# Patient Record
Sex: MALE | Race: WHITE | NOT HISPANIC OR LATINO | Employment: OTHER | RURAL
[De-identification: names, ages, dates, MRNs, and addresses within clinical notes are randomized per-mention and may not be internally consistent; named-entity substitution may affect disease eponyms.]

---

## 2020-03-23 ENCOUNTER — HISTORICAL (OUTPATIENT)
Dept: ADMINISTRATIVE | Facility: HOSPITAL | Age: 85
End: 2020-03-23

## 2020-03-23 LAB
ALBUMIN SERPL BCP-MCNC: 3.6 G/DL (ref 3.5–5)
ALBUMIN/GLOB SERPL: 0.9 {RATIO}
ALP SERPL-CCNC: 97 U/L (ref 45–115)
ALT SERPL W P-5'-P-CCNC: 15 U/L (ref 16–61)
APTT PPP: 25.1 SECONDS (ref 25.2–37.3)
AST SERPL W P-5'-P-CCNC: 16 U/L (ref 15–37)
BASOPHILS # BLD AUTO: 0.04 X10E3/UL (ref 0–0.2)
BASOPHILS NFR BLD AUTO: 0.4 % (ref 0–1)
BILIRUB SERPL-MCNC: 1 MG/DL (ref 0–1.2)
BUN SERPL-MCNC: 16 MG/DL (ref 7–18)
BUN/CREAT SERPL: 12.3
CALCIUM SERPL-MCNC: 9.5 MG/DL (ref 8.5–10.1)
CHLORIDE SERPL-SCNC: 102 MMOL/L (ref 98–107)
CO2 SERPL-SCNC: 28 MMOL/L (ref 21–32)
CREAT SERPL-MCNC: 1.3 MG/DL (ref 0.7–1.3)
EOSINOPHIL # BLD AUTO: 0.07 X10E3/UL (ref 0–0.5)
EOSINOPHIL NFR BLD AUTO: 0.7 % (ref 1–4)
ERYTHROCYTE [DISTWIDTH] IN BLOOD BY AUTOMATED COUNT: 13.8 % (ref 11.5–14.5)
GLOBULIN SER-MCNC: 4 G/DL (ref 2–4)
GLUCOSE SERPL-MCNC: 150 MG/DL (ref 74–106)
HCT VFR BLD AUTO: 44.2 % (ref 40–54)
HGB BLD-MCNC: 13.4 G/DL (ref 13.5–18)
IMM GRANULOCYTES # BLD AUTO: 0.08 X10E3/UL (ref 0–0.04)
IMM GRANULOCYTES NFR BLD: 0.8 % (ref 0–0.4)
INR BLD: 1.15 (ref 0–3.3)
LYMPHOCYTES # BLD AUTO: 0.73 X10E3/UL (ref 1–4.8)
LYMPHOCYTES NFR BLD AUTO: 7.3 % (ref 27–41)
MCH RBC QN AUTO: 28.5 PG (ref 27–31)
MCHC RBC AUTO-ENTMCNC: 30.3 G/DL (ref 32–36)
MCV RBC AUTO: 93.8 FL (ref 80–96)
MONOCYTES # BLD AUTO: 0.61 X10E3/UL (ref 0–0.8)
MONOCYTES NFR BLD AUTO: 6.1 % (ref 2–6)
MPC BLD CALC-MCNC: 10.5 FL (ref 9.4–12.4)
NEUTROPHILS # BLD AUTO: 8.49 X10E3/UL (ref 1.8–7.7)
NEUTROPHILS NFR BLD AUTO: 84.7 % (ref 53–65)
NRBC # BLD AUTO: 0 X10E3/UL (ref 0–0)
NRBC, AUTO (.00): 0 /100 (ref 0–0)
PLATELET # BLD AUTO: 240 X10E3/UL (ref 150–400)
POTASSIUM SERPL-SCNC: 4.4 MMOL/L (ref 3.5–5.1)
PROT SERPL-MCNC: 7.6 G/DL (ref 6.4–8.2)
PROTHROMBIN TIME: 14.8 SECONDS (ref 11.7–14.7)
RBC # BLD AUTO: 4.71 X10E6/UL (ref 4.6–6.2)
SODIUM SERPL-SCNC: 136 MMOL/L (ref 136–145)
WBC # BLD AUTO: 10.02 X10E3/UL (ref 4.5–11)

## 2020-03-24 ENCOUNTER — HISTORICAL (OUTPATIENT)
Dept: ADMINISTRATIVE | Facility: HOSPITAL | Age: 85
End: 2020-03-24

## 2020-03-24 LAB
ABO: NORMAL
ANTIBODY SCREEN: NORMAL
BILIRUB UR QL STRIP: ABNORMAL MG/DL
CLARITY UR: CLEAR
COLOR UR: YELLOW
GLUCOSE UR STRIP-MCNC: NEGATIVE MG/DL
KETONES UR STRIP-SCNC: NEGATIVE MG/DL
LEUKOCYTE ESTERASE UR QL STRIP: NEGATIVE LEU/UL
NITRITE UR QL STRIP: NEGATIVE
PH UR STRIP: 5.5 PH UNITS (ref 5–8)
PROT UR QL STRIP: NEGATIVE MG/DL
RBC # UR STRIP: NEGATIVE ERY/UL
RH TYPE: NORMAL
SP GR UR STRIP: >=1.03 (ref 1–1.03)
UROBILINOGEN UR STRIP-ACNC: 0.2 EU/DL

## 2020-03-25 ENCOUNTER — HISTORICAL (OUTPATIENT)
Dept: ADMINISTRATIVE | Facility: HOSPITAL | Age: 85
End: 2020-03-25

## 2020-03-25 LAB
BASOPHILS # BLD AUTO: 0.04 X10E3/UL (ref 0–0.2)
BASOPHILS NFR BLD AUTO: 0.4 % (ref 0–1)
BILIRUB UR QL STRIP: NEGATIVE MG/DL
BUN SERPL-MCNC: 28 MG/DL (ref 7–18)
CALCIUM SERPL-MCNC: 9.3 MG/DL (ref 8.5–10.1)
CHLORIDE SERPL-SCNC: 101 MMOL/L (ref 98–107)
CLARITY UR: CLEAR
CO2 SERPL-SCNC: 24 MMOL/L (ref 21–32)
COLOR UR: ABNORMAL
CREAT SERPL-MCNC: 1.7 MG/DL (ref 0.7–1.3)
EOSINOPHIL # BLD AUTO: 0.08 X10E3/UL (ref 0–0.5)
EOSINOPHIL NFR BLD AUTO: 0.8 % (ref 1–4)
ERYTHROCYTE [DISTWIDTH] IN BLOOD BY AUTOMATED COUNT: 13.8 % (ref 11.5–14.5)
FLUAV AG UPPER RESP QL IA.RAPID: NEGATIVE
FLUBV AG UPPER RESP QL IA.RAPID: NEGATIVE
GLUCOSE SERPL-MCNC: 109 MG/DL (ref 74–106)
GLUCOSE UR STRIP-MCNC: NEGATIVE MG/DL
HCT VFR BLD AUTO: 39.8 % (ref 40–54)
HGB BLD-MCNC: 12.3 G/DL (ref 13.5–18)
IMM GRANULOCYTES # BLD AUTO: 0.05 X10E3/UL (ref 0–0.04)
IMM GRANULOCYTES NFR BLD: 0.5 % (ref 0–0.4)
KETONES UR STRIP-SCNC: NEGATIVE MG/DL
LEUKOCYTE ESTERASE UR QL STRIP: NEGATIVE LEU/UL
LYMPHOCYTES # BLD AUTO: 1.35 X10E3/UL (ref 1–4.8)
LYMPHOCYTES NFR BLD AUTO: 12.7 % (ref 27–41)
MAGNESIUM SERPL-MCNC: 2.1 MG/DL (ref 1.7–2.3)
MCH RBC QN AUTO: 28.8 PG (ref 27–31)
MCHC RBC AUTO-ENTMCNC: 30.9 G/DL (ref 32–36)
MCV RBC AUTO: 93.2 FL (ref 80–96)
MONOCYTES # BLD AUTO: 1.03 X10E3/UL (ref 0–0.8)
MONOCYTES NFR BLD AUTO: 9.7 % (ref 2–6)
MPC BLD CALC-MCNC: 10.5 FL (ref 9.4–12.4)
NEUTROPHILS # BLD AUTO: 8.05 X10E3/UL (ref 1.8–7.7)
NEUTROPHILS NFR BLD AUTO: 75.9 % (ref 53–65)
NITRITE UR QL STRIP: NEGATIVE
NRBC # BLD AUTO: 0 X10E3/UL (ref 0–0)
NRBC, AUTO (.00): 0 /100 (ref 0–0)
PH UR STRIP: 5 PH UNITS (ref 5–8)
PLATELET # BLD AUTO: 184 X10E3/UL (ref 150–400)
POTASSIUM SERPL-SCNC: 4.5 MMOL/L (ref 3.5–5.1)
PROT UR QL STRIP: NEGATIVE MG/DL
RAPID GROUP A STREP: NEGATIVE
RAPID RSV: NEGATIVE
RBC # BLD AUTO: 4.27 X10E6/UL (ref 4.6–6.2)
RBC # UR STRIP: ABNORMAL ERY/UL
SODIUM SERPL-SCNC: 134 MMOL/L (ref 136–145)
SP GR UR STRIP: 1.01 (ref 1–1.03)
UROBILINOGEN UR STRIP-ACNC: 0.2 EU/DL
WBC # BLD AUTO: 10.6 X10E3/UL (ref 4.5–11)

## 2020-03-26 ENCOUNTER — HISTORICAL (OUTPATIENT)
Dept: ADMINISTRATIVE | Facility: HOSPITAL | Age: 85
End: 2020-03-26

## 2020-03-26 LAB
BASOPHILS # BLD AUTO: 0.03 X10E3/UL (ref 0–0.2)
BASOPHILS NFR BLD AUTO: 0.2 % (ref 0–1)
BUN SERPL-MCNC: 23 MG/DL (ref 7–18)
CALCIUM SERPL-MCNC: 9 MG/DL (ref 8.5–10.1)
CHLORIDE SERPL-SCNC: 101 MMOL/L (ref 98–107)
CO2 SERPL-SCNC: 29 MMOL/L (ref 21–32)
CREAT SERPL-MCNC: 1.48 MG/DL (ref 0.7–1.3)
EOSINOPHIL # BLD AUTO: 0 X10E3/UL (ref 0–0.5)
EOSINOPHIL NFR BLD AUTO: 0 % (ref 1–4)
ERYTHROCYTE [DISTWIDTH] IN BLOOD BY AUTOMATED COUNT: 14.1 % (ref 11.5–14.5)
GLUCOSE SERPL-MCNC: 136 MG/DL (ref 74–106)
HCT VFR BLD AUTO: 37.7 % (ref 40–54)
HGB BLD-MCNC: 11.8 G/DL (ref 13.5–18)
IMM GRANULOCYTES # BLD AUTO: 0.07 X10E3/UL (ref 0–0.04)
IMM GRANULOCYTES NFR BLD: 0.5 % (ref 0–0.4)
LYMPHOCYTES # BLD AUTO: 0.92 X10E3/UL (ref 1–4.8)
LYMPHOCYTES NFR BLD AUTO: 7 % (ref 27–41)
MCH RBC QN AUTO: 28.7 PG (ref 27–31)
MCHC RBC AUTO-ENTMCNC: 31.3 G/DL (ref 32–36)
MCV RBC AUTO: 91.7 FL (ref 80–96)
MONOCYTES # BLD AUTO: 1.01 X10E3/UL (ref 0–0.8)
MONOCYTES NFR BLD AUTO: 7.7 % (ref 2–6)
MPC BLD CALC-MCNC: 9.8 FL (ref 9.4–12.4)
NEUTROPHILS # BLD AUTO: 11.02 X10E3/UL (ref 1.8–7.7)
NEUTROPHILS NFR BLD AUTO: 84.6 % (ref 53–65)
NRBC # BLD AUTO: 0 X10E3/UL (ref 0–0)
NRBC, AUTO (.00): 0 /100 (ref 0–0)
PLATELET # BLD AUTO: 191 X10E3/UL (ref 150–400)
POTASSIUM SERPL-SCNC: 4.9 MMOL/L (ref 3.5–5.1)
RBC # BLD AUTO: 4.11 X10E6/UL (ref 4.6–6.2)
SODIUM SERPL-SCNC: 135 MMOL/L (ref 136–145)
WBC # BLD AUTO: 13.05 X10E3/UL (ref 4.5–11)

## 2020-03-27 LAB
REPORT: NORMAL

## 2020-03-30 ENCOUNTER — HISTORICAL (OUTPATIENT)
Dept: ADMINISTRATIVE | Facility: HOSPITAL | Age: 85
End: 2020-03-30

## 2020-03-31 LAB
REPORT: NORMAL

## 2020-04-01 LAB
REPORT: NORMAL

## 2020-04-05 ENCOUNTER — HISTORICAL (OUTPATIENT)
Dept: ADMINISTRATIVE | Facility: HOSPITAL | Age: 85
End: 2020-04-05

## 2020-04-09 ENCOUNTER — HISTORICAL (OUTPATIENT)
Dept: ADMINISTRATIVE | Facility: HOSPITAL | Age: 85
End: 2020-04-09

## 2020-04-09 LAB
ALBUMIN SERPL BCP-MCNC: 2.9 G/DL (ref 3.5–5)
ALBUMIN/GLOB SERPL: 0.6 {RATIO}
ALP SERPL-CCNC: 127 U/L (ref 45–115)
ALT SERPL W P-5'-P-CCNC: 21 U/L (ref 16–61)
AST SERPL W P-5'-P-CCNC: 20 U/L (ref 15–37)
BASOPHILS # BLD AUTO: 0.05 X10E3/UL (ref 0–0.2)
BASOPHILS NFR BLD AUTO: 0.6 % (ref 0–1)
BILIRUB SERPL-MCNC: 0.6 MG/DL (ref 0–1.2)
BUN SERPL-MCNC: 25 MG/DL (ref 7–18)
BUN/CREAT SERPL: 20.5
CALCIUM SERPL-MCNC: 9.5 MG/DL (ref 8.5–10.1)
CHLORIDE SERPL-SCNC: 101 MMOL/L (ref 98–107)
CO2 SERPL-SCNC: 33 MMOL/L (ref 21–32)
CREAT SERPL-MCNC: 1.22 MG/DL (ref 0.7–1.3)
EOSINOPHIL # BLD AUTO: 0.37 X10E3/UL (ref 0–0.5)
EOSINOPHIL NFR BLD AUTO: 4.3 % (ref 1–4)
ERYTHROCYTE [DISTWIDTH] IN BLOOD BY AUTOMATED COUNT: 14.6 % (ref 11.5–14.5)
GLOBULIN SER-MCNC: 4.6 G/DL (ref 2–4)
GLUCOSE SERPL-MCNC: 111 MG/DL (ref 74–106)
HCT VFR BLD AUTO: 39.7 % (ref 40–54)
HGB BLD-MCNC: 12.1 G/DL (ref 13.5–18)
IMM GRANULOCYTES # BLD AUTO: 0.06 X10E3/UL (ref 0–0.04)
IMM GRANULOCYTES NFR BLD: 0.7 % (ref 0–0.4)
LYMPHOCYTES # BLD AUTO: 1.52 X10E3/UL (ref 1–4.8)
LYMPHOCYTES NFR BLD AUTO: 17.7 % (ref 27–41)
MCH RBC QN AUTO: 29 PG (ref 27–31)
MCHC RBC AUTO-ENTMCNC: 30.5 G/DL (ref 32–36)
MCV RBC AUTO: 95.2 FL (ref 80–96)
MONOCYTES # BLD AUTO: 0.75 X10E3/UL (ref 0–0.8)
MONOCYTES NFR BLD AUTO: 8.7 % (ref 2–6)
MPC BLD CALC-MCNC: 9.5 FL (ref 9.4–12.4)
NEUTROPHILS # BLD AUTO: 5.86 X10E3/UL (ref 1.8–7.7)
NEUTROPHILS NFR BLD AUTO: 68 % (ref 53–65)
NRBC # BLD AUTO: 0 X10E3/UL (ref 0–0)
NRBC, AUTO (.00): 0 /100 (ref 0–0)
PLATELET # BLD AUTO: 421 X10E3/UL (ref 150–400)
POTASSIUM SERPL-SCNC: 4.7 MMOL/L (ref 3.5–5.1)
PROT SERPL-MCNC: 7.5 G/DL (ref 6.4–8.2)
RBC # BLD AUTO: 4.17 X10E6/UL (ref 4.6–6.2)
SODIUM SERPL-SCNC: 138 MMOL/L (ref 136–145)
WBC # BLD AUTO: 8.61 X10E3/UL (ref 4.5–11)

## 2020-04-12 ENCOUNTER — HISTORICAL (OUTPATIENT)
Dept: ADMINISTRATIVE | Facility: HOSPITAL | Age: 85
End: 2020-04-12

## 2020-04-12 LAB
BASOPHILS # BLD AUTO: 0.08 X10E3/UL (ref 0–0.2)
BASOPHILS NFR BLD AUTO: 1 % (ref 0–1)
BUN SERPL-MCNC: 40 MG/DL (ref 7–18)
CALCIUM SERPL-MCNC: 9.3 MG/DL (ref 8.5–10.1)
CHLORIDE SERPL-SCNC: 102 MMOL/L (ref 98–107)
CO2 SERPL-SCNC: 26 MMOL/L (ref 21–32)
CREAT SERPL-MCNC: 1.47 MG/DL (ref 0.7–1.3)
EOSINOPHIL # BLD AUTO: 0.32 X10E3/UL (ref 0–0.5)
EOSINOPHIL NFR BLD AUTO: 3.9 % (ref 1–4)
ERYTHROCYTE [DISTWIDTH] IN BLOOD BY AUTOMATED COUNT: 15.1 % (ref 11.5–14.5)
GLUCOSE SERPL-MCNC: 112 MG/DL (ref 74–106)
HCT VFR BLD AUTO: 40.9 % (ref 40–54)
HGB BLD-MCNC: 12.8 G/DL (ref 13.5–18)
IMM GRANULOCYTES # BLD AUTO: 0.05 X10E3/UL (ref 0–0.04)
IMM GRANULOCYTES NFR BLD: 0.6 % (ref 0–0.4)
LYMPHOCYTES # BLD AUTO: 1.57 X10E3/UL (ref 1–4.8)
LYMPHOCYTES NFR BLD AUTO: 19 % (ref 27–41)
MCH RBC QN AUTO: 29.4 PG (ref 27–31)
MCHC RBC AUTO-ENTMCNC: 31.3 G/DL (ref 32–36)
MCV RBC AUTO: 93.8 FL (ref 80–96)
MONOCYTES # BLD AUTO: 0.61 X10E3/UL (ref 0–0.8)
MONOCYTES NFR BLD AUTO: 7.4 % (ref 2–6)
MPC BLD CALC-MCNC: 9.4 FL (ref 9.4–12.4)
NEUTROPHILS # BLD AUTO: 5.65 X10E3/UL (ref 1.8–7.7)
NEUTROPHILS NFR BLD AUTO: 68.1 % (ref 53–65)
NRBC # BLD AUTO: 0 X10E3/UL (ref 0–0)
NRBC, AUTO (.00): 0 /100 (ref 0–0)
PLATELET # BLD AUTO: 409 X10E3/UL (ref 150–400)
POTASSIUM SERPL-SCNC: 4.3 MMOL/L (ref 3.5–5.1)
RBC # BLD AUTO: 4.36 X10E6/UL (ref 4.6–6.2)
SODIUM SERPL-SCNC: 138 MMOL/L (ref 136–145)
WBC # BLD AUTO: 8.28 X10E3/UL (ref 4.5–11)

## 2021-04-06 RX ORDER — HYDROCODONE BITARTRATE AND ACETAMINOPHEN 10; 325 MG/1; MG/1
1 TABLET ORAL 3 TIMES DAILY PRN
COMMUNITY
End: 2021-04-06 | Stop reason: SDUPTHER

## 2021-04-06 RX ORDER — TRAZODONE HYDROCHLORIDE 50 MG/1
50 TABLET ORAL NIGHTLY
COMMUNITY
End: 2021-04-06 | Stop reason: SDUPTHER

## 2021-04-06 RX ORDER — TRAMADOL HYDROCHLORIDE 50 MG/1
50 TABLET ORAL
COMMUNITY
End: 2021-04-06 | Stop reason: SDUPTHER

## 2021-04-06 RX ORDER — TRAMADOL HYDROCHLORIDE 50 MG/1
50 TABLET ORAL
Qty: 15 TABLET | Refills: 0 | Status: SHIPPED | OUTPATIENT
Start: 2021-04-06 | End: 2021-05-06 | Stop reason: SDUPTHER

## 2021-04-06 RX ORDER — TRAZODONE HYDROCHLORIDE 50 MG/1
50 TABLET ORAL NIGHTLY
Qty: 30 TABLET | Refills: 0 | Status: SHIPPED | OUTPATIENT
Start: 2021-04-06 | End: 2021-05-06 | Stop reason: SDUPTHER

## 2021-04-06 RX ORDER — HYDROCODONE BITARTRATE AND ACETAMINOPHEN 10; 325 MG/1; MG/1
1 TABLET ORAL 3 TIMES DAILY PRN
Qty: 75 TABLET | Refills: 0 | Status: SHIPPED | OUTPATIENT
Start: 2021-04-06 | End: 2021-05-06 | Stop reason: SDUPTHER

## 2021-04-20 ENCOUNTER — TELEPHONE (OUTPATIENT)
Dept: FAMILY MEDICINE | Facility: CLINIC | Age: 86
End: 2021-04-20

## 2021-05-06 ENCOUNTER — OFFICE VISIT (OUTPATIENT)
Dept: FAMILY MEDICINE | Facility: CLINIC | Age: 86
End: 2021-05-06
Payer: MEDICARE

## 2021-05-06 VITALS
OXYGEN SATURATION: 94 % | WEIGHT: 66 LBS | BODY MASS INDEX: 8.94 KG/M2 | RESPIRATION RATE: 16 BRPM | SYSTOLIC BLOOD PRESSURE: 146 MMHG | TEMPERATURE: 98 F | DIASTOLIC BLOOD PRESSURE: 91 MMHG | HEART RATE: 79 BPM | HEIGHT: 72 IN

## 2021-05-06 DIAGNOSIS — Z79.891 ENCOUNTER FOR LONG-TERM OPIATE ANALGESIC USE: ICD-10-CM

## 2021-05-06 DIAGNOSIS — D64.9 ANEMIA, UNSPECIFIED TYPE: ICD-10-CM

## 2021-05-06 DIAGNOSIS — G89.4 CHRONIC PAIN SYNDROME: Primary | ICD-10-CM

## 2021-05-06 LAB
ALBUMIN SERPL BCP-MCNC: 4 G/DL (ref 3.5–5)
ALBUMIN/GLOB SERPL: 1.1 {RATIO}
ALP SERPL-CCNC: 94 U/L (ref 45–115)
ALT SERPL W P-5'-P-CCNC: 17 U/L (ref 16–61)
ANION GAP SERPL CALCULATED.3IONS-SCNC: 10 MMOL/L (ref 7–16)
AST SERPL W P-5'-P-CCNC: 7 U/L (ref 15–37)
BASOPHILS # BLD AUTO: 0.05 K/UL (ref 0–0.2)
BASOPHILS NFR BLD AUTO: 0.8 % (ref 0–1)
BILIRUB SERPL-MCNC: 0.8 MG/DL (ref 0–1.2)
BILIRUB UR QL STRIP: NEGATIVE
BUN SERPL-MCNC: 14 MG/DL (ref 7–18)
BUN/CREAT SERPL: 15 (ref 6–20)
CALCIUM SERPL-MCNC: 9.2 MG/DL (ref 8.5–10.1)
CHLORIDE SERPL-SCNC: 106 MMOL/L (ref 98–107)
CLARITY UR: CLEAR
CO2 SERPL-SCNC: 29 MMOL/L (ref 21–32)
COLOR UR: YELLOW
CREAT SERPL-MCNC: 0.91 MG/DL (ref 0.7–1.3)
CTP QC/QA: YES
DIFFERENTIAL METHOD BLD: ABNORMAL
EOSINOPHIL # BLD AUTO: 0.24 K/UL (ref 0–0.5)
EOSINOPHIL NFR BLD AUTO: 3.9 % (ref 1–4)
ERYTHROCYTE [DISTWIDTH] IN BLOOD BY AUTOMATED COUNT: 16.3 % (ref 11.5–14.5)
GLOBULIN SER-MCNC: 3.8 G/DL (ref 2–4)
GLUCOSE SERPL-MCNC: 100 MG/DL (ref 74–106)
GLUCOSE UR STRIP-MCNC: NEGATIVE MG/DL
HCT VFR BLD AUTO: 44.7 % (ref 40–54)
HGB BLD-MCNC: 13.4 G/DL (ref 13.5–18)
IMM GRANULOCYTES # BLD AUTO: 0.01 K/UL (ref 0–0.04)
IMM GRANULOCYTES NFR BLD: 0.2 % (ref 0–0.4)
KETONES UR STRIP-SCNC: NEGATIVE MG/DL
LEUKOCYTE ESTERASE UR QL STRIP: NEGATIVE
LYMPHOCYTES # BLD AUTO: 1.68 K/UL (ref 1–4.8)
LYMPHOCYTES NFR BLD AUTO: 27 % (ref 27–41)
MCH RBC QN AUTO: 28 PG (ref 27–31)
MCHC RBC AUTO-ENTMCNC: 30 G/DL (ref 32–36)
MCV RBC AUTO: 93.5 FL (ref 80–96)
MONOCYTES # BLD AUTO: 0.58 K/UL (ref 0–0.8)
MONOCYTES NFR BLD AUTO: 9.3 % (ref 2–6)
MPC BLD CALC-MCNC: 11 FL (ref 9.4–12.4)
NEUTROPHILS # BLD AUTO: 3.67 K/UL (ref 1.8–7.7)
NEUTROPHILS NFR BLD AUTO: 58.8 % (ref 53–65)
NITRITE UR QL STRIP: NEGATIVE
NRBC # BLD AUTO: 0 X10E3/UL
NRBC, AUTO (.00): 0 %
PH UR STRIP: 5.5 PH UNITS
PLATELET # BLD AUTO: 219 K/UL (ref 150–400)
POC (AMP) AMPHETAMINE: NEGATIVE
POC (BAR) BARBITURATES: NEGATIVE
POC (BUP) BUPRENORPHINE: NEGATIVE
POC (BZO) BENZODIAZEPINES: NEGATIVE
POC (COC) COCAINE: NEGATIVE
POC (MDMA) METHYLENEDIOXYMETHAMPHETAMINE 3,4: NEGATIVE
POC (MET) METHAMPHETAMINE: NEGATIVE
POC (MOP) OPIATES: ABNORMAL
POC (MTD) METHADONE: NEGATIVE
POC (OXY) OXYCODONE: NEGATIVE
POC (PCP) PHENCYCLIDINE: NEGATIVE
POC (TCA) TRICYCLIC ANTIDEPRESSANTS: NEGATIVE
POC TEMPERATURE (URINE): 92
POC THC: NEGATIVE
POTASSIUM SERPL-SCNC: 4.7 MMOL/L (ref 3.5–5.1)
PROT SERPL-MCNC: 7.8 G/DL (ref 6.4–8.2)
PROT UR QL STRIP: NEGATIVE
RBC # BLD AUTO: 4.78 M/UL (ref 4.6–6.2)
RBC # UR STRIP: ABNORMAL /UL
SODIUM SERPL-SCNC: 140 MMOL/L (ref 136–145)
SP GR UR STRIP: 1.02
UROBILINOGEN UR STRIP-ACNC: 0.2 MG/DL
WBC # BLD AUTO: 6.23 K/UL (ref 4.5–11)

## 2021-05-06 PROCEDURE — 1159F MED LIST DOCD IN RCRD: CPT | Mod: ,,, | Performed by: FAMILY MEDICINE

## 2021-05-06 PROCEDURE — 85025 COMPLETE CBC W/AUTO DIFF WBC: CPT | Mod: ,,, | Performed by: CLINICAL MEDICAL LABORATORY

## 2021-05-06 PROCEDURE — 80305 POCT URINE DRUG SCREEN PRESUMP: ICD-10-PCS | Mod: QW,,, | Performed by: FAMILY MEDICINE

## 2021-05-06 PROCEDURE — 80053 COMPREHEN METABOLIC PANEL: CPT | Mod: ,,, | Performed by: CLINICAL MEDICAL LABORATORY

## 2021-05-06 PROCEDURE — 81003 URINALYSIS: ICD-10-PCS | Mod: QW,59,, | Performed by: CLINICAL MEDICAL LABORATORY

## 2021-05-06 PROCEDURE — 85025 CBC WITH DIFFERENTIAL: ICD-10-PCS | Mod: ,,, | Performed by: CLINICAL MEDICAL LABORATORY

## 2021-05-06 PROCEDURE — 80305 DRUG TEST PRSMV DIR OPT OBS: CPT | Mod: QW,,, | Performed by: FAMILY MEDICINE

## 2021-05-06 PROCEDURE — 99214 OFFICE O/P EST MOD 30 MIN: CPT | Mod: ,,, | Performed by: FAMILY MEDICINE

## 2021-05-06 PROCEDURE — 99214 PR OFFICE/OUTPT VISIT, EST, LEVL IV, 30-39 MIN: ICD-10-PCS | Mod: ,,, | Performed by: FAMILY MEDICINE

## 2021-05-06 PROCEDURE — G0480 DRUG TEST DEF 1-7 CLASSES: HCPCS | Mod: ,,, | Performed by: CLINICAL MEDICAL LABORATORY

## 2021-05-06 PROCEDURE — 80053 COMPREHENSIVE METABOLIC PANEL: ICD-10-PCS | Mod: ,,, | Performed by: CLINICAL MEDICAL LABORATORY

## 2021-05-06 PROCEDURE — 1159F PR MEDICATION LIST DOCUMENTED IN MEDICAL RECORD: ICD-10-PCS | Mod: ,,, | Performed by: FAMILY MEDICINE

## 2021-05-06 PROCEDURE — 1126F AMNT PAIN NOTED NONE PRSNT: CPT | Mod: ,,, | Performed by: FAMILY MEDICINE

## 2021-05-06 PROCEDURE — G0480 DRUG SCREEN DEFINITIVE 7, URINE: ICD-10-PCS | Mod: ,,, | Performed by: CLINICAL MEDICAL LABORATORY

## 2021-05-06 PROCEDURE — 81003 URINALYSIS AUTO W/O SCOPE: CPT | Mod: QW,59,, | Performed by: CLINICAL MEDICAL LABORATORY

## 2021-05-06 PROCEDURE — 1126F PR PAIN SEVERITY QUANTIFIED, NO PAIN PRESENT: ICD-10-PCS | Mod: ,,, | Performed by: FAMILY MEDICINE

## 2021-05-06 RX ORDER — FUROSEMIDE 40 MG/1
40 TABLET ORAL 2 TIMES DAILY
COMMUNITY
End: 2021-06-22

## 2021-05-06 RX ORDER — TRAZODONE HYDROCHLORIDE 50 MG/1
50 TABLET ORAL NIGHTLY
Qty: 30 TABLET | Refills: 0 | Status: SHIPPED | OUTPATIENT
Start: 2021-05-06 | End: 2021-07-07 | Stop reason: SDUPTHER

## 2021-05-06 RX ORDER — CARVEDILOL 6.25 MG/1
6.25 TABLET ORAL DAILY
COMMUNITY
End: 2022-03-15 | Stop reason: SDUPTHER

## 2021-05-06 RX ORDER — CHOLECALCIFEROL (VITAMIN D3) 1250 MCG
1 TABLET ORAL
Qty: 12 TABLET | Refills: 0 | Status: SHIPPED | OUTPATIENT
Start: 2021-05-06 | End: 2021-08-04

## 2021-05-06 RX ORDER — TRAMADOL HYDROCHLORIDE 50 MG/1
50 TABLET ORAL
Qty: 15 TABLET | Refills: 0 | Status: SHIPPED | OUTPATIENT
Start: 2021-05-06 | End: 2021-06-08 | Stop reason: SDUPTHER

## 2021-05-06 RX ORDER — HYDROCODONE BITARTRATE AND ACETAMINOPHEN 10; 325 MG/1; MG/1
1 TABLET ORAL 3 TIMES DAILY PRN
Qty: 75 TABLET | Refills: 0 | Status: SHIPPED | OUTPATIENT
Start: 2021-05-06 | End: 2021-06-08 | Stop reason: SDUPTHER

## 2021-05-06 RX ORDER — ACETAMINOPHEN 325 MG/1
325 TABLET ORAL EVERY 6 HOURS PRN
COMMUNITY
End: 2023-10-06 | Stop reason: CLARIF

## 2021-05-06 RX ORDER — FERROUS SULFATE 325(65) MG
325 TABLET, DELAYED RELEASE (ENTERIC COATED) ORAL DAILY
Qty: 30 TABLET | Refills: 1 | Status: SHIPPED | OUTPATIENT
Start: 2021-05-06 | End: 2021-08-03 | Stop reason: SDUPTHER

## 2021-05-06 RX ORDER — ATORVASTATIN CALCIUM 10 MG/1
10 TABLET, FILM COATED ORAL DAILY
COMMUNITY
End: 2021-07-07

## 2021-05-06 RX ORDER — ASPIRIN 81 MG/1
81 TABLET ORAL DAILY
COMMUNITY
End: 2021-08-03 | Stop reason: SDUPTHER

## 2021-05-06 RX ORDER — LOSARTAN POTASSIUM 50 MG/1
50 TABLET ORAL DAILY
COMMUNITY
End: 2021-06-22 | Stop reason: SDUPTHER

## 2021-05-06 RX ORDER — HYDROCHLOROTHIAZIDE 12.5 MG/1
12.5 TABLET ORAL DAILY
COMMUNITY
End: 2021-06-22

## 2021-05-14 LAB
6-ACETYLMORPHINE, URINE (RUSH): NEGATIVE 10 NG/ML
7-AMINOCLONAZEPAM, URINE (RUSH): NEGATIVE 25 NG/ML
A-HYDROXYALPRAZOLAM, URINE (RUSH): NEGATIVE 25 NG/ML
AMPHET UR QL SCN: NEGATIVE 100 NG/ML
BENZOYLECGONINE, URINE (RUSH): NEGATIVE 100 NG/ML
BUTALBITAL, URINE (RUSH): NEGATIVE 50 NG/ML
CODEINE, URINE (RUSH): NEGATIVE 25 NG/ML
CREAT UR-MCNC: 100 MG/DL (ref 39–259)
HYDROCODONE, URINE (RUSH): >250 25 NG/ML
HYDROMORPHONE, URINE (RUSH): >250 25 NG/ML
LORAZEPAM, URINE (RUSH): NEGATIVE 25 NG/ML
METHAMPHET UR QL SCN: NEGATIVE 100 NG/ML
MORPHINE, URINE (RUSH): NEGATIVE 25 NG/ML
NORDIAZEPAM, URINE (RUSH): NEGATIVE 25 NG/ML
NORHYDROCODONE, URINE (RUSH): >500 50 NG/ML
NOROXYCODONE HCL, URINE (RUSH): NEGATIVE 50 NG/ML
OXAZEPAM, URINE (RUSH): NEGATIVE 25 NG/ML
OXYCODONE UR QL SCN: NEGATIVE 25 NG/ML
OXYMORPHONE, URINE (RUSH): NEGATIVE 25 NG/ML
PH UR STRIP: 5.5 PH UNITS
PHENOBARBITAL, URINE (RUSH): NEGATIVE 50 NG/ML
SECOBARBITAL, URINE (RUSH): NEGATIVE 50 NG/ML
SP GR UR STRIP: 1.02
TEMAZEPAM, URINE (RUSH): NEGATIVE 25 NG/ML

## 2021-06-06 ENCOUNTER — PATIENT MESSAGE (OUTPATIENT)
Dept: FAMILY MEDICINE | Facility: CLINIC | Age: 86
End: 2021-06-06

## 2021-06-08 DIAGNOSIS — Z79.891 LONG-TERM CURRENT USE OF OPIATE ANALGESIC: Primary | ICD-10-CM

## 2021-06-08 RX ORDER — HYDROCODONE BITARTRATE AND ACETAMINOPHEN 10; 325 MG/1; MG/1
1 TABLET ORAL 3 TIMES DAILY PRN
Qty: 75 TABLET | Refills: 0 | Status: SHIPPED | OUTPATIENT
Start: 2021-06-08 | End: 2021-07-07 | Stop reason: SDUPTHER

## 2021-06-08 RX ORDER — TRAMADOL HYDROCHLORIDE 50 MG/1
50 TABLET ORAL
Qty: 15 TABLET | Refills: 0 | Status: SHIPPED | OUTPATIENT
Start: 2021-06-08 | End: 2021-09-07

## 2021-06-16 ENCOUNTER — OFFICE VISIT (OUTPATIENT)
Dept: FAMILY MEDICINE | Facility: CLINIC | Age: 86
End: 2021-06-16
Payer: MEDICARE

## 2021-06-16 VITALS
RESPIRATION RATE: 16 BRPM | TEMPERATURE: 98 F | SYSTOLIC BLOOD PRESSURE: 181 MMHG | HEIGHT: 72 IN | WEIGHT: 167 LBS | BODY MASS INDEX: 22.62 KG/M2 | DIASTOLIC BLOOD PRESSURE: 96 MMHG | OXYGEN SATURATION: 95 % | HEART RATE: 77 BPM

## 2021-06-16 DIAGNOSIS — I10 HYPERTENSION, UNSPECIFIED TYPE: Primary | ICD-10-CM

## 2021-06-16 DIAGNOSIS — D64.9 ANEMIA, UNSPECIFIED TYPE: ICD-10-CM

## 2021-06-16 DIAGNOSIS — E78.5 HYPERLIPIDEMIA, UNSPECIFIED HYPERLIPIDEMIA TYPE: ICD-10-CM

## 2021-06-16 LAB
25(OH)D3 SERPL-MCNC: 29.5 NG/ML
ALBUMIN SERPL BCP-MCNC: 3.9 G/DL (ref 3.5–5)
ALBUMIN/GLOB SERPL: 1.1 {RATIO}
ALP SERPL-CCNC: 98 U/L (ref 45–115)
ALT SERPL W P-5'-P-CCNC: 25 U/L (ref 16–61)
ANION GAP SERPL CALCULATED.3IONS-SCNC: 8 MMOL/L (ref 7–16)
AST SERPL W P-5'-P-CCNC: 9 U/L (ref 15–37)
BASOPHILS # BLD AUTO: 0.05 K/UL (ref 0–0.2)
BASOPHILS NFR BLD AUTO: 0.8 % (ref 0–1)
BILIRUB SERPL-MCNC: 0.5 MG/DL (ref 0–1.2)
BILIRUB UR QL STRIP: NEGATIVE
BUN SERPL-MCNC: 18 MG/DL (ref 7–18)
BUN/CREAT SERPL: 17 (ref 6–20)
CALCIUM SERPL-MCNC: 8.9 MG/DL (ref 8.5–10.1)
CHLORIDE SERPL-SCNC: 105 MMOL/L (ref 98–107)
CHOLEST SERPL-MCNC: 156 MG/DL (ref 0–200)
CHOLEST/HDLC SERPL: 2.7 {RATIO}
CLARITY UR: CLEAR
CO2 SERPL-SCNC: 32 MMOL/L (ref 21–32)
COLOR UR: YELLOW
CREAT SERPL-MCNC: 1.06 MG/DL (ref 0.7–1.3)
DIFFERENTIAL METHOD BLD: ABNORMAL
EOSINOPHIL # BLD AUTO: 0.27 K/UL (ref 0–0.5)
EOSINOPHIL NFR BLD AUTO: 4.4 % (ref 1–4)
ERYTHROCYTE [DISTWIDTH] IN BLOOD BY AUTOMATED COUNT: 16.1 % (ref 11.5–14.5)
FOLATE SERPL-MCNC: 8.2 NG/ML (ref 3.1–17.5)
GLOBULIN SER-MCNC: 3.5 G/DL (ref 2–4)
GLUCOSE SERPL-MCNC: 92 MG/DL (ref 74–106)
GLUCOSE UR STRIP-MCNC: NEGATIVE MG/DL
HCT VFR BLD AUTO: 45.7 % (ref 40–54)
HDLC SERPL-MCNC: 58 MG/DL (ref 40–60)
HGB BLD-MCNC: 13.5 G/DL (ref 13.5–18)
IMM GRANULOCYTES # BLD AUTO: 0.01 K/UL (ref 0–0.04)
IMM GRANULOCYTES NFR BLD: 0.2 % (ref 0–0.4)
KETONES UR STRIP-SCNC: NEGATIVE MG/DL
LDLC SERPL CALC-MCNC: 78 MG/DL
LDLC/HDLC SERPL: 1.3 {RATIO}
LEUKOCYTE ESTERASE UR QL STRIP: NEGATIVE
LYMPHOCYTES # BLD AUTO: 1.6 K/UL (ref 1–4.8)
LYMPHOCYTES NFR BLD AUTO: 26 % (ref 27–41)
MCH RBC QN AUTO: 28.5 PG (ref 27–31)
MCHC RBC AUTO-ENTMCNC: 29.5 G/DL (ref 32–36)
MCV RBC AUTO: 96.4 FL (ref 80–96)
MONOCYTES # BLD AUTO: 0.48 K/UL (ref 0–0.8)
MONOCYTES NFR BLD AUTO: 7.8 % (ref 2–6)
MPC BLD CALC-MCNC: 10.5 FL (ref 9.4–12.4)
NEUTROPHILS # BLD AUTO: 3.74 K/UL (ref 1.8–7.7)
NEUTROPHILS NFR BLD AUTO: 60.8 % (ref 53–65)
NITRITE UR QL STRIP: NEGATIVE
NONHDLC SERPL-MCNC: 98 MG/DL
NRBC # BLD AUTO: 0 X10E3/UL
NRBC, AUTO (.00): 0 %
PH UR STRIP: 6 PH UNITS
PLATELET # BLD AUTO: 229 K/UL (ref 150–400)
POTASSIUM SERPL-SCNC: 4.2 MMOL/L (ref 3.5–5.1)
PROT SERPL-MCNC: 7.4 G/DL (ref 6.4–8.2)
PROT UR QL STRIP: NEGATIVE
RBC # BLD AUTO: 4.74 M/UL (ref 4.6–6.2)
RBC # UR STRIP: NEGATIVE /UL
SODIUM SERPL-SCNC: 141 MMOL/L (ref 136–145)
SP GR UR STRIP: 1.02
T4 FREE SERPL-MCNC: 1.01 NG/DL (ref 0.76–1.46)
TRIGL SERPL-MCNC: 98 MG/DL (ref 35–150)
TSH SERPL DL<=0.005 MIU/L-ACNC: 1.48 UIU/ML (ref 0.36–3.74)
UROBILINOGEN UR STRIP-ACNC: 0.2 MG/DL
VIT B12 SERPL-MCNC: 255 PG/ML (ref 193–986)
VLDLC SERPL-MCNC: 20 MG/DL
WBC # BLD AUTO: 6.15 K/UL (ref 4.5–11)

## 2021-06-16 PROCEDURE — 80053 COMPREHENSIVE METABOLIC PANEL: ICD-10-PCS | Mod: QW,,, | Performed by: CLINICAL MEDICAL LABORATORY

## 2021-06-16 PROCEDURE — 82306 VITAMIN D 25 HYDROXY: CPT | Mod: GZ,,, | Performed by: CLINICAL MEDICAL LABORATORY

## 2021-06-16 PROCEDURE — 81003 URINALYSIS AUTO W/O SCOPE: CPT | Mod: QW,,, | Performed by: CLINICAL MEDICAL LABORATORY

## 2021-06-16 PROCEDURE — 1159F MED LIST DOCD IN RCRD: CPT | Mod: ,,, | Performed by: FAMILY MEDICINE

## 2021-06-16 PROCEDURE — 84439 ASSAY OF FREE THYROXINE: CPT | Mod: ,,, | Performed by: CLINICAL MEDICAL LABORATORY

## 2021-06-16 PROCEDURE — 82607 VITAMIN B-12: CPT | Mod: GZ,,, | Performed by: CLINICAL MEDICAL LABORATORY

## 2021-06-16 PROCEDURE — 99214 OFFICE O/P EST MOD 30 MIN: CPT | Mod: ,,, | Performed by: FAMILY MEDICINE

## 2021-06-16 PROCEDURE — 81003 URINALYSIS: ICD-10-PCS | Mod: QW,,, | Performed by: CLINICAL MEDICAL LABORATORY

## 2021-06-16 PROCEDURE — 84439 T4, FREE: ICD-10-PCS | Mod: ,,, | Performed by: CLINICAL MEDICAL LABORATORY

## 2021-06-16 PROCEDURE — 84443 TSH: ICD-10-PCS | Mod: QW,,, | Performed by: CLINICAL MEDICAL LABORATORY

## 2021-06-16 PROCEDURE — 85025 COMPLETE CBC W/AUTO DIFF WBC: CPT | Mod: QW,,, | Performed by: CLINICAL MEDICAL LABORATORY

## 2021-06-16 PROCEDURE — 1126F PR PAIN SEVERITY QUANTIFIED, NO PAIN PRESENT: ICD-10-PCS | Mod: ,,, | Performed by: FAMILY MEDICINE

## 2021-06-16 PROCEDURE — 85025 CBC WITH DIFFERENTIAL: ICD-10-PCS | Mod: QW,,, | Performed by: CLINICAL MEDICAL LABORATORY

## 2021-06-16 PROCEDURE — 80061 LIPID PANEL: ICD-10-PCS | Mod: QW,,, | Performed by: CLINICAL MEDICAL LABORATORY

## 2021-06-16 PROCEDURE — 82746 ASSAY OF FOLIC ACID SERUM: CPT | Mod: GZ,,, | Performed by: CLINICAL MEDICAL LABORATORY

## 2021-06-16 PROCEDURE — 84443 ASSAY THYROID STIM HORMONE: CPT | Mod: QW,,, | Performed by: CLINICAL MEDICAL LABORATORY

## 2021-06-16 PROCEDURE — 82306 VITAMIN D: ICD-10-PCS | Mod: GZ,,, | Performed by: CLINICAL MEDICAL LABORATORY

## 2021-06-16 PROCEDURE — 82607 VITAMIN B12/FOLATE, SERUM PANEL: ICD-10-PCS | Mod: GZ,,, | Performed by: CLINICAL MEDICAL LABORATORY

## 2021-06-16 PROCEDURE — 36415 PR COLLECTION VENOUS BLOOD,VENIPUNCTURE: ICD-10-PCS | Mod: ,,, | Performed by: CLINICAL MEDICAL LABORATORY

## 2021-06-16 PROCEDURE — 99214 PR OFFICE/OUTPT VISIT, EST, LEVL IV, 30-39 MIN: ICD-10-PCS | Mod: ,,, | Performed by: FAMILY MEDICINE

## 2021-06-16 PROCEDURE — 36415 COLL VENOUS BLD VENIPUNCTURE: CPT | Mod: ,,, | Performed by: CLINICAL MEDICAL LABORATORY

## 2021-06-16 PROCEDURE — 1159F PR MEDICATION LIST DOCUMENTED IN MEDICAL RECORD: ICD-10-PCS | Mod: ,,, | Performed by: FAMILY MEDICINE

## 2021-06-16 PROCEDURE — 80053 COMPREHEN METABOLIC PANEL: CPT | Mod: QW,,, | Performed by: CLINICAL MEDICAL LABORATORY

## 2021-06-16 PROCEDURE — 82746 VITAMIN B12/FOLATE, SERUM PANEL: ICD-10-PCS | Mod: GZ,,, | Performed by: CLINICAL MEDICAL LABORATORY

## 2021-06-16 PROCEDURE — 80061 LIPID PANEL: CPT | Mod: QW,,, | Performed by: CLINICAL MEDICAL LABORATORY

## 2021-06-16 PROCEDURE — 1126F AMNT PAIN NOTED NONE PRSNT: CPT | Mod: ,,, | Performed by: FAMILY MEDICINE

## 2021-06-16 RX ORDER — LOSARTAN POTASSIUM 25 MG/1
25 TABLET ORAL DAILY
COMMUNITY
End: 2021-06-22 | Stop reason: CLARIF

## 2021-06-22 RX ORDER — LOSARTAN POTASSIUM 50 MG/1
50 TABLET ORAL DAILY
Qty: 30 TABLET | Refills: 0 | Status: ON HOLD | OUTPATIENT
Start: 2021-06-22 | End: 2022-02-16 | Stop reason: SDUPTHER

## 2021-07-07 ENCOUNTER — HOSPITAL ENCOUNTER (OUTPATIENT)
Dept: RADIOLOGY | Facility: HOSPITAL | Age: 86
Discharge: HOME | End: 2021-07-07
Attending: FAMILY MEDICINE
Payer: MEDICARE

## 2021-07-07 ENCOUNTER — OFFICE VISIT (OUTPATIENT)
Dept: FAMILY MEDICINE | Facility: CLINIC | Age: 86
End: 2021-07-07
Payer: MEDICARE

## 2021-07-07 DIAGNOSIS — T40.2X5A CONSTIPATION DUE TO OPIOID THERAPY: ICD-10-CM

## 2021-07-07 DIAGNOSIS — R06.00 DYSPNEA, UNSPECIFIED TYPE: ICD-10-CM

## 2021-07-07 DIAGNOSIS — K59.03 CONSTIPATION DUE TO OPIOID THERAPY: ICD-10-CM

## 2021-07-07 DIAGNOSIS — I10 HYPERTENSION, UNSPECIFIED TYPE: ICD-10-CM

## 2021-07-07 DIAGNOSIS — Z79.891 LONG-TERM CURRENT USE OF OPIATE ANALGESIC: ICD-10-CM

## 2021-07-07 DIAGNOSIS — E78.5 HYPERLIPIDEMIA, UNSPECIFIED HYPERLIPIDEMIA TYPE: ICD-10-CM

## 2021-07-07 DIAGNOSIS — Z76.89 ENCOUNTER TO ESTABLISH CARE WITH NEW DOCTOR: Primary | ICD-10-CM

## 2021-07-07 PROCEDURE — 99214 PR OFFICE/OUTPT VISIT, EST, LEVL IV, 30-39 MIN: ICD-10-PCS | Mod: GC,,, | Performed by: FAMILY MEDICINE

## 2021-07-07 PROCEDURE — 99214 OFFICE O/P EST MOD 30 MIN: CPT | Mod: GC,,, | Performed by: FAMILY MEDICINE

## 2021-07-07 PROCEDURE — 71046 X-RAY EXAM CHEST 2 VIEWS: CPT | Mod: 26,,, | Performed by: RADIOLOGY

## 2021-07-07 PROCEDURE — 71046 X-RAY EXAM CHEST 2 VIEWS: CPT | Mod: TC

## 2021-07-07 PROCEDURE — 71046 XR CHEST PA AND LATERAL: ICD-10-PCS | Mod: 26,,, | Performed by: RADIOLOGY

## 2021-07-07 RX ORDER — SIMVASTATIN 80 MG/1
80 TABLET, FILM COATED ORAL NIGHTLY
COMMUNITY
End: 2022-02-21 | Stop reason: SDUPTHER

## 2021-07-07 RX ORDER — HYDROCODONE BITARTRATE AND ACETAMINOPHEN 10; 325 MG/1; MG/1
1 TABLET ORAL 3 TIMES DAILY PRN
Qty: 90 TABLET | Refills: 0 | Status: SHIPPED | OUTPATIENT
Start: 2021-07-07 | End: 2021-08-06

## 2021-07-07 RX ORDER — NIFEDIPINE 30 MG/1
30 TABLET, FILM COATED, EXTENDED RELEASE ORAL DAILY
COMMUNITY
End: 2022-02-28 | Stop reason: SDUPTHER

## 2021-07-07 RX ORDER — TRAZODONE HYDROCHLORIDE 50 MG/1
50 TABLET ORAL NIGHTLY
Qty: 30 TABLET | Refills: 0 | OUTPATIENT
Start: 2021-07-07 | End: 2022-02-21 | Stop reason: SDUPTHER

## 2021-07-08 VITALS
BODY MASS INDEX: 22.82 KG/M2 | DIASTOLIC BLOOD PRESSURE: 86 MMHG | SYSTOLIC BLOOD PRESSURE: 144 MMHG | OXYGEN SATURATION: 92 % | HEIGHT: 70 IN | WEIGHT: 159.38 LBS | HEART RATE: 78 BPM | TEMPERATURE: 98 F | RESPIRATION RATE: 18 BRPM

## 2021-08-03 ENCOUNTER — OFFICE VISIT (OUTPATIENT)
Dept: FAMILY MEDICINE | Facility: CLINIC | Age: 86
End: 2021-08-03
Payer: MEDICARE

## 2021-08-03 VITALS
TEMPERATURE: 98 F | BODY MASS INDEX: 22.84 KG/M2 | WEIGHT: 159.19 LBS | SYSTOLIC BLOOD PRESSURE: 165 MMHG | HEART RATE: 83 BPM | OXYGEN SATURATION: 90 % | DIASTOLIC BLOOD PRESSURE: 92 MMHG

## 2021-08-03 DIAGNOSIS — M54.50 CHRONIC LOW BACK PAIN, UNSPECIFIED BACK PAIN LATERALITY, UNSPECIFIED WHETHER SCIATICA PRESENT: ICD-10-CM

## 2021-08-03 DIAGNOSIS — K59.03 DRUG-INDUCED CONSTIPATION: ICD-10-CM

## 2021-08-03 DIAGNOSIS — K59.03 CONSTIPATION DUE TO OPIOID THERAPY: ICD-10-CM

## 2021-08-03 DIAGNOSIS — E61.1 IRON DEFICIENCY: ICD-10-CM

## 2021-08-03 DIAGNOSIS — E11.9 TYPE 2 DIABETES MELLITUS WITHOUT COMPLICATION, WITHOUT LONG-TERM CURRENT USE OF INSULIN: Primary | ICD-10-CM

## 2021-08-03 DIAGNOSIS — G89.29 CHRONIC LOW BACK PAIN, UNSPECIFIED BACK PAIN LATERALITY, UNSPECIFIED WHETHER SCIATICA PRESENT: ICD-10-CM

## 2021-08-03 DIAGNOSIS — I10 HYPERTENSION, UNSPECIFIED TYPE: ICD-10-CM

## 2021-08-03 DIAGNOSIS — T40.2X5A CONSTIPATION DUE TO OPIOID THERAPY: ICD-10-CM

## 2021-08-03 PROBLEM — M54.9 CHRONIC BACK PAIN: Status: ACTIVE | Noted: 2021-08-03

## 2021-08-03 PROBLEM — K59.00 CONSTIPATION: Status: ACTIVE | Noted: 2021-08-03

## 2021-08-03 LAB — GLUCOSE SERPL-MCNC: 109 MG/DL (ref 70–110)

## 2021-08-03 PROCEDURE — 99213 OFFICE O/P EST LOW 20 MIN: CPT | Mod: GC,,, | Performed by: FAMILY MEDICINE

## 2021-08-03 PROCEDURE — 82962 GLUCOSE BLOOD TEST: CPT | Mod: QW,GC,, | Performed by: FAMILY MEDICINE

## 2021-08-03 PROCEDURE — 82962 POCT GLUCOSE, HAND-HELD DEVICE: ICD-10-PCS | Mod: QW,GC,, | Performed by: FAMILY MEDICINE

## 2021-08-03 PROCEDURE — 99213 PR OFFICE/OUTPT VISIT, EST, LEVL III, 20-29 MIN: ICD-10-PCS | Mod: GC,,, | Performed by: FAMILY MEDICINE

## 2021-08-03 RX ORDER — ASPIRIN 81 MG/1
81 TABLET ORAL DAILY
Qty: 30 TABLET | Refills: 5 | OUTPATIENT
Start: 2021-08-03

## 2021-08-03 RX ORDER — ASPIRIN 81 MG/1
81 TABLET ORAL DAILY
Qty: 30 TABLET | Refills: 5 | OUTPATIENT
Start: 2021-08-03 | End: 2021-08-03

## 2021-08-03 RX ORDER — FERROUS SULFATE 325(65) MG
325 TABLET, DELAYED RELEASE (ENTERIC COATED) ORAL DAILY
Qty: 30 TABLET | Refills: 5 | OUTPATIENT
Start: 2021-08-03 | End: 2021-08-03 | Stop reason: CLARIF

## 2021-08-03 RX ORDER — FERROUS SULFATE 325(65) MG
325 TABLET, DELAYED RELEASE (ENTERIC COATED) ORAL DAILY
Qty: 30 TABLET | Refills: 5 | OUTPATIENT
Start: 2021-08-03 | End: 2021-08-03

## 2021-08-30 ENCOUNTER — HOSPITAL ENCOUNTER (OUTPATIENT)
Dept: RADIOLOGY | Facility: HOSPITAL | Age: 86
Discharge: HOME | End: 2021-08-30
Attending: PAIN MEDICINE
Payer: MEDICARE

## 2021-08-30 ENCOUNTER — OFFICE VISIT (OUTPATIENT)
Dept: PAIN MEDICINE | Facility: CLINIC | Age: 86
End: 2021-08-30
Payer: MEDICARE

## 2021-08-30 VITALS
SYSTOLIC BLOOD PRESSURE: 186 MMHG | HEART RATE: 88 BPM | RESPIRATION RATE: 22 BRPM | BODY MASS INDEX: 21.98 KG/M2 | WEIGHT: 157 LBS | DIASTOLIC BLOOD PRESSURE: 103 MMHG | HEIGHT: 71 IN

## 2021-08-30 DIAGNOSIS — Z79.899 ENCOUNTER FOR LONG-TERM (CURRENT) USE OF OTHER MEDICATIONS: Primary | ICD-10-CM

## 2021-08-30 DIAGNOSIS — M54.50 CHRONIC BILATERAL LOW BACK PAIN WITHOUT SCIATICA: ICD-10-CM

## 2021-08-30 DIAGNOSIS — M47.816 SPONDYLOSIS OF LUMBAR REGION WITHOUT MYELOPATHY OR RADICULOPATHY: ICD-10-CM

## 2021-08-30 DIAGNOSIS — G89.29 CHRONIC BILATERAL LOW BACK PAIN WITHOUT SCIATICA: ICD-10-CM

## 2021-08-30 LAB
CTP QC/QA: YES
POC (AMP) AMPHETAMINE: NEGATIVE
POC (BAR) BARBITURATES: NEGATIVE
POC (BUP) BUPRENORPHINE: NEGATIVE
POC (BZO) BENZODIAZEPINES: NEGATIVE
POC (COC) COCAINE: NEGATIVE
POC (MDMA) METHYLENEDIOXYMETHAMPHETAMINE 3,4: NEGATIVE
POC (MET) METHAMPHETAMINE: NEGATIVE
POC (MOP) OPIATES: ABNORMAL
POC (MTD) METHADONE: NEGATIVE
POC (OXY) OXYCODONE: NEGATIVE
POC (PCP) PHENCYCLIDINE: NEGATIVE
POC (TCA) TRICYCLIC ANTIDEPRESSANTS: NEGATIVE
POC TEMPERATURE (URINE): 94
POC THC: NEGATIVE

## 2021-08-30 PROCEDURE — 1125F AMNT PAIN NOTED PAIN PRSNT: CPT | Mod: CPTII,,, | Performed by: PAIN MEDICINE

## 2021-08-30 PROCEDURE — G0481 DRUG SCREEN DEFINITIVE 14, URINE: ICD-10-PCS | Mod: ,,, | Performed by: CLINICAL MEDICAL LABORATORY

## 2021-08-30 PROCEDURE — 72110 X-RAY EXAM L-2 SPINE 4/>VWS: CPT | Mod: TC

## 2021-08-30 PROCEDURE — 1159F MED LIST DOCD IN RCRD: CPT | Mod: CPTII,,, | Performed by: PAIN MEDICINE

## 2021-08-30 PROCEDURE — 1125F PR PAIN SEVERITY QUANTIFIED, PAIN PRESENT: ICD-10-PCS | Mod: CPTII,,, | Performed by: PAIN MEDICINE

## 2021-08-30 PROCEDURE — 99214 OFFICE O/P EST MOD 30 MIN: CPT | Mod: PBBFAC | Performed by: PAIN MEDICINE

## 2021-08-30 PROCEDURE — 3288F FALL RISK ASSESSMENT DOCD: CPT | Mod: CPTII,,, | Performed by: PAIN MEDICINE

## 2021-08-30 PROCEDURE — G0481 DRUG TEST DEF 8-14 CLASSES: HCPCS | Mod: ,,, | Performed by: CLINICAL MEDICAL LABORATORY

## 2021-08-30 PROCEDURE — 99203 OFFICE O/P NEW LOW 30 MIN: CPT | Mod: S$PBB,,, | Performed by: PAIN MEDICINE

## 2021-08-30 PROCEDURE — 72110 XR LUMBAR SPINE 5 VIEW WITH FLEX AND EXT: ICD-10-PCS | Mod: 26,,, | Performed by: RADIOLOGY

## 2021-08-30 PROCEDURE — 3288F PR FALLS RISK ASSESSMENT DOCUMENTED: ICD-10-PCS | Mod: CPTII,,, | Performed by: PAIN MEDICINE

## 2021-08-30 PROCEDURE — 1101F PT FALLS ASSESS-DOCD LE1/YR: CPT | Mod: CPTII,,, | Performed by: PAIN MEDICINE

## 2021-08-30 PROCEDURE — 72110 X-RAY EXAM L-2 SPINE 4/>VWS: CPT | Mod: 26,,, | Performed by: RADIOLOGY

## 2021-08-30 PROCEDURE — 99203 PR OFFICE/OUTPT VISIT, NEW, LEVL III, 30-44 MIN: ICD-10-PCS | Mod: S$PBB,,, | Performed by: PAIN MEDICINE

## 2021-08-30 PROCEDURE — 1101F PR PT FALLS ASSESS DOC 0-1 FALLS W/OUT INJ PAST YR: ICD-10-PCS | Mod: CPTII,,, | Performed by: PAIN MEDICINE

## 2021-08-30 PROCEDURE — 1159F PR MEDICATION LIST DOCUMENTED IN MEDICAL RECORD: ICD-10-PCS | Mod: CPTII,,, | Performed by: PAIN MEDICINE

## 2021-08-30 PROCEDURE — 80305 DRUG TEST PRSMV DIR OPT OBS: CPT | Mod: PBBFAC | Performed by: PAIN MEDICINE

## 2021-08-30 RX ORDER — HYDROCODONE BITARTRATE AND ACETAMINOPHEN 10; 325 MG/1; MG/1
1 TABLET ORAL EVERY 8 HOURS PRN
COMMUNITY
End: 2021-09-07 | Stop reason: SDUPTHER

## 2021-09-07 ENCOUNTER — OFFICE VISIT (OUTPATIENT)
Dept: PAIN MEDICINE | Facility: CLINIC | Age: 86
End: 2021-09-07
Payer: MEDICARE

## 2021-09-07 VITALS
SYSTOLIC BLOOD PRESSURE: 172 MMHG | DIASTOLIC BLOOD PRESSURE: 94 MMHG | WEIGHT: 156 LBS | HEART RATE: 93 BPM | HEIGHT: 71 IN | BODY MASS INDEX: 21.84 KG/M2

## 2021-09-07 DIAGNOSIS — M85.89 OSTEOPENIA OF MULTIPLE SITES: ICD-10-CM

## 2021-09-07 DIAGNOSIS — G89.29 CHRONIC PAIN OF LEFT ANKLE: Chronic | ICD-10-CM

## 2021-09-07 DIAGNOSIS — M47.817 LUMBOSACRAL SPONDYLOSIS WITHOUT MYELOPATHY: Primary | Chronic | ICD-10-CM

## 2021-09-07 DIAGNOSIS — M89.49 OSTEOARTHROSIS MULTIPLE SITES, NOT SPECIFIED AS GENERALIZED: Chronic | ICD-10-CM

## 2021-09-07 DIAGNOSIS — M25.572 CHRONIC PAIN OF LEFT ANKLE: Chronic | ICD-10-CM

## 2021-09-07 PROCEDURE — 99214 OFFICE O/P EST MOD 30 MIN: CPT | Mod: S$PBB,,, | Performed by: PHYSICIAN ASSISTANT

## 2021-09-07 PROCEDURE — 99214 OFFICE O/P EST MOD 30 MIN: CPT | Mod: PBBFAC | Performed by: PHYSICIAN ASSISTANT

## 2021-09-07 PROCEDURE — 1159F PR MEDICATION LIST DOCUMENTED IN MEDICAL RECORD: ICD-10-PCS | Mod: CPTII,,, | Performed by: PHYSICIAN ASSISTANT

## 2021-09-07 PROCEDURE — 3288F FALL RISK ASSESSMENT DOCD: CPT | Mod: CPTII,,, | Performed by: PHYSICIAN ASSISTANT

## 2021-09-07 PROCEDURE — 99214 PR OFFICE/OUTPT VISIT, EST, LEVL IV, 30-39 MIN: ICD-10-PCS | Mod: S$PBB,,, | Performed by: PHYSICIAN ASSISTANT

## 2021-09-07 PROCEDURE — 1125F AMNT PAIN NOTED PAIN PRSNT: CPT | Mod: CPTII,,, | Performed by: PHYSICIAN ASSISTANT

## 2021-09-07 PROCEDURE — 1159F MED LIST DOCD IN RCRD: CPT | Mod: CPTII,,, | Performed by: PHYSICIAN ASSISTANT

## 2021-09-07 PROCEDURE — 1125F PR PAIN SEVERITY QUANTIFIED, PAIN PRESENT: ICD-10-PCS | Mod: CPTII,,, | Performed by: PHYSICIAN ASSISTANT

## 2021-09-07 PROCEDURE — 1101F PT FALLS ASSESS-DOCD LE1/YR: CPT | Mod: CPTII,,, | Performed by: PHYSICIAN ASSISTANT

## 2021-09-07 PROCEDURE — 1101F PR PT FALLS ASSESS DOC 0-1 FALLS W/OUT INJ PAST YR: ICD-10-PCS | Mod: CPTII,,, | Performed by: PHYSICIAN ASSISTANT

## 2021-09-07 PROCEDURE — 3288F PR FALLS RISK ASSESSMENT DOCUMENTED: ICD-10-PCS | Mod: CPTII,,, | Performed by: PHYSICIAN ASSISTANT

## 2021-09-07 RX ORDER — HYDROCODONE BITARTRATE AND ACETAMINOPHEN 10; 325 MG/1; MG/1
1 TABLET ORAL EVERY 8 HOURS
Qty: 90 TABLET | Refills: 0 | Status: SHIPPED | OUTPATIENT
Start: 2021-09-07 | End: 2021-10-07

## 2021-09-07 RX ORDER — HYDROCODONE BITARTRATE AND ACETAMINOPHEN 10; 325 MG/1; MG/1
1 TABLET ORAL EVERY 8 HOURS
Qty: 90 TABLET | Refills: 0 | Status: SHIPPED | OUTPATIENT
Start: 2021-10-07 | End: 2021-11-03 | Stop reason: SDUPTHER

## 2021-09-08 ENCOUNTER — OFFICE VISIT (OUTPATIENT)
Dept: FAMILY MEDICINE | Facility: CLINIC | Age: 86
End: 2021-09-08
Payer: MEDICARE

## 2021-09-08 VITALS
HEIGHT: 71 IN | HEART RATE: 85 BPM | TEMPERATURE: 98 F | RESPIRATION RATE: 22 BRPM | BODY MASS INDEX: 21.84 KG/M2 | SYSTOLIC BLOOD PRESSURE: 159 MMHG | WEIGHT: 156 LBS | DIASTOLIC BLOOD PRESSURE: 87 MMHG | OXYGEN SATURATION: 93 %

## 2021-09-08 DIAGNOSIS — E55.9 VITAMIN D DEFICIENCY: Primary | ICD-10-CM

## 2021-09-08 DIAGNOSIS — M85.89 OSTEOPENIA OF MULTIPLE SITES: ICD-10-CM

## 2021-09-08 DIAGNOSIS — I10 HYPERTENSION, UNSPECIFIED TYPE: ICD-10-CM

## 2021-09-08 DIAGNOSIS — M47.817 LUMBOSACRAL SPONDYLOSIS WITHOUT MYELOPATHY: Chronic | ICD-10-CM

## 2021-09-08 LAB
CREAT UR-MCNC: NORMAL MG/DL
PH UR STRIP: NORMAL [PH]
SP GR UR STRIP: NORMAL

## 2021-09-08 PROCEDURE — 99213 PR OFFICE/OUTPT VISIT, EST, LEVL III, 20-29 MIN: ICD-10-PCS | Mod: GC,,, | Performed by: FAMILY MEDICINE

## 2021-09-08 PROCEDURE — 99213 OFFICE O/P EST LOW 20 MIN: CPT | Mod: GC,,, | Performed by: FAMILY MEDICINE

## 2021-09-08 RX ORDER — ERGOCALCIFEROL 1.25 MG/1
50000 CAPSULE ORAL
Qty: 4 CAPSULE | Refills: 1 | Status: SHIPPED | OUTPATIENT
Start: 2021-09-08 | End: 2021-10-28

## 2021-09-10 PROBLEM — E55.9 VITAMIN D DEFICIENCY: Status: ACTIVE | Noted: 2021-09-10

## 2021-09-20 ENCOUNTER — HOSPITAL ENCOUNTER (OUTPATIENT)
Dept: RADIOLOGY | Facility: HOSPITAL | Age: 86
Discharge: HOME | End: 2021-09-20
Attending: FAMILY MEDICINE
Payer: MEDICARE

## 2021-09-20 DIAGNOSIS — M85.89 OSTEOPENIA OF MULTIPLE SITES: ICD-10-CM

## 2021-09-20 PROCEDURE — 77080 DXA BONE DENSITY AXIAL: CPT | Mod: 26,,, | Performed by: RADIOLOGY

## 2021-09-20 PROCEDURE — 77080 DEXA BONE DENSITY SPINE HIP: ICD-10-PCS | Mod: 26,,, | Performed by: RADIOLOGY

## 2021-09-20 PROCEDURE — 77080 DXA BONE DENSITY AXIAL: CPT | Mod: TC

## 2021-11-02 ENCOUNTER — OFFICE VISIT (OUTPATIENT)
Dept: FAMILY MEDICINE | Facility: CLINIC | Age: 86
End: 2021-11-02
Payer: MEDICARE

## 2021-11-02 VITALS
RESPIRATION RATE: 20 BRPM | WEIGHT: 161 LBS | DIASTOLIC BLOOD PRESSURE: 91 MMHG | HEIGHT: 71 IN | OXYGEN SATURATION: 93 % | HEART RATE: 83 BPM | BODY MASS INDEX: 22.54 KG/M2 | TEMPERATURE: 98 F | SYSTOLIC BLOOD PRESSURE: 155 MMHG

## 2021-11-02 DIAGNOSIS — I10 HYPERTENSION, UNSPECIFIED TYPE: ICD-10-CM

## 2021-11-02 DIAGNOSIS — H61.23 IMPACTED CERUMEN, BILATERAL: ICD-10-CM

## 2021-11-02 DIAGNOSIS — M47.817 LUMBOSACRAL SPONDYLOSIS WITHOUT MYELOPATHY: Chronic | ICD-10-CM

## 2021-11-02 DIAGNOSIS — M85.89 OSTEOPENIA OF MULTIPLE SITES: ICD-10-CM

## 2021-11-02 DIAGNOSIS — E55.9 VITAMIN D INSUFFICIENCY: Primary | ICD-10-CM

## 2021-11-02 DIAGNOSIS — H61.23 IMPACTED CERUMEN OF BOTH EARS: ICD-10-CM

## 2021-11-02 DIAGNOSIS — E55.9 VITAMIN D DEFICIENCY: ICD-10-CM

## 2021-11-02 DIAGNOSIS — Z23 INFLUENZA VACCINE NEEDED: ICD-10-CM

## 2021-11-02 PROBLEM — M89.49 OSTEOARTHROSIS MULTIPLE SITES, NOT SPECIFIED AS GENERALIZED: Chronic | Status: RESOLVED | Noted: 2021-09-07 | Resolved: 2021-11-02

## 2021-11-02 LAB — 25(OH)D3 SERPL-MCNC: 52.4 NG/ML

## 2021-11-02 PROCEDURE — 90662 IIV NO PRSV INCREASED AG IM: CPT | Mod: ,,, | Performed by: FAMILY MEDICINE

## 2021-11-02 PROCEDURE — 99212 PR OFFICE/OUTPT VISIT, EST, LEVL II, 10-19 MIN: ICD-10-PCS | Mod: 25,GC,, | Performed by: FAMILY MEDICINE

## 2021-11-02 PROCEDURE — 82306 VITAMIN D 25 HYDROXY: CPT | Mod: ,,, | Performed by: CLINICAL MEDICAL LABORATORY

## 2021-11-02 PROCEDURE — G0008 ADMIN INFLUENZA VIRUS VAC: HCPCS | Mod: ,,, | Performed by: FAMILY MEDICINE

## 2021-11-02 PROCEDURE — 82306 VITAMIN D: ICD-10-PCS | Mod: ,,, | Performed by: CLINICAL MEDICAL LABORATORY

## 2021-11-02 PROCEDURE — 99212 OFFICE O/P EST SF 10 MIN: CPT | Mod: 25,GC,, | Performed by: FAMILY MEDICINE

## 2021-11-02 PROCEDURE — G0008 FLU VACCINE - QUADRIVALENT - HIGH DOSE (65+) PRESERVATIVE FREE IM: ICD-10-PCS | Mod: ,,, | Performed by: FAMILY MEDICINE

## 2021-11-02 PROCEDURE — 90662 FLU VACCINE - QUADRIVALENT - HIGH DOSE (65+) PRESERVATIVE FREE IM: ICD-10-PCS | Mod: ,,, | Performed by: FAMILY MEDICINE

## 2021-11-04 ENCOUNTER — OFFICE VISIT (OUTPATIENT)
Dept: PAIN MEDICINE | Facility: CLINIC | Age: 86
End: 2021-11-04
Payer: MEDICARE

## 2021-11-04 VITALS
RESPIRATION RATE: 18 BRPM | BODY MASS INDEX: 23.52 KG/M2 | HEIGHT: 71 IN | DIASTOLIC BLOOD PRESSURE: 79 MMHG | SYSTOLIC BLOOD PRESSURE: 100 MMHG | HEART RATE: 76 BPM | WEIGHT: 168 LBS

## 2021-11-04 DIAGNOSIS — M89.49 OSTEOARTHROSIS MULTIPLE SITES, NOT SPECIFIED AS GENERALIZED: Chronic | ICD-10-CM

## 2021-11-04 DIAGNOSIS — M47.817 LUMBOSACRAL SPONDYLOSIS WITHOUT MYELOPATHY: Primary | Chronic | ICD-10-CM

## 2021-11-04 DIAGNOSIS — Z79.899 ENCOUNTER FOR LONG-TERM (CURRENT) USE OF OTHER MEDICATIONS: ICD-10-CM

## 2021-11-04 DIAGNOSIS — G89.29 CHRONIC PAIN OF LEFT ANKLE: Chronic | ICD-10-CM

## 2021-11-04 DIAGNOSIS — M25.572 CHRONIC PAIN OF LEFT ANKLE: Chronic | ICD-10-CM

## 2021-11-04 LAB
CTP QC/QA: YES
POC (AMP) AMPHETAMINE: NEGATIVE
POC (BAR) BARBITURATES: NEGATIVE
POC (BUP) BUPRENORPHINE: NEGATIVE
POC (BZO) BENZODIAZEPINES: NEGATIVE
POC (COC) COCAINE: NEGATIVE
POC (MDMA) METHYLENEDIOXYMETHAMPHETAMINE 3,4: NEGATIVE
POC (MET) METHAMPHETAMINE: NEGATIVE
POC (MOP) OPIATES: ABNORMAL
POC (MTD) METHADONE: NEGATIVE
POC (OXY) OXYCODONE: NEGATIVE
POC (PCP) PHENCYCLIDINE: NEGATIVE
POC (TCA) TRICYCLIC ANTIDEPRESSANTS: NEGATIVE
POC TEMPERATURE (URINE): 90
POC THC: NEGATIVE

## 2021-11-04 PROCEDURE — 80305 DRUG TEST PRSMV DIR OPT OBS: CPT | Mod: PBBFAC | Performed by: PHYSICIAN ASSISTANT

## 2021-11-04 PROCEDURE — 1159F PR MEDICATION LIST DOCUMENTED IN MEDICAL RECORD: ICD-10-PCS | Mod: CPTII,,, | Performed by: PHYSICIAN ASSISTANT

## 2021-11-04 PROCEDURE — 3288F FALL RISK ASSESSMENT DOCD: CPT | Mod: CPTII,,, | Performed by: PHYSICIAN ASSISTANT

## 2021-11-04 PROCEDURE — 1101F PT FALLS ASSESS-DOCD LE1/YR: CPT | Mod: CPTII,,, | Performed by: PHYSICIAN ASSISTANT

## 2021-11-04 PROCEDURE — 1159F MED LIST DOCD IN RCRD: CPT | Mod: CPTII,,, | Performed by: PHYSICIAN ASSISTANT

## 2021-11-04 PROCEDURE — 99214 OFFICE O/P EST MOD 30 MIN: CPT | Mod: PBBFAC | Performed by: PHYSICIAN ASSISTANT

## 2021-11-04 PROCEDURE — 1101F PR PT FALLS ASSESS DOC 0-1 FALLS W/OUT INJ PAST YR: ICD-10-PCS | Mod: CPTII,,, | Performed by: PHYSICIAN ASSISTANT

## 2021-11-04 PROCEDURE — 3288F PR FALLS RISK ASSESSMENT DOCUMENTED: ICD-10-PCS | Mod: CPTII,,, | Performed by: PHYSICIAN ASSISTANT

## 2021-11-04 PROCEDURE — 99214 OFFICE O/P EST MOD 30 MIN: CPT | Mod: S$PBB,,, | Performed by: PHYSICIAN ASSISTANT

## 2021-11-04 PROCEDURE — 1125F PR PAIN SEVERITY QUANTIFIED, PAIN PRESENT: ICD-10-PCS | Mod: CPTII,,, | Performed by: PHYSICIAN ASSISTANT

## 2021-11-04 PROCEDURE — 99214 PR OFFICE/OUTPT VISIT, EST, LEVL IV, 30-39 MIN: ICD-10-PCS | Mod: S$PBB,,, | Performed by: PHYSICIAN ASSISTANT

## 2021-11-04 PROCEDURE — 1125F AMNT PAIN NOTED PAIN PRSNT: CPT | Mod: CPTII,,, | Performed by: PHYSICIAN ASSISTANT

## 2021-11-04 RX ORDER — HYDROCODONE BITARTRATE AND ACETAMINOPHEN 10; 325 MG/1; MG/1
1 TABLET ORAL EVERY 8 HOURS
Qty: 90 TABLET | Refills: 0 | Status: SHIPPED | OUTPATIENT
Start: 2021-12-06 | End: 2022-01-05

## 2021-11-04 RX ORDER — HYDROCODONE BITARTRATE AND ACETAMINOPHEN 10; 325 MG/1; MG/1
1 TABLET ORAL EVERY 8 HOURS
Qty: 90 TABLET | Refills: 0 | Status: SHIPPED | OUTPATIENT
Start: 2022-01-05 | End: 2022-02-02 | Stop reason: SDUPTHER

## 2021-11-04 RX ORDER — HYDROCODONE BITARTRATE AND ACETAMINOPHEN 10; 325 MG/1; MG/1
1 TABLET ORAL EVERY 8 HOURS
Qty: 90 TABLET | Refills: 0 | Status: SHIPPED | OUTPATIENT
Start: 2021-11-06 | End: 2021-12-06

## 2021-11-16 RX ORDER — CALCIUM CARB/VITAMIN D3/VIT K1 500-500-40
800 TABLET,CHEWABLE ORAL DAILY
Qty: 60 CAPSULE | Refills: 5 | Status: SHIPPED | OUTPATIENT
Start: 2021-11-16 | End: 2022-05-15

## 2022-02-02 NOTE — PROGRESS NOTES
Disclaimer:  This note has been generated using voice recognition software.  There may be type of graft focal areas that have been missed during a proof reading      Subjective:      Patient ID: Coleman Lares is a 89 y.o. male.    Chief Complaint: Low-back Pain and Leg Pain      Pain  This is a chronic problem. The current episode started more than 1 year ago. The problem occurs daily. The problem has been waxing and waning. Associated symptoms include arthralgias and neck pain. Pertinent negatives include no change in bowel habit, chest pain, chills, coughing, diaphoresis, fatigue, fever, rash, sore throat, vertigo or vomiting.     Review of Systems   Constitutional: Negative for activity change, chills, diaphoresis, fatigue, fever and unexpected weight change.   HENT: Negative for drooling, ear discharge, ear pain, facial swelling, nosebleeds, sore throat, trouble swallowing, voice change and goiter.    Eyes: Negative for photophobia, pain, discharge, redness and visual disturbance.   Respiratory: Negative for apnea, cough, choking, chest tightness, shortness of breath, wheezing and stridor.    Cardiovascular: Negative for chest pain, palpitations and leg swelling.   Gastrointestinal: Negative for abdominal distention, change in bowel habit, diarrhea, rectal pain, vomiting, fecal incontinence and change in bowel habit.   Endocrine: Negative for cold intolerance, heat intolerance, polydipsia, polyphagia and polyuria.   Genitourinary: Negative for bladder incontinence, dysuria, flank pain, frequency and hot flashes.   Musculoskeletal: Positive for arthralgias, back pain, leg pain and neck pain.   Integumentary:  Negative for color change, pallor and rash.   Allergic/Immunologic: Negative for immunocompromised state.   Neurological: Negative for dizziness, vertigo, seizures, syncope, facial asymmetry, speech difficulty, light-headedness, disturbances in coordination, memory loss and coordination difficulties.  "  Hematological: Negative for adenopathy. Does not bruise/bleed easily.   Psychiatric/Behavioral: Negative for agitation, behavioral problems, confusion, decreased concentration, dysphoric mood, hallucinations, self-injury and suicidal ideas. The patient is not nervous/anxious and is not hyperactive.             Objective:  Vitals:    02/03/22 1012 02/03/22 1013   BP: 114/87    Pulse: 71    Resp: 18    Weight: 77.1 kg (170 lb)    Height: 5' 11" (1.803 m)    PainSc:   7   7         Physical Exam  Vitals and nursing note reviewed. Exam conducted with a chaperone present.   Constitutional:       General: He is awake.      Appearance: Normal appearance. He is not ill-appearing or diaphoretic.   HENT:      Head: Normocephalic and atraumatic.      Nose: Nose normal.      Mouth/Throat:      Mouth: Mucous membranes are moist.      Pharynx: Oropharynx is clear.   Eyes:      Conjunctiva/sclera: Conjunctivae normal.      Pupils: Pupils are equal, round, and reactive to light.   Cardiovascular:      Rate and Rhythm: Normal rate.   Pulmonary:      Effort: Pulmonary effort is normal. No respiratory distress.   Abdominal:      Palpations: Abdomen is soft.   Musculoskeletal:         General: Normal range of motion.      Cervical back: Normal range of motion and neck supple.   Skin:     General: Skin is warm and dry.   Neurological:      General: No focal deficit present.      Mental Status: He is alert and oriented to person, place, and time. Mental status is at baseline.      Cranial Nerves: Cranial nerves are intact. No cranial nerve deficit (II-XII).   Psychiatric:         Mood and Affect: Mood normal.         Behavior: Behavior normal. Behavior is cooperative.         Thought Content: Thought content normal.           Orders Placed This Encounter   Procedures    POCT Urine Drug Screen Presump     Interpretive Information:     Negative:  No drug detected at the cut off level.   Positive:  This result represents presumptive " positive for the   tested drug, other substances may yield a positive response other   than the analyte of interest. This result should be utilized for   diagnostic purpose only. Confirmation testing will be performed upon physician request only.           DXA Bone Density Spine And Hip  Narrative: EXAMINATION:  DEXA BONE DENSITY SPINE HIP    CLINICAL HISTORY:  Other specified disorders of bone density and structure, multiple sites    COMPARISON:  None    FINDINGS:  S shaped curvature of the spine.    L1-L4 spine:    BMD: 1.112 g/cm^2    T-score: 0.2    Z-score: 1.5    Left femur/hip:    BMD: 0.611 g/cm^2    T-score: -2.3    Z-score: -0.6  Impression: T score of -2.3 obtained from the left femoral neck which is considered in the range of osteopenia.    10-year risk for major osteoporotic fracture considered 10 % without prior fracture and 15 % with prior fracture. 10-year risk for hip fracture considered 5.3 % without prior fracture and 7.0 % with prior fracture.    Recommendations:    1. Encourage adequate intake of calcium and vitamin D if clinically appropriate.    2. Consider regular weight-bearing and muscle strengthening exercises if clinically appropriate.    3. Consider hormone replacement therapy if clinically appropriate.    4. Consider antiresorptive therapy if clinically appropriate.    5. Consider followup BMD every 1-2 years if clinically appropriate.    6. Advise patient to avoid tobacco smoking and to not over use alcohol.    Place of service: [facility].    Electronically signed by: Del Brown  Date:    09/20/2021  Time:    10:21       Office Visit on 11/04/2021   Component Date Value Ref Range Status    POC Amphetamines 11/04/2021 Negative  Negative, Inconclusive Final    POC Barbiturates 11/04/2021 Negative  Negative, Inconclusive Final    POC Benzodiazepines 11/04/2021 Negative  Negative, Inconclusive Final    POC Cocaine 11/04/2021 Negative  Negative, Inconclusive Final    POC THC  11/04/2021 Negative  Negative, Inconclusive Final    POC Methadone 11/04/2021 Negative  Negative, Inconclusive Final    POC Methamphetamine 11/04/2021 Negative  Negative, Inconclusive Final    POC Opiates 11/04/2021 Presumptive Positive* Negative, Inconclusive Final    POC Oxycodone 11/04/2021 Negative  Negative, Inconclusive Final    POC Phencyclidine 11/04/2021 Negative  Negative, Inconclusive Final    POC Methylenedioxymethamphetamine * 11/04/2021 Negative  Negative, Inconclusive Final    POC Tricyclic Antidepressants 11/04/2021 Negative  Negative, Inconclusive Final    POC Buprenorphine 11/04/2021 Negative   Final     Acceptable 11/04/2021 Yes   Final    POC Temperature (Urine) 11/04/2021 90   Final   Office Visit on 11/02/2021   Component Date Value Ref Range Status    Vitamin D 25-Hydroxy, Blood 11/02/2021 52.4  ng/mL Final   Office Visit on 08/30/2021   Component Date Value Ref Range Status    POC Amphetamines 08/30/2021 Negative  Negative, Inconclusive Final    POC Barbiturates 08/30/2021 Negative  Negative, Inconclusive Final    POC Benzodiazepines 08/30/2021 Negative  Negative, Inconclusive Final    POC Cocaine 08/30/2021 Negative  Negative, Inconclusive Final    POC THC 08/30/2021 Negative  Negative, Inconclusive Final    POC Methadone 08/30/2021 Negative  Negative, Inconclusive Final    POC Methamphetamine 08/30/2021 Negative  Negative, Inconclusive Final    POC Opiates 08/30/2021 Presumptive Positive* Negative, Inconclusive Final    POC Oxycodone 08/30/2021 Negative  Negative, Inconclusive Final    POC Phencyclidine 08/30/2021 Negative  Negative, Inconclusive Final    POC Methylenedioxymethamphetamine * 08/30/2021 Negative  Negative, Inconclusive Final    POC Tricyclic Antidepressants 08/30/2021 Negative  Negative, Inconclusive Final    POC Buprenorphine 08/30/2021 Negative   Final     Acceptable 08/30/2021 Yes   Final    POC Temperature (Urine)  08/30/2021 94   Final    pH, UA 08/30/2021    Final    Specific [place], UA 08/30/2021    Final    Creatinine, Urine 08/30/2021    Final         Assessment:      1. Chronic pain of left ankle history ORIF    2. Lumbosacral spondylosis without myelopathy    3. Osteoarthrosis multiple sites, not specified as generalized    4. Encounter for long-term (current) use of other medications            A's of Opioid Risk Assessment  Activity:Patient can perform ADL.   Analgesia:Patients pain is partially controlled by current medication. Patient has tried OTC medications such as Tylenol and Ibuprofen with out relief.   Adverse Effects: Patient denies constipation or sedation.  Aberrant Behavior:  reviewed with no aberrant drug seeking/taking behavior.  Overdose reversal drug naloxone discussed    Drug screen reviewed      Requested Prescriptions     Signed Prescriptions Disp Refills    HYDROcodone-acetaminophen (NORCO)  mg per tablet 90 tablet 0     Sig: Take 1 tablet by mouth every 8 (eight) hours.    HYDROcodone-acetaminophen (NORCO)  mg per tablet 90 tablet 0     Sig: Take 1 tablet by mouth every 8 (eight) hours.    HYDROcodone-acetaminophen (NORCO)  mg per tablet 90 tablet 0     Sig: Take 1 tablet by mouth every 8 (eight) hours.         Plan:    History left ankle ORIF    He states current medications helping control his discomfort    Not interested in physical therapy    He would like to continue with conservative management    Continue home exercise program as directed    Continue current medication    Follow-up 3 months    Dr. Beard, August 2022    Bring original prescription medication bottles/container/box with labels to each visit

## 2022-02-03 ENCOUNTER — OFFICE VISIT (OUTPATIENT)
Dept: PAIN MEDICINE | Facility: CLINIC | Age: 87
End: 2022-02-03
Payer: MEDICARE

## 2022-02-03 VITALS
HEART RATE: 71 BPM | HEIGHT: 71 IN | RESPIRATION RATE: 18 BRPM | WEIGHT: 170 LBS | SYSTOLIC BLOOD PRESSURE: 114 MMHG | BODY MASS INDEX: 23.8 KG/M2 | DIASTOLIC BLOOD PRESSURE: 87 MMHG

## 2022-02-03 DIAGNOSIS — M25.572 CHRONIC PAIN OF LEFT ANKLE: Primary | Chronic | ICD-10-CM

## 2022-02-03 DIAGNOSIS — M89.49 OSTEOARTHROSIS MULTIPLE SITES, NOT SPECIFIED AS GENERALIZED: Chronic | ICD-10-CM

## 2022-02-03 DIAGNOSIS — M47.817 LUMBOSACRAL SPONDYLOSIS WITHOUT MYELOPATHY: Chronic | ICD-10-CM

## 2022-02-03 DIAGNOSIS — Z79.899 ENCOUNTER FOR LONG-TERM (CURRENT) USE OF OTHER MEDICATIONS: ICD-10-CM

## 2022-02-03 DIAGNOSIS — G89.29 CHRONIC PAIN OF LEFT ANKLE: Primary | Chronic | ICD-10-CM

## 2022-02-03 PROCEDURE — 3288F FALL RISK ASSESSMENT DOCD: CPT | Mod: CPTII,,, | Performed by: PHYSICIAN ASSISTANT

## 2022-02-03 PROCEDURE — 1101F PT FALLS ASSESS-DOCD LE1/YR: CPT | Mod: CPTII,,, | Performed by: PHYSICIAN ASSISTANT

## 2022-02-03 PROCEDURE — 1159F MED LIST DOCD IN RCRD: CPT | Mod: CPTII,,, | Performed by: PHYSICIAN ASSISTANT

## 2022-02-03 PROCEDURE — 80305 DRUG TEST PRSMV DIR OPT OBS: CPT | Mod: PBBFAC | Performed by: PHYSICIAN ASSISTANT

## 2022-02-03 PROCEDURE — 1159F PR MEDICATION LIST DOCUMENTED IN MEDICAL RECORD: ICD-10-PCS | Mod: CPTII,,, | Performed by: PHYSICIAN ASSISTANT

## 2022-02-03 PROCEDURE — 99214 PR OFFICE/OUTPT VISIT, EST, LEVL IV, 30-39 MIN: ICD-10-PCS | Mod: S$PBB,,, | Performed by: PHYSICIAN ASSISTANT

## 2022-02-03 PROCEDURE — 1101F PR PT FALLS ASSESS DOC 0-1 FALLS W/OUT INJ PAST YR: ICD-10-PCS | Mod: CPTII,,, | Performed by: PHYSICIAN ASSISTANT

## 2022-02-03 PROCEDURE — 1125F AMNT PAIN NOTED PAIN PRSNT: CPT | Mod: CPTII,,, | Performed by: PHYSICIAN ASSISTANT

## 2022-02-03 PROCEDURE — 3288F PR FALLS RISK ASSESSMENT DOCUMENTED: ICD-10-PCS | Mod: CPTII,,, | Performed by: PHYSICIAN ASSISTANT

## 2022-02-03 PROCEDURE — 1125F PR PAIN SEVERITY QUANTIFIED, PAIN PRESENT: ICD-10-PCS | Mod: CPTII,,, | Performed by: PHYSICIAN ASSISTANT

## 2022-02-03 PROCEDURE — 99214 OFFICE O/P EST MOD 30 MIN: CPT | Mod: S$PBB,,, | Performed by: PHYSICIAN ASSISTANT

## 2022-02-03 PROCEDURE — 99214 OFFICE O/P EST MOD 30 MIN: CPT | Mod: PBBFAC | Performed by: PHYSICIAN ASSISTANT

## 2022-02-03 RX ORDER — HYDROCODONE BITARTRATE AND ACETAMINOPHEN 10; 325 MG/1; MG/1
1 TABLET ORAL EVERY 8 HOURS
Qty: 90 TABLET | Refills: 0 | Status: SHIPPED | OUTPATIENT
Start: 2022-02-05 | End: 2022-03-07

## 2022-02-03 RX ORDER — HYDROCODONE BITARTRATE AND ACETAMINOPHEN 10; 325 MG/1; MG/1
1 TABLET ORAL EVERY 8 HOURS
Qty: 90 TABLET | Refills: 0 | Status: SHIPPED | OUTPATIENT
Start: 2022-03-07 | End: 2022-04-06

## 2022-02-03 RX ORDER — HYDROCODONE BITARTRATE AND ACETAMINOPHEN 10; 325 MG/1; MG/1
1 TABLET ORAL EVERY 8 HOURS
Qty: 90 TABLET | Refills: 0 | Status: SHIPPED | OUTPATIENT
Start: 2022-04-06 | End: 2022-05-03 | Stop reason: SDUPTHER

## 2022-02-08 ENCOUNTER — OFFICE VISIT (OUTPATIENT)
Dept: FAMILY MEDICINE | Facility: CLINIC | Age: 87
End: 2022-02-08
Payer: MEDICARE

## 2022-02-08 VITALS
BODY MASS INDEX: 21.42 KG/M2 | WEIGHT: 153 LBS | HEIGHT: 71 IN | RESPIRATION RATE: 22 BRPM | TEMPERATURE: 98 F | SYSTOLIC BLOOD PRESSURE: 128 MMHG | HEART RATE: 67 BPM | OXYGEN SATURATION: 98 % | DIASTOLIC BLOOD PRESSURE: 88 MMHG

## 2022-02-08 DIAGNOSIS — L82.1 RAISED SEBORRHEIC KERATOSIS: ICD-10-CM

## 2022-02-08 DIAGNOSIS — R63.4 ABNORMAL WEIGHT LOSS: Primary | ICD-10-CM

## 2022-02-08 PROCEDURE — 99214 PR OFFICE/OUTPT VISIT, EST, LEVL IV, 30-39 MIN: ICD-10-PCS | Mod: GC,,, | Performed by: FAMILY MEDICINE

## 2022-02-08 PROCEDURE — 99214 OFFICE O/P EST MOD 30 MIN: CPT | Mod: GC,,, | Performed by: FAMILY MEDICINE

## 2022-02-08 NOTE — PROGRESS NOTES
Subjective:       Patient ID: Coleman Lares is a 89 y.o. male.    Chief Complaint: Follow-up (3 months)    89 yo WM with PMH of HTN, HLD, chronic back pain, CAD, OA, moderate aortic regurgitation, pulmonary stenosis and COPD.    Presents today complaining of unexpected weight loss since November. Reports that has has been having decrease appetite for a couple months and worsened for the past 1 week. He reports food tastes the same; however, he does not feel as hungry lately. Reports about 10 lb weight loss; weight was 161 lb in November and 153 today. He denies N/V, chest pain, SOB, abd pain, bloody stool, night sweats, hematuria. He reports last colonoscopy was greater than 10 years ago and was told it was normal and that he did not require future colonoscopy. He does report family history of lung cancer. Sister had lung cancer. He reports smoking 1-2 ppd for about 57 years; quit in early 2000's. Instructed patient to start taking Boost or Ensure with meals. Will check weight at f/u visit. Discussed possibility of cancer and workup with CBC, CMP, TSH, UA, cxr with possible need for further imaging with CT chest/abd. He declined labs and imaging today. Patient states he will think about it and discuss again at f/u visit in 1 month.     ------------------  Cardiologist: Dr. Langley at [facility]  Pain: Dr. Beard/ [facility]        Current Outpatient Medications:     acetaminophen (TYLENOL) 325 MG tablet, Take 325 mg by mouth every 6 (six) hours as needed for Pain., Disp: , Rfl:     aspirin (ECOTRIN) 81 MG EC tablet, Take 1 tablet (81 mg total) by mouth once daily., Disp: 30 tablet, Rfl: 5    carvediloL (COREG) 6.25 MG tablet, Take 6.25 mg by mouth once daily. , Disp: , Rfl:     cholecalciferol, vitamin D3, 10 mcg (400 unit) Cap, Take 2 capsules (800 Units total) by mouth once daily., Disp: 60 capsule, Rfl: 5    HYDROcodone-acetaminophen (NORCO)  mg per tablet, Take 1 tablet by mouth every 8 (eight) hours.,  Disp: 90 tablet, Rfl: 0    losartan (COZAAR) 50 MG tablet, Take 1 tablet (50 mg total) by mouth once daily., Disp: 30 tablet, Rfl: 0    NIFEdipine (ADALAT CC) 30 MG TbSR, Take 30 mg by mouth once daily., Disp: , Rfl:     simvastatin (ZOCOR) 80 MG tablet, Take 80 mg by mouth every evening. Take 1/2 tablet once at night., Disp: , Rfl:     [START ON 3/7/2022] HYDROcodone-acetaminophen (NORCO)  mg per tablet, Take 1 tablet by mouth every 8 (eight) hours., Disp: 90 tablet, Rfl: 0    [START ON 4/6/2022] HYDROcodone-acetaminophen (NORCO)  mg per tablet, Take 1 tablet by mouth every 8 (eight) hours., Disp: 90 tablet, Rfl: 0    traZODone (DESYREL) 50 MG tablet, Take 1 tablet (50 mg total) by mouth every evening., Disp: 30 tablet, Rfl: 0    Review of patient's allergies indicates:  No Known Allergies    Past Medical History:   Diagnosis Date    Aortic regurgitation     Back pain     CAD (coronary artery disease)     Chronic back pain     Constipation     COPD (chronic obstructive pulmonary disease)     Generalized OA     Hyperlipidemia     Hypertension     Osteopenia determined by x-ray     Pulmonary stenosis        Past Surgical History:   Procedure Laterality Date    CORONARY ANGIOPLASTY WITH STENT PLACEMENT      FOOT SURGERY  2021    Dr Jackson    HIP SURGERY      LITHOTRIPSY         Family History   Problem Relation Age of Onset    Cancer Mother     Cancer Sister     Cancer Brother        Social History     Tobacco Use    Smoking status: Former Smoker    Smokeless tobacco: Never Used   Substance Use Topics    Alcohol use: Never    Drug use: Never       Review of Systems   Constitutional: Positive for unexpected weight change. Negative for chills, fatigue and fever.   HENT: Negative for nasal congestion, hearing loss, sinus pressure/congestion, sore throat and trouble swallowing.    Eyes: Negative for visual disturbance.   Respiratory: Negative for cough, shortness of breath and  wheezing.    Cardiovascular: Negative for chest pain, palpitations and leg swelling.   Gastrointestinal: Negative for abdominal pain, blood in stool, constipation, diarrhea, nausea and vomiting.   Genitourinary: Negative for difficulty urinating and hematuria.   Musculoskeletal: Negative for arthralgias and myalgias.   Integumentary:  Negative for rash and mole/lesion.   Neurological: Negative for light-headedness and headaches.   Psychiatric/Behavioral: Negative for behavioral problems.   All other systems reviewed and are negative.      PHQ  1. Little interest or pleasure in doing things? yes,  Not at all                       = 0    2. Feeling down, depressed, or hopeless? no, Not at all                       = 0    3. Trouble falling or staying asleep, or sleeping too much? yes, Several days                = 1    4. Feeling tired or having little energy? no, Not at all                       = 0    5. Poor appetite or overeating? yes, Nearly every day           = 3    6. Feeling bad about yourself- or that you are a failure or have let yourself or your family down? no, Not at all                       = 0    7. Trouble concentrating on things, such as reading the newspaper or watching TV? no, Not at all                       = 0    8. Moving or speaking so slowly that other people could have noticed? Or the opposite- being so fidgety or restless that you have been moving around a lot more than usual? yes, Several days                = 1    9. Thoughts that you would be better off dead or of hurting yourself in some way? no, Not at all                       = 0    Total Score: 5     How difficult have these problems made it for you to do your work, take care of things at home, or get along with other people? no, Not at all                       = 0    Scale:   0-4= No intervention  5-9= Recheck next visit and make patient aware of resources  10-14= Call  and consider initiation of antidepressant with  "MD  15-19= Call  to refer to Psychiatry and start antidepressant  20-27= Call social work and consult Psychiatry    Current Medications:   Home Psychiatric Meds:   Psychotherapeutics (From admission, onward)            None              Objective:      Vitals:    02/08/22 0859 02/08/22 0930   BP: (!) 149/78 128/88   BP Location: Right arm Left arm   Patient Position: Sitting Sitting   Pulse: 67    Resp: (!) 22    Temp: 97.6 °F (36.4 °C)    TempSrc: Temporal    SpO2: 98%    Weight: 69.4 kg (153 lb)    Height: 5' 11" (1.803 m)      Physical Exam  Vitals reviewed.   Constitutional:       General: He is not in acute distress.     Appearance: Normal appearance. He is not ill-appearing or toxic-appearing.      Comments: Cachetic appearing.   HENT:      Head: Normocephalic and atraumatic.      Right Ear: External ear normal.      Left Ear: External ear normal.      Nose: Nose normal.   Eyes:      General:         Right eye: No discharge.         Left eye: No discharge.      Extraocular Movements: Extraocular movements intact.   Cardiovascular:      Rate and Rhythm: Normal rate and regular rhythm.      Pulses: Normal pulses.      Heart sounds: Normal heart sounds. No murmur heard.      Pulmonary:      Effort: Pulmonary effort is normal. No respiratory distress.      Breath sounds: Normal breath sounds. No wheezing.   Abdominal:      General: Abdomen is flat. Bowel sounds are normal.      Palpations: Abdomen is soft. There is no mass.      Tenderness: There is no abdominal tenderness. There is no right CVA tenderness or left CVA tenderness.   Musculoskeletal:         General: No swelling or tenderness.      Cervical back: No tenderness.      Right lower leg: No edema.      Left lower leg: No edema.   Skin:     General: Skin is warm and dry.      Capillary Refill: Capillary refill takes less than 2 seconds.      Findings: No lesion or rash.      Comments: Multiple cherry angiomas on trunk.  Seborrheic keratosis " on right upper back.    Neurological:      General: No focal deficit present.      Mental Status: He is alert and oriented to person, place, and time.      Sensory: No sensory deficit.      Motor: No weakness.   Psychiatric:         Mood and Affect: Mood normal.         Behavior: Behavior normal.         Thought Content: Thought content normal.           Lab Results   Component Value Date    WBC 6.15 06/16/2021    HGB 13.5 06/16/2021    HCT 45.7 06/16/2021     06/16/2021    CHOL 156 06/16/2021    TRIG 98 06/16/2021    HDL 58 06/16/2021    ALT 25 06/16/2021    AST 9 (L) 06/16/2021     06/16/2021    K 4.2 06/16/2021     06/16/2021    CREATININE 1.06 06/16/2021    BUN 18 06/16/2021    CO2 32 06/16/2021    TSH 1.480 06/16/2021    INR 1.15 03/23/2020      Assessment:       1. Abnormal weight loss    2. Raised seborrheic keratosis        Plan:         Problem List Items Addressed This Visit        Endocrine    Abnormal weight loss - Primary     Instructed patient to start taking Boost or Ensure with meals.   Will check weight at f/u visit.   Discussed possibility of cancer and workup with CBC, CMP, TSH, UA, cxr with possible need for further imaging with CT chest/abd. He declined labs and imaging today. Patient states he will think about it and discuss again at f/u visit in 1 month.             Other    Raised seborrheic keratosis     Monitor for now.  Consider excision vs referral to dermatology if lesion changes or bleeds.                The total time spent for evaluation and management on 02/08/2022  including reviewing separately obtained history, performing a medically appropriate exam and evaluation, documenting clinical information in the health record, independently interpreting results and communicating them to the patient/family/caregiver, and ordering medications/tests/procedures was between 30-45 minutes.    Follow up in about 4 weeks (around 3/8/2022) for Unexpected weight loss..    Monty NEELY  MD [place]

## 2022-02-09 NOTE — ASSESSMENT & PLAN NOTE
Instructed patient to start taking Boost or Ensure with meals.   Will check weight at f/u visit.   Discussed possibility of cancer and workup with CBC, CMP, TSH, UA, cxr with possible need for further imaging with CT chest/abd. He declined labs and imaging today. Patient states he will think about it and discuss again at f/u visit in 1 month.

## 2022-02-10 ENCOUNTER — HOSPITAL ENCOUNTER (INPATIENT)
Facility: HOSPITAL | Age: 87
LOS: 6 days | Discharge: HOME | DRG: 178 | End: 2022-02-16
Attending: EMERGENCY MEDICINE | Admitting: HOSPITALIST
Payer: MEDICARE

## 2022-02-10 DIAGNOSIS — G89.29 CHRONIC PAIN OF LEFT ANKLE: Chronic | ICD-10-CM

## 2022-02-10 DIAGNOSIS — M47.817 LUMBOSACRAL SPONDYLOSIS WITHOUT MYELOPATHY: Chronic | ICD-10-CM

## 2022-02-10 DIAGNOSIS — E86.0 DEHYDRATION: Primary | ICD-10-CM

## 2022-02-10 DIAGNOSIS — H61.23 IMPACTED CERUMEN, BILATERAL: ICD-10-CM

## 2022-02-10 DIAGNOSIS — U07.1 COVID-19: ICD-10-CM

## 2022-02-10 DIAGNOSIS — R79.89 ELEVATED D-DIMER: ICD-10-CM

## 2022-02-10 DIAGNOSIS — M25.572 CHRONIC PAIN OF LEFT ANKLE: Chronic | ICD-10-CM

## 2022-02-10 DIAGNOSIS — I25.10 ARTERIOSCLEROSIS OF CORONARY ARTERY: ICD-10-CM

## 2022-02-10 DIAGNOSIS — G89.29 CHRONIC LOW BACK PAIN, UNSPECIFIED BACK PAIN LATERALITY, UNSPECIFIED WHETHER SCIATICA PRESENT: ICD-10-CM

## 2022-02-10 DIAGNOSIS — N17.9 ACUTE KIDNEY INJURY: ICD-10-CM

## 2022-02-10 DIAGNOSIS — M85.89 OSTEOPENIA OF MULTIPLE SITES: ICD-10-CM

## 2022-02-10 DIAGNOSIS — K59.03 DRUG-INDUCED CONSTIPATION: ICD-10-CM

## 2022-02-10 DIAGNOSIS — L82.1 RAISED SEBORRHEIC KERATOSIS: ICD-10-CM

## 2022-02-10 DIAGNOSIS — E55.9 VITAMIN D INSUFFICIENCY: ICD-10-CM

## 2022-02-10 DIAGNOSIS — I42.9 CARDIOMYOPATHY, UNSPECIFIED TYPE: ICD-10-CM

## 2022-02-10 DIAGNOSIS — N17.9 AKI (ACUTE KIDNEY INJURY): ICD-10-CM

## 2022-02-10 DIAGNOSIS — R63.4 ABNORMAL WEIGHT LOSS: ICD-10-CM

## 2022-02-10 DIAGNOSIS — E78.5 HYPERLIPIDEMIA, UNSPECIFIED HYPERLIPIDEMIA TYPE: ICD-10-CM

## 2022-02-10 DIAGNOSIS — Z87.19 HISTORY OF BILATERAL INGUINAL HERNIAS: ICD-10-CM

## 2022-02-10 DIAGNOSIS — J44.9 MODERATE CHRONIC OBSTRUCTIVE PULMONARY DISEASE: ICD-10-CM

## 2022-02-10 DIAGNOSIS — N39.0 URINARY TRACT INFECTION WITHOUT HEMATURIA, SITE UNSPECIFIED: ICD-10-CM

## 2022-02-10 DIAGNOSIS — I10 HYPERTENSION, UNSPECIFIED TYPE: ICD-10-CM

## 2022-02-10 DIAGNOSIS — M54.50 CHRONIC LOW BACK PAIN, UNSPECIFIED BACK PAIN LATERALITY, UNSPECIFIED WHETHER SCIATICA PRESENT: ICD-10-CM

## 2022-02-10 DIAGNOSIS — R06.02 SOB (SHORTNESS OF BREATH): ICD-10-CM

## 2022-02-10 DIAGNOSIS — R79.89 ELEVATED TROPONIN: ICD-10-CM

## 2022-02-10 DIAGNOSIS — R79.89 D-DIMER, ELEVATED: ICD-10-CM

## 2022-02-10 DIAGNOSIS — R06.02 SHORTNESS OF BREATH: ICD-10-CM

## 2022-02-10 PROBLEM — R19.03 ABDOMINAL MASS, RIGHT LOWER QUADRANT: Status: ACTIVE | Noted: 2022-02-10

## 2022-02-10 LAB
ALBUMIN SERPL BCP-MCNC: 2.6 G/DL (ref 3.5–5)
ALBUMIN/GLOB SERPL: 0.6 {RATIO}
ALP SERPL-CCNC: 84 U/L (ref 45–115)
ALT SERPL W P-5'-P-CCNC: 7 U/L (ref 16–61)
ANION GAP SERPL CALCULATED.3IONS-SCNC: 16 MMOL/L (ref 7–16)
AORTIC ROOT ANNULUS: 3.6 CM
AORTIC VALVE CUSP SEPERATION: 19.8 CM
AST SERPL W P-5'-P-CCNC: 9 U/L (ref 15–37)
AV INDEX (PROSTH): 0.77
AV MEAN GRADIENT: 3 MMHG
AV PEAK GRADIENT: 5 MMHG
AV REGURGITATION PRESSURE HALF TIME: 516 MS
AV VALVE AREA: 2.18 CM2
AV VELOCITY RATIO: 0.64
BACTERIA #/AREA URNS HPF: ABNORMAL /HPF
BASOPHILS # BLD AUTO: 0.03 K/UL (ref 0–0.2)
BASOPHILS NFR BLD AUTO: 0.2 % (ref 0–1)
BILIRUB SERPL-MCNC: 1.1 MG/DL (ref 0–1.2)
BILIRUB UR QL STRIP: NEGATIVE
BSA FOR ECHO PROCEDURE: 1.85 M2
BUN SERPL-MCNC: 76 MG/DL (ref 7–18)
BUN/CREAT SERPL: 22 (ref 6–20)
CALCIUM SERPL-MCNC: 9.2 MG/DL (ref 8.5–10.1)
CHLORIDE SERPL-SCNC: 99 MMOL/L (ref 98–107)
CLARITY UR: ABNORMAL
CO2 SERPL-SCNC: 25 MMOL/L (ref 21–32)
COLOR UR: ABNORMAL
CREAT SERPL-MCNC: 3.53 MG/DL (ref 0.7–1.3)
CV ECHO LV RWT: 0.59 CM
D DIMER PPP FEU-MCNC: 6.34 ΜG/ML (ref 0–0.47)
DIFFERENTIAL METHOD BLD: ABNORMAL
DOP CALC AO PEAK VEL: 1.1 M/S
DOP CALC AO VTI: 13 CM
DOP CALC LVOT AREA: 2.8 CM2
DOP CALC LVOT DIAMETER: 1.9 CM
DOP CALC LVOT PEAK VEL: 0.7 M/S
DOP CALC LVOT STROKE VOLUME: 28.34 CM3
DOP CALCLVOT PEAK VEL VTI: 10 CM
E WAVE DECELERATION TIME: 117 MSEC
ECHO EF ESTIMATED: 35 %
ECHO LV POSTERIOR WALL: 1.23 CM (ref 0.6–1.1)
EJECTION FRACTION: 25 %
EOSINOPHIL # BLD AUTO: 0 K/UL (ref 0–0.5)
EOSINOPHIL NFR BLD AUTO: 0 % (ref 1–4)
ERYTHROCYTE [DISTWIDTH] IN BLOOD BY AUTOMATED COUNT: 13.7 % (ref 11.5–14.5)
FLUAV AG UPPER RESP QL IA.RAPID: NEGATIVE
FLUBV AG UPPER RESP QL IA.RAPID: NEGATIVE
FRACTIONAL SHORTENING: 7 % (ref 28–44)
GLOBULIN SER-MCNC: 4.6 G/DL (ref 2–4)
GLUCOSE SERPL-MCNC: 129 MG/DL (ref 74–106)
GLUCOSE UR STRIP-MCNC: NEGATIVE MG/DL
HBV SURFACE AG SERPL QL IA: NORMAL
HCO3 UR-SCNC: 21.2 MMOL/L
HCO3 UR-SCNC: 21.3 MMOL/L (ref 21–28)
HCT VFR BLD AUTO: 38.3 % (ref 40–54)
HCT VFR BLD CALC: 43 % (ref 35–51)
HGB BLD-MCNC: 12.4 G/DL (ref 13.5–18)
IMM GRANULOCYTES # BLD AUTO: 0.14 K/UL (ref 0–0.04)
IMM GRANULOCYTES NFR BLD: 0.9 % (ref 0–0.4)
INTERVENTRICULAR SEPTUM: 1.24 CM (ref 0.6–1.1)
IVC OSTIUM: 1.1 CM
KETONES UR STRIP-SCNC: ABNORMAL MG/DL
LDH SERPL L TO P-CCNC: 2.6 MMOL/L (ref 0.3–1.2)
LEFT ATRIUM SIZE: 2.5 CM
LEFT INTERNAL DIMENSION IN SYSTOLE: 3.9 CM (ref 2.1–4)
LEFT VENTRICLE DIASTOLIC VOLUME INDEX: 41.55 ML/M2
LEFT VENTRICLE DIASTOLIC VOLUME: 77.7 ML
LEFT VENTRICLE MASS INDEX: 99 G/M2
LEFT VENTRICLE SYSTOLIC VOLUME INDEX: 35.2 ML/M2
LEFT VENTRICLE SYSTOLIC VOLUME: 65.9 ML
LEFT VENTRICULAR INTERNAL DIMENSION IN DIASTOLE: 4.18 CM (ref 3.5–6)
LEFT VENTRICULAR MASS: 184.51 G
LEUKOCYTE ESTERASE UR QL STRIP: ABNORMAL
LVOT MG: 1 MMHG
LYMPHOCYTES # BLD AUTO: 0.33 K/UL (ref 1–4.8)
LYMPHOCYTES NFR BLD AUTO: 2.1 % (ref 27–41)
LYMPHOCYTES NFR BLD MANUAL: 2 % (ref 27–41)
MCH RBC QN AUTO: 29.9 PG (ref 27–31)
MCHC RBC AUTO-ENTMCNC: 32.4 G/DL (ref 32–36)
MCV RBC AUTO: 92.3 FL (ref 80–96)
MONOCYTES # BLD AUTO: 1.41 K/UL (ref 0–0.8)
MONOCYTES NFR BLD AUTO: 8.8 % (ref 2–6)
MONOCYTES NFR BLD MANUAL: 9 % (ref 2–6)
MPC BLD CALC-MCNC: 10.5 FL (ref 9.4–12.4)
MV PEAK E VEL: 0.73 M/S
NEUTROPHILS # BLD AUTO: 14.13 K/UL (ref 1.8–7.7)
NEUTROPHILS NFR BLD AUTO: 88 % (ref 53–65)
NEUTS SEG NFR BLD MANUAL: 89 % (ref 50–62)
NITRITE UR QL STRIP: POSITIVE
NRBC # BLD AUTO: 0 X10E3/UL
NRBC, AUTO (.00): 0 %
NT-PROBNP SERPL-MCNC: ABNORMAL PG/ML (ref 1–450)
PCO2 BLDA: 30 MMHG (ref 35–48)
PCO2 BLDA: 32 MMHG (ref 35–48)
PH SMN: 7.43 [PH]
PH SMN: 7.46 [PH] (ref 7.35–7.45)
PH UR STRIP: 5.5 PH UNITS
PISA AR MAX VEL: 2.55 M/S
PISA TR MAX VEL: 2.1 M/S
PLATELET # BLD AUTO: 218 K/UL (ref 150–400)
PLATELET MORPHOLOGY: NORMAL
PO2 BLDA: 85 MMHG (ref 83–108)
PO2 BLDA: 95 MMHG
POC A-ADO2: 65 MMHG
POC BASE EXCESS: -1.5 MMOL/L (ref -2–3)
POC BASE EXCESS: -2.3 MMOL/L (ref -2–3)
POC CO2: 22.2 MMOL/L
POC IONIZED CALCIUM: 1.15 MMOL/L (ref 1.15–1.35)
POC SATURATED O2: 97 % (ref 95–98)
POC SATURATED O2: 97.8 %
POCT GLUCOSE: 111 MG/DL (ref 60–95)
POTASSIUM BLD-SCNC: 3.4 MMOL/L (ref 3.4–4.5)
POTASSIUM SERPL-SCNC: 3.9 MMOL/L (ref 3.5–5.1)
PROT SERPL-MCNC: 7.2 G/DL (ref 6.4–8.2)
PROT UR QL STRIP: 30
RA MAJOR: 3.3 CM
RA PRESSURE: 3 MMHG
RBC # BLD AUTO: 4.15 M/UL (ref 4.6–6.2)
RBC # UR STRIP: ABNORMAL /UL
RBC #/AREA URNS HPF: ABNORMAL /HPF
RBC MORPH BLD: NORMAL
RIGHT VENTRICULAR END-DIASTOLIC DIMENSION: 3.7 CM
SARS-COV+SARS-COV-2 AG RESP QL IA.RAPID: POSITIVE
SODIUM BLD-SCNC: 134 MMOL/L (ref 136–145)
SODIUM SERPL-SCNC: 136 MMOL/L (ref 136–145)
SP GR UR STRIP: 1.02
SQUAMOUS #/AREA URNS LPF: ABNORMAL /LPF
TR MAX PG: 18 MMHG
TRICUSPID ANNULAR PLANE SYSTOLIC EXCURSION: 1.6 CM
TROPONIN I SERPL HS-MCNC: 153.2 PG/ML
TROPONIN I SERPL HS-MCNC: 83.5 PG/ML
TV REST PULMONARY ARTERY PRESSURE: 21 MMHG
UROBILINOGEN UR STRIP-ACNC: 0.2 MG/DL
WBC # BLD AUTO: 16.04 K/UL (ref 4.5–11)
WBC #/AREA URNS HPF: ABNORMAL /HPF
YEAST #/AREA URNS HPF: ABNORMAL /HPF

## 2022-02-10 PROCEDURE — 93010 EKG 12-LEAD: ICD-10-PCS | Mod: 77,,, | Performed by: INTERNAL MEDICINE

## 2022-02-10 PROCEDURE — 93010 EKG 12-LEAD: ICD-10-PCS | Mod: ,,, | Performed by: HOSPITALIST

## 2022-02-10 PROCEDURE — 86481 TB AG RESPONSE T-CELL SUSP: CPT | Performed by: NURSE PRACTITIONER

## 2022-02-10 PROCEDURE — 85378 FIBRIN DEGRADE SEMIQUANT: CPT | Performed by: EMERGENCY MEDICINE

## 2022-02-10 PROCEDURE — 96361 HYDRATE IV INFUSION ADD-ON: CPT

## 2022-02-10 PROCEDURE — 82803 BLOOD GASES ANY COMBINATION: CPT

## 2022-02-10 PROCEDURE — 81001 URINALYSIS AUTO W/SCOPE: CPT | Performed by: EMERGENCY MEDICINE

## 2022-02-10 PROCEDURE — 25000003 PHARM REV CODE 250: Performed by: EMERGENCY MEDICINE

## 2022-02-10 PROCEDURE — 93010 ELECTROCARDIOGRAM REPORT: CPT | Mod: ,,, | Performed by: HOSPITALIST

## 2022-02-10 PROCEDURE — 99285 EMERGENCY DEPT VISIT HI MDM: CPT | Mod: 25

## 2022-02-10 PROCEDURE — 87340 HEPATITIS B SURFACE AG IA: CPT | Performed by: NURSE PRACTITIONER

## 2022-02-10 PROCEDURE — 99284 EMERGENCY DEPT VISIT MOD MDM: CPT | Mod: ,,, | Performed by: EMERGENCY MEDICINE

## 2022-02-10 PROCEDURE — 11000001 HC ACUTE MED/SURG PRIVATE ROOM

## 2022-02-10 PROCEDURE — 93005 ELECTROCARDIOGRAM TRACING: CPT

## 2022-02-10 PROCEDURE — 99223 1ST HOSP IP/OBS HIGH 75: CPT | Mod: $0,,, | Performed by: HOSPITALIST

## 2022-02-10 PROCEDURE — 36415 COLL VENOUS BLD VENIPUNCTURE: CPT | Performed by: EMERGENCY MEDICINE

## 2022-02-10 PROCEDURE — 87086 URINE CULTURE/COLONY COUNT: CPT | Performed by: EMERGENCY MEDICINE

## 2022-02-10 PROCEDURE — 83880 ASSAY OF NATRIURETIC PEPTIDE: CPT | Performed by: NURSE PRACTITIONER

## 2022-02-10 PROCEDURE — 84295 ASSAY OF SERUM SODIUM: CPT

## 2022-02-10 PROCEDURE — 86704 HEP B CORE ANTIBODY TOTAL: CPT | Mod: 90 | Performed by: NURSE PRACTITIONER

## 2022-02-10 PROCEDURE — 87077 CULTURE AEROBIC IDENTIFY: CPT | Performed by: EMERGENCY MEDICINE

## 2022-02-10 PROCEDURE — 36600 WITHDRAWAL OF ARTERIAL BLOOD: CPT

## 2022-02-10 PROCEDURE — 25000003 PHARM REV CODE 250: Performed by: NURSE PRACTITIONER

## 2022-02-10 PROCEDURE — 63600175 PHARM REV CODE 636 W HCPCS: Performed by: NURSE PRACTITIONER

## 2022-02-10 PROCEDURE — 36415 COLL VENOUS BLD VENIPUNCTURE: CPT | Performed by: NURSE PRACTITIONER

## 2022-02-10 PROCEDURE — 99284 PR EMERGENCY DEPT VISIT,LEVEL IV: ICD-10-PCS | Mod: ,,, | Performed by: EMERGENCY MEDICINE

## 2022-02-10 PROCEDURE — 83605 ASSAY OF LACTIC ACID: CPT

## 2022-02-10 PROCEDURE — 96365 THER/PROPH/DIAG IV INF INIT: CPT

## 2022-02-10 PROCEDURE — 99223 PR INITIAL HOSPITAL CARE,LEVL III: ICD-10-PCS | Mod: $0,,, | Performed by: HOSPITALIST

## 2022-02-10 PROCEDURE — 85014 HEMATOCRIT: CPT

## 2022-02-10 PROCEDURE — 84484 ASSAY OF TROPONIN QUANT: CPT | Performed by: NURSE PRACTITIONER

## 2022-02-10 PROCEDURE — 93010 ELECTROCARDIOGRAM REPORT: CPT | Mod: 77,,, | Performed by: INTERNAL MEDICINE

## 2022-02-10 PROCEDURE — 84132 ASSAY OF SERUM POTASSIUM: CPT

## 2022-02-10 PROCEDURE — 63600175 PHARM REV CODE 636 W HCPCS: Performed by: EMERGENCY MEDICINE

## 2022-02-10 PROCEDURE — 87428 SARSCOV & INF VIR A&B AG IA: CPT | Performed by: EMERGENCY MEDICINE

## 2022-02-10 PROCEDURE — 99900035 HC TECH TIME PER 15 MIN (STAT)

## 2022-02-10 PROCEDURE — 82947 ASSAY GLUCOSE BLOOD QUANT: CPT

## 2022-02-10 PROCEDURE — 82330 ASSAY OF CALCIUM: CPT

## 2022-02-10 PROCEDURE — 80053 COMPREHEN METABOLIC PANEL: CPT | Performed by: EMERGENCY MEDICINE

## 2022-02-10 PROCEDURE — C9399 UNCLASSIFIED DRUGS OR BIOLOG: HCPCS | Performed by: NURSE PRACTITIONER

## 2022-02-10 PROCEDURE — 84443 ASSAY THYROID STIM HORMONE: CPT | Performed by: EMERGENCY MEDICINE

## 2022-02-10 PROCEDURE — 85025 COMPLETE CBC W/AUTO DIFF WBC: CPT | Performed by: EMERGENCY MEDICINE

## 2022-02-10 RX ORDER — HEPARIN SODIUM 5000 [USP'U]/ML
5000 INJECTION, SOLUTION INTRAVENOUS; SUBCUTANEOUS EVERY 8 HOURS
Status: DISCONTINUED | OUTPATIENT
Start: 2022-02-10 | End: 2022-02-16 | Stop reason: HOSPADM

## 2022-02-10 RX ORDER — ATORVASTATIN CALCIUM 40 MG/1
40 TABLET, FILM COATED ORAL NIGHTLY
Status: DISCONTINUED | OUTPATIENT
Start: 2022-02-10 | End: 2022-02-16 | Stop reason: HOSPADM

## 2022-02-10 RX ORDER — HYDROCODONE BITARTRATE AND ACETAMINOPHEN 7.5; 325 MG/1; MG/1
1 TABLET ORAL EVERY 6 HOURS PRN
Status: DISCONTINUED | OUTPATIENT
Start: 2022-02-10 | End: 2022-02-14

## 2022-02-10 RX ORDER — ASPIRIN 81 MG/1
81 TABLET ORAL DAILY
Status: DISCONTINUED | OUTPATIENT
Start: 2022-02-10 | End: 2022-02-16 | Stop reason: HOSPADM

## 2022-02-10 RX ORDER — SODIUM CHLORIDE 9 MG/ML
INJECTION, SOLUTION INTRAVENOUS CONTINUOUS
Status: DISCONTINUED | OUTPATIENT
Start: 2022-02-10 | End: 2022-02-11

## 2022-02-10 RX ORDER — CARVEDILOL 6.25 MG/1
6.25 TABLET ORAL 2 TIMES DAILY WITH MEALS
Status: DISCONTINUED | OUTPATIENT
Start: 2022-02-10 | End: 2022-02-16 | Stop reason: HOSPADM

## 2022-02-10 RX ORDER — CALCIUM CARB/VITAMIN D3/VIT K1 500-500-40
800 TABLET,CHEWABLE ORAL DAILY
Status: DISCONTINUED | OUTPATIENT
Start: 2022-02-10 | End: 2022-02-16 | Stop reason: HOSPADM

## 2022-02-10 RX ORDER — TAMSULOSIN HYDROCHLORIDE 0.4 MG/1
0.4 CAPSULE ORAL DAILY
Status: DISCONTINUED | OUTPATIENT
Start: 2022-02-10 | End: 2022-02-16 | Stop reason: HOSPADM

## 2022-02-10 RX ORDER — DEXAMETHASONE 4 MG/1
4 TABLET ORAL DAILY
Status: DISCONTINUED | OUTPATIENT
Start: 2022-02-10 | End: 2022-02-10

## 2022-02-10 RX ADMIN — TAMSULOSIN HYDROCHLORIDE 0.4 MG: 0.4 CAPSULE ORAL at 05:02

## 2022-02-10 RX ADMIN — BARICITINIB 1 MG: 1 TABLET, FILM COATED ORAL at 02:02

## 2022-02-10 RX ADMIN — HYDROCODONE BITARTRATE AND ACETAMINOPHEN 1 TABLET: 7.5; 325 TABLET ORAL at 09:02

## 2022-02-10 RX ADMIN — CEFTRIAXONE SODIUM 1 G: 1 INJECTION, POWDER, FOR SOLUTION INTRAMUSCULAR; INTRAVENOUS at 08:02

## 2022-02-10 RX ADMIN — SODIUM CHLORIDE 500 ML: 9 INJECTION, SOLUTION INTRAVENOUS at 08:02

## 2022-02-10 RX ADMIN — ATORVASTATIN CALCIUM 40 MG: 40 TABLET, FILM COATED ORAL at 09:02

## 2022-02-10 RX ADMIN — HEPARIN SODIUM 5000 UNITS: 5000 INJECTION INTRAVENOUS; SUBCUTANEOUS at 09:02

## 2022-02-10 RX ADMIN — DEXAMETHASONE 4 MG: 4 TABLET ORAL at 02:02

## 2022-02-10 RX ADMIN — HYDROCODONE BITARTRATE AND ACETAMINOPHEN 1 TABLET: 7.5; 325 TABLET ORAL at 10:02

## 2022-02-10 RX ADMIN — SODIUM CHLORIDE: 9 INJECTION, SOLUTION INTRAVENOUS at 02:02

## 2022-02-10 RX ADMIN — CHOLECALCIFEROL TAB 10 MCG (400 UNIT) 800 UNITS: 10 TAB at 02:02

## 2022-02-10 RX ADMIN — ASPIRIN 81 MG: 81 TABLET, COATED ORAL at 02:02

## 2022-02-10 RX ADMIN — CARVEDILOL 6.25 MG: 6.25 TABLET, FILM COATED ORAL at 05:02

## 2022-02-10 NOTE — SUBJECTIVE & OBJECTIVE
Past Medical History:   Diagnosis Date    Aortic regurgitation     Back pain     CAD (coronary artery disease)     Chronic back pain     Constipation     COPD (chronic obstructive pulmonary disease)     Generalized OA     Hyperlipidemia     Hypertension     Osteopenia determined by x-ray     Pulmonary stenosis        Past Surgical History:   Procedure Laterality Date    CORONARY ANGIOPLASTY WITH STENT PLACEMENT      FOOT SURGERY  2021    Dr Jackson    HIP SURGERY      LITHOTRIPSY         Review of patient's allergies indicates:  No Known Allergies    No current facility-administered medications on file prior to encounter.     Current Outpatient Medications on File Prior to Encounter   Medication Sig    acetaminophen (TYLENOL) 325 MG tablet Take 325 mg by mouth every 6 (six) hours as needed for Pain.    aspirin (ECOTRIN) 81 MG EC tablet Take 1 tablet (81 mg total) by mouth once daily.    carvediloL (COREG) 6.25 MG tablet Take 6.25 mg by mouth once daily.     cholecalciferol, vitamin D3, 10 mcg (400 unit) Cap Take 2 capsules (800 Units total) by mouth once daily.    HYDROcodone-acetaminophen (NORCO)  mg per tablet Take 1 tablet by mouth every 8 (eight) hours.    [START ON 3/7/2022] HYDROcodone-acetaminophen (NORCO)  mg per tablet Take 1 tablet by mouth every 8 (eight) hours.    [START ON 4/6/2022] HYDROcodone-acetaminophen (NORCO)  mg per tablet Take 1 tablet by mouth every 8 (eight) hours.    losartan (COZAAR) 50 MG tablet Take 1 tablet (50 mg total) by mouth once daily.    NIFEdipine (ADALAT CC) 30 MG TbSR Take 30 mg by mouth once daily.    simvastatin (ZOCOR) 80 MG tablet Take 80 mg by mouth every evening. Take 1/2 tablet once at night.    traZODone (DESYREL) 50 MG tablet Take 1 tablet (50 mg total) by mouth every evening.     Family History     Problem Relation (Age of Onset)    Cancer Mother, Sister, Brother        Tobacco Use    Smoking status: Former Smoker     Smokeless tobacco: Never Used   Substance and Sexual Activity    Alcohol use: Never    Drug use: Never    Sexual activity: Not on file     Review of Systems   Constitutional: Positive for activity change, appetite change and fatigue.   HENT: Negative for sinus pain.    Eyes: Negative for discharge and itching.   Respiratory: Positive for cough and shortness of breath.    Cardiovascular: Negative for chest pain and palpitations.   Gastrointestinal: Negative for constipation, nausea and vomiting.   Endocrine: Negative for cold intolerance and heat intolerance.   Genitourinary: Negative for difficulty urinating, scrotal swelling and urgency.   Musculoskeletal: Positive for arthralgias and back pain.   Skin: Negative for color change and wound.   Allergic/Immunologic: Negative for environmental allergies and food allergies.   Neurological: Negative for dizziness and weakness.   Hematological: Negative for adenopathy. Does not bruise/bleed easily.   Psychiatric/Behavioral: Negative for agitation and confusion.   All other systems reviewed and are negative.    Objective:     Vital Signs (Most Recent):  Temp: 99 °F (37.2 °C) (02/10/22 1339)  Pulse: 89 (02/10/22 1339)  Resp: (!) 28 (02/10/22 1339)  BP: 120/65 (02/10/22 1339)  SpO2: (!) 94 % (02/10/22 1339) Vital Signs (24h Range):  Temp:  [99 °F (37.2 °C)] 99 °F (37.2 °C)  Pulse:  [] 89  Resp:  [14-40] 28  SpO2:  [82 %-95 %] 94 %  BP: ()/() 120/65     Weight: 68.1 kg (150 lb 2.1 oz)  Body mass index is 20.94 kg/m².    Physical Exam  Vitals and nursing note reviewed.   Constitutional:       Appearance: He is ill-appearing.   HENT:      Head: Normocephalic and atraumatic.      Nose: No congestion or rhinorrhea.      Mouth/Throat:      Mouth: Mucous membranes are dry.   Eyes:      Extraocular Movements: Extraocular movements intact.      Pupils: Pupils are equal, round, and reactive to light.   Cardiovascular:      Rate and Rhythm: Tachycardia present.       Pulses: Normal pulses.      Heart sounds: No murmur heard.      Pulmonary:      Effort: Pulmonary effort is normal.      Breath sounds: No wheezing or rhonchi.   Abdominal:      General: There is no distension.      Tenderness: There is no abdominal tenderness.      Hernia: A hernia is present.       Musculoskeletal:      Cervical back: Normal range of motion and neck supple.      Right lower leg: No edema.      Left lower leg: No edema.   Skin:     General: Skin is warm and dry.      Capillary Refill: Capillary refill takes 2 to 3 seconds.   Neurological:      General: No focal deficit present.      Mental Status: He is alert and oriented to person, place, and time.   Psychiatric:         Mood and Affect: Mood normal.         Behavior: Behavior normal.           CRANIAL NERVES     CN III, IV, VI   Pupils are equal, round, and reactive to light.       Significant Labs:   All pertinent labs within the past 24 hours have been reviewed.  Recent Lab Results       02/10/22  1126   02/10/22  0707   02/10/22  0615   02/10/22  0614        POC CO2 22.2             Influenza B, Molecular     Negative         Albumin/Globulin Ratio       0.6       Albumin       2.6       Alkaline Phosphatase       84       ALT       7       Anion Gap       16       Appearance, UA   Cloudy           AST       9       Bacteria, UA   Many           Baso #       0.03       Basophil %       0.2       Bilirubin (UA)   Negative           BILIRUBIN TOTAL       1.1       BUN       76       BUN/CREAT RATIO       22       Calcium       9.2       Chloride       99       CO2       25       Color, UA   Straw           COVID-19 Ag     Positive         Creatinine       3.53       D-Dimer       6.34       Differential Type       Manual       eGFR if non        17       Eos #       0.00       Eosinophil %       0.0       Globulin, Total       4.6       Glucose       129       Glucose, UA   Negative           Hematocrit       38.3        Hemoglobin       12.4       Immature Grans (Abs)       0.14       Immature Granulocytes       0.9       Influenza A     Negative         Ketones, UA   Trace           Leukocytes, UA   Moderate           Lymph #       0.33       Lymph %       2.1              2       MCH       29.9       MCHC       32.4       MCV       92.3       Mono #       1.41       Mono %       8.8              9       MPV       10.5       Neutrophils, Abs       14.13       Neutrophils Relative       88.0       NITRITE UA   Positive           nRBC       0.0       NUCLEATED RBC ABSOLUTE       0.00       Occult Blood UA   Large           pH, UA   5.5           PLATELET MORPHOLOGY       Normal       Platelets       218       POC Base Excess -1.5             POC HCO3 21.3             POC Hematocrit 43             POC Ionized Calcium 1.15             POC Lactate 2.6             POC PCO2 30             POC PH 7.46             POC PO2 85             POC Potassium 3.4             POC SATURATED O2 97             POC Sodium 134             POCT Glucose 111             Potassium       3.9       PROTEIN TOTAL       7.2       Protein, UA   30            RBC       4.15       RBC Morphology       Normal       RBC, UA   Too Numerous To Count           RDW       13.7       Segmented Neutrophils, Man %       89       Sodium       136       Specific [place], UA   1.025           Squam Epithel, UA   None Seen           UROBILINOGEN UA   0.2           WBC, UA   11-15           WBC       16.04       Yeast, UA   None Seen                 Significant Imaging: I have reviewed all pertinent imaging results/findings within the past 24 hours.  CXR: I have reviewed all pertinent results/findings within the past 24 hours and my personal findings are:  No acute CP processes  EKG: I have reviewed all pertinent results/findings within the past 24 hours and my personal findings are: ST; leftward axis; right BBB  U/S: I have reviewed all pertinent results/findings within the past  24 hours and my personal findings are:  Negative BLE US for DVT

## 2022-02-10 NOTE — PLAN OF CARE
Problem: Adult Inpatient Plan of Care  Goal: Plan of Care Review  Outcome: Ongoing, Progressing  Flowsheets (Taken 2/10/2022 1342)  Plan of Care Reviewed With: patient  Goal: Patient-Specific Goal (Individualized)  Outcome: Ongoing, Progressing  Goal: Absence of Hospital-Acquired Illness or Injury  Outcome: Ongoing, Progressing  Intervention: Identify and Manage Fall Risk  Flowsheets (Taken 2/10/2022 1342)  Safety Promotion/Fall Prevention:   assistive device/personal item within reach   bed alarm set   nonskid shoes/socks when out of bed  Intervention: Prevent Skin Injury  Flowsheets (Taken 2/10/2022 1342)  Body Position: 30 degrees  Skin Protection: incontinence pads utilized  Intervention: Prevent and Manage VTE (Venous Thromboembolism) Risk  Flowsheets (Taken 2/10/2022 1342)  Activity Management: Ambulated -L4  VTE Prevention/Management: ambulation promoted  Range of Motion: active ROM (range of motion) encouraged  Goal: Optimal Comfort and Wellbeing  Outcome: Ongoing, Progressing  Goal: Readiness for Transition of Care  Outcome: Ongoing, Progressing     Problem: Fall Injury Risk  Goal: Absence of Fall and Fall-Related Injury  Outcome: Ongoing, Progressing  Intervention: Identify and Manage Contributors  Flowsheets (Taken 2/10/2022 1342)  Self-Care Promotion:   meal set-up provided   BADL personal objects within reach   BADL personal routines maintained   safe use of adaptive equipment encouraged  Medication Review/Management: medications reviewed  Intervention: Promote Injury-Free Environment  Flowsheets (Taken 2/10/2022 1342)  Safety Promotion/Fall Prevention:   assistive device/personal item within reach   bed alarm set   nonskid shoes/socks when out of bed

## 2022-02-10 NOTE — HPI
Mr. Lares is an 89 year old WM who presents to ED with complaint of generalized weakness and shortness of breath. He states he took his daughter to therapy appointment and shortly afterwards his daughter got sick. He states she was sick approximately 4-5 days and within the week he was sick. He states he has had an intermittent cough, shortness of breath, and weakness. He also endorses variable appetite for the last 3 days. Workup revealed he is Covid positive, dehydrated, and experiencing acute kidney injury.     Pertinent medical history of chronic back pain, constipation, hypertension, osteopenia, CAD, aortic regurgitation, pulmonary stenosis, COPD, HLD, vitamin D deficiency.

## 2022-02-10 NOTE — ASSESSMENT & PLAN NOTE
Acute, variable  D-dimer 6.34  BLE dopplers negative  Pending VQ lung scan  Heparin 5000u SQ Q8h for now   Monitor

## 2022-02-10 NOTE — H&P
[address] Medicine  History & Physical    Patient Name: Coleman Lares  MRN: [id number]  Patient Class: IP- Inpatient  Admission Date: 2/10/2022  Attending Physician: Chen Geiger MD   Primary Care Provider: Salinas Bird DO         Patient information was obtained from patient, past medical records and ER records.     Subjective:     Principal Problem:COVID-19    Chief Complaint:   Chief Complaint   Patient presents with    Weakness    Shortness of Breath     Pt c/o weakness, SOB, and loss of appetite for approx 1 week.        HPI: Mr. Lares is an 89 year old WM who presents to ED with complaint of generalized weakness and shortness of breath. He states he took his daughter to therapy appointment and shortly afterwards his daughter got sick. He states she was sick approximately 4-5 days and within the week he was sick. He states he has had an intermittent cough, shortness of breath, and weakness. He also endorses variable appetite for the last 3 days. Workup revealed he is Covid positive, dehydrated, and experiencing acute kidney injury.     Pertinent medical history of chronic back pain, constipation, hypertension, osteopenia, CAD, aortic regurgitation, pulmonary stenosis, COPD, HLD, vitamin D deficiency.       Past Medical History:   Diagnosis Date    Aortic regurgitation     Back pain     CAD (coronary artery disease)     Chronic back pain     Constipation     COPD (chronic obstructive pulmonary disease)     Generalized OA     Hyperlipidemia     Hypertension     Osteopenia determined by x-ray     Pulmonary stenosis        Past Surgical History:   Procedure Laterality Date    CORONARY ANGIOPLASTY WITH STENT PLACEMENT      FOOT SURGERY  2021    Dr Jackson    HIP SURGERY      LITHOTRIPSY         Review of patient's allergies indicates:  No Known Allergies    No current facility-administered medications on file prior to encounter.     Current  Outpatient Medications on File Prior to Encounter   Medication Sig    acetaminophen (TYLENOL) 325 MG tablet Take 325 mg by mouth every 6 (six) hours as needed for Pain.    aspirin (ECOTRIN) 81 MG EC tablet Take 1 tablet (81 mg total) by mouth once daily.    carvediloL (COREG) 6.25 MG tablet Take 6.25 mg by mouth once daily.     cholecalciferol, vitamin D3, 10 mcg (400 unit) Cap Take 2 capsules (800 Units total) by mouth once daily.    HYDROcodone-acetaminophen (NORCO)  mg per tablet Take 1 tablet by mouth every 8 (eight) hours.    [START ON 3/7/2022] HYDROcodone-acetaminophen (NORCO)  mg per tablet Take 1 tablet by mouth every 8 (eight) hours.    [START ON 4/6/2022] HYDROcodone-acetaminophen (NORCO)  mg per tablet Take 1 tablet by mouth every 8 (eight) hours.    losartan (COZAAR) 50 MG tablet Take 1 tablet (50 mg total) by mouth once daily.    NIFEdipine (ADALAT CC) 30 MG TbSR Take 30 mg by mouth once daily.    simvastatin (ZOCOR) 80 MG tablet Take 80 mg by mouth every evening. Take 1/2 tablet once at night.    traZODone (DESYREL) 50 MG tablet Take 1 tablet (50 mg total) by mouth every evening.     Family History     Problem Relation (Age of Onset)    Cancer Mother, Sister, Brother        Tobacco Use    Smoking status: Former Smoker    Smokeless tobacco: Never Used   Substance and Sexual Activity    Alcohol use: Never    Drug use: Never    Sexual activity: Not on file     Review of Systems   Constitutional: Positive for activity change, appetite change and fatigue.   HENT: Negative for sinus pain.    Eyes: Negative for discharge and itching.   Respiratory: Positive for cough and shortness of breath.    Cardiovascular: Negative for chest pain and palpitations.   Gastrointestinal: Negative for constipation, nausea and vomiting.   Endocrine: Negative for cold intolerance and heat intolerance.   Genitourinary: Negative for difficulty urinating, scrotal swelling and urgency.    Musculoskeletal: Positive for arthralgias and back pain.   Skin: Negative for color change and wound.   Allergic/Immunologic: Negative for environmental allergies and food allergies.   Neurological: Negative for dizziness and weakness.   Hematological: Negative for adenopathy. Does not bruise/bleed easily.   Psychiatric/Behavioral: Negative for agitation and confusion.   All other systems reviewed and are negative.    Objective:     Vital Signs (Most Recent):  Temp: 99 °F (37.2 °C) (02/10/22 1339)  Pulse: 89 (02/10/22 1339)  Resp: (!) 28 (02/10/22 1339)  BP: 120/65 (02/10/22 1339)  SpO2: (!) 94 % (02/10/22 1339) Vital Signs (24h Range):  Temp:  [99 °F (37.2 °C)] 99 °F (37.2 °C)  Pulse:  [] 89  Resp:  [14-40] 28  SpO2:  [82 %-95 %] 94 %  BP: ()/() 120/65     Weight: 68.1 kg (150 lb 2.1 oz)  Body mass index is 20.94 kg/m².    Physical Exam  Vitals and nursing note reviewed.   Constitutional:       Appearance: He is ill-appearing.   HENT:      Head: Normocephalic and atraumatic.      Nose: No congestion or rhinorrhea.      Mouth/Throat:      Mouth: Mucous membranes are dry.   Eyes:      Extraocular Movements: Extraocular movements intact.      Pupils: Pupils are equal, round, and reactive to light.   Cardiovascular:      Rate and Rhythm: Tachycardia present.      Pulses: Normal pulses.      Heart sounds: No murmur heard.      Pulmonary:      Effort: Pulmonary effort is normal.      Breath sounds: No wheezing or rhonchi.   Abdominal:      General: There is no distension.      Tenderness: There is no abdominal tenderness.      Hernia: A hernia is present.       Musculoskeletal:      Cervical back: Normal range of motion and neck supple.      Right lower leg: No edema.      Left lower leg: No edema.   Skin:     General: Skin is warm and dry.      Capillary Refill: Capillary refill takes 2 to 3 seconds.   Neurological:      General: No focal deficit present.      Mental Status: He is alert and oriented  to person, place, and time.   Psychiatric:         Mood and Affect: Mood normal.         Behavior: Behavior normal.           CRANIAL NERVES     CN III, IV, VI   Pupils are equal, round, and reactive to light.       Significant Labs:   All pertinent labs within the past 24 hours have been reviewed.  Recent Lab Results       02/10/22  1126   02/10/22  0707   02/10/22  0615   02/10/22  0614        POC CO2 22.2             Influenza B, Molecular     Negative         Albumin/Globulin Ratio       0.6       Albumin       2.6       Alkaline Phosphatase       84       ALT       7       Anion Gap       16       Appearance, UA   Cloudy           AST       9       Bacteria, UA   Many           Baso #       0.03       Basophil %       0.2       Bilirubin (UA)   Negative           BILIRUBIN TOTAL       1.1       BUN       76       BUN/CREAT RATIO       22       Calcium       9.2       Chloride       99       CO2       25       Color, UA   Straw           COVID-19 Ag     Positive         Creatinine       3.53       D-Dimer       6.34       Differential Type       Manual       eGFR if non        17       Eos #       0.00       Eosinophil %       0.0       Globulin, Total       4.6       Glucose       129       Glucose, UA   Negative           Hematocrit       38.3       Hemoglobin       12.4       Immature Grans (Abs)       0.14       Immature Granulocytes       0.9       Influenza A     Negative         Ketones, UA   Trace           Leukocytes, UA   Moderate           Lymph #       0.33       Lymph %       2.1              2       MCH       29.9       MCHC       32.4       MCV       92.3       Mono #       1.41       Mono %       8.8              9       MPV       10.5       Neutrophils, Abs       14.13       Neutrophils Relative       88.0       NITRITE UA   Positive           nRBC       0.0       NUCLEATED RBC ABSOLUTE       0.00       Occult Blood UA   Large           pH, UA   5.5           PLATELET MORPHOLOGY        Normal       Platelets       218       POC Base Excess -1.5             POC HCO3 21.3             POC Hematocrit 43             POC Ionized Calcium 1.15             POC Lactate 2.6             POC PCO2 30             POC PH 7.46             POC PO2 85             POC Potassium 3.4             POC SATURATED O2 97             POC Sodium 134             POCT Glucose 111             Potassium       3.9       PROTEIN TOTAL       7.2       Protein, UA   30            RBC       4.15       RBC Morphology       Normal       RBC, UA   Too Numerous To Count           RDW       13.7       Segmented Neutrophils, Man %       89       Sodium       136       Specific [place], UA   1.025           Squam Epithel, UA   None Seen           UROBILINOGEN UA   0.2           WBC, UA   11-15           WBC       16.04       Yeast, UA   None Seen                 Significant Imaging: I have reviewed all pertinent imaging results/findings within the past 24 hours.  CXR: I have reviewed all pertinent results/findings within the past 24 hours and my personal findings are:  No acute CP processes  EKG: I have reviewed all pertinent results/findings within the past 24 hours and my personal findings are: ST; leftward axis; right BBB  U/S: I have reviewed all pertinent results/findings within the past 24 hours and my personal findings are:  Negative BLE US for DVT    Assessment/Plan:     * COVID-19  Acute, variable  Symptomatic with shortness of breath and weakness  Covid positive on PCR  RA oxygen saturations 88%; 92-94% on 2L   ABGs unremarkable  Unable to receive Remdesivir due to kidney function  Baricitinib 1mg PO QD initiated   Decadron 4mg PO QD  Monitor in isolation      Shortness of breath  Subacute, variable  Oxygen sat room air 88%  Covid 19 positive  D-dimer 6.34; negative LE dopplers  History of cardiac stent 20+ years ago  ECHO, VQ lung scan pending   Check troponin, BNP    Elevated d-dimer  Acute, variable  D-dimer 6.34  BLE  dopplers negative  Pending VQ lung scan  Heparin 5000u SQ Q8h for now   Monitor       Abdominal mass, right lower quadrant  Acute, variable  Palpable, non-reducible mass to RLQ suspicious for inguinal hernia  CT abdomen/pelvis w/o pending  Therapy pending diagnostics      Dehydration  Acute, variable  IVF bolus NS 500cc  IVF maintenance at 75ml/hr  Monitor BMP      EMETERIO (acute kidney injury)  Patient with acute kidney injury likely d/t IVVD/Dehydration Which is currently being treated. Labs reviewed- Renal function/electrolytes with Estimated Creatinine Clearance: 13.7 mL/min (A) (based on SCr of 3.53 mg/dL (H)). according to latest data. Monitor urine output and serial BMP and adjust therapy as needed. Avoid nephrotoxins and renally dose meds for GFR listed above.       UTI (urinary tract infection)  Acute, variable  WBCs 11-15, RBC TNTC, many bacteria; moderate leukocytes; positive nitrites  Initiated on Rocephin 1gm IV; Continue q24 hours  Monitor       Moderate chronic obstructive pulmonary disease  Chronic, variable  Denies home O2 use  CXR with chronic lung changes, no acute processes  Supplemental oxygen, PO steroids  Monitor       Hyperlipidemia  Chronic, variable  Continue previous medicine regimen of Zocor 80mg PO QD substituted for Lipitor 40mg PO QD, ASA 81mg PO QD  Obtain Lipid panel in AM  Monitor      Lumbosacral spondylosis without myelopathy  Chronic, variable  Continue home medicine regimen of Norco 10-325mg PO Q8 hours  Monitor      Hypertension  Chronic, variable  Home medicine regimens of Coreg 6.25mg PO BID, Losartan 50mg PO QD, Nifedipine ER 30mg PO QD on med list; has not been filled recently  Resume Coreg 6.25mg PO BID due to tachycardia  Monitor BP trend      VTE Risk Mitigation (From admission, onward)         Ordered     heparin (porcine) injection 5,000 Units  Every 8 hours         02/10/22 1446                   LEXIE Jeffrey  Department of Hospital Medicine   [facility]  [facility]

## 2022-02-10 NOTE — ED PROVIDER NOTES
Encounter Date: 2/10/2022       History     Chief Complaint   Patient presents with    Weakness    Shortness of Breath     Pt c/o weakness, SOB, and loss of appetite for approx 1 week.     Patient complains of generalized weakness and fatigue for the past week.  Also some mild shortness of breath which he attributes to the fatigue.  He was exposed to his daughter 3 weeks ago he tested positive for COVID.  Patient has had an occasional cough.  No associated vomiting or diarrhea.  No associated chest pain.  Patient states that he was a former smoker but quit approximately 10 years ago when he had a coronary artery stent.  He has been vaccinated for COVID x2.  Symptoms moderate.  No other associated symptoms modifying factors.  He does have a history of COPD but says that he does not wear oxygen and very rarely has symptoms related to COPD.        Review of patient's allergies indicates:  No Known Allergies  Past Medical History:   Diagnosis Date    Aortic regurgitation     Back pain     CAD (coronary artery disease)     Chronic back pain     Constipation     COPD (chronic obstructive pulmonary disease)     Generalized OA     Hyperlipidemia     Hypertension     Osteopenia determined by x-ray     Pulmonary stenosis      Past Surgical History:   Procedure Laterality Date    CORONARY ANGIOPLASTY WITH STENT PLACEMENT      FOOT SURGERY  2021    Dr Jackson    HIP SURGERY      LITHOTRIPSY       Family History   Problem Relation Age of Onset    Cancer Mother     Cancer Sister     Cancer Brother      Social History     Tobacco Use    Smoking status: Former Smoker    Smokeless tobacco: Never Used   Substance Use Topics    Alcohol use: Never    Drug use: Never     Review of Systems   Constitutional: Positive for fatigue. Negative for fever.   HENT: Negative for sore throat.    Respiratory: Positive for cough. Negative for shortness of breath.    Cardiovascular: Negative for chest pain.   Gastrointestinal:  Negative for nausea.   Genitourinary: Negative for dysuria.   Musculoskeletal: Negative for back pain.   Skin: Negative for rash.   Neurological: Negative for weakness.   Hematological: Does not bruise/bleed easily.   All other systems reviewed and are negative.      Physical Exam     Initial Vitals [02/10/22 0607]   BP Pulse Resp Temp SpO2   (!) 150/86 96 (!) 22 99 °F (37.2 °C) 95 %      MAP       --         Physical Exam    Constitutional: He appears well-developed and well-nourished.   HENT:   Head: Normocephalic and atraumatic.   Eyes: EOM are normal. Pupils are equal, round, and reactive to light.   Neck: Neck supple.   Normal range of motion.  Cardiovascular: Normal rate and regular rhythm.   Pulmonary/Chest: Breath sounds normal. No respiratory distress. He has no wheezes. He has no rhonchi.   Abdominal: Abdomen is soft. He exhibits no distension. There is no abdominal tenderness. There is no rebound and no guarding.   Musculoskeletal:         General: Normal range of motion.      Cervical back: Normal range of motion and neck supple.     Neurological: He is alert and oriented to person, place, and time.   Skin: Skin is warm and dry. Capillary refill takes less than 2 seconds.   Psychiatric: He has a normal mood and affect.         Medical Screening Exam   See Full Note    ED Course   Procedures  Labs Reviewed   COMPREHENSIVE METABOLIC PANEL - Abnormal; Notable for the following components:       Result Value    Glucose 129 (*)     BUN 76 (*)     Creatinine 3.53 (*)     BUN/Creatinine Ratio 22 (*)     Albumin 2.6 (*)     Globulin 4.6 (*)     ALT 7 (*)     AST 9 (*)     eGFR 17 (*)     All other components within normal limits   SARS-COV2 (COVID) W/ FLU ANTIGEN - Abnormal; Notable for the following components:    COVID-19 Ag Positive (*)     All other components within normal limits    Narrative:     Positive SARS-CoV antigen results indicate the presence of viral antigens; correlation with patient history and  presence of clinical signs & symptoms consistent with COVID-19 are necessary to determine infection status.   URINALYSIS, REFLEX TO URINE CULTURE - Abnormal; Notable for the following components:    Clarity, UA Cloudy (*)     Leukocytes, UA Moderate (*)     Nitrites, UA Positive (*)     Protein, UA 30  (*)     Blood, UA Large (*)     All other components within normal limits   D DIMER, QUANTITATIVE - Abnormal; Notable for the following components:    D-Dimer 6.34 (*)     All other components within normal limits   CBC WITH DIFFERENTIAL - Abnormal; Notable for the following components:    WBC 16.04 (*)     RBC 4.15 (*)     Hemoglobin 12.4 (*)     Hematocrit 38.3 (*)     Neutrophils % 88.0 (*)     Lymphocytes % 2.1 (*)     Monocytes % 8.8 (*)     Eosinophils % 0.0 (*)     Immature Granulocytes % 0.9 (*)     Neutrophils, Abs 14.13 (*)     Lymphocytes, Absolute 0.33 (*)     Monocytes, Absolute 1.41 (*)     Immature Granulocytes, Absolute 0.14 (*)     All other components within normal limits   MANUAL DIFFERENTIAL - Abnormal; Notable for the following components:    Segmented Neutrophils, Man % 89 (*)     Lymphocytes, Man % 2 (*)     Monocytes, Man % 9 (*)     All other components within normal limits   URINALYSIS, MICROSCOPIC - Abnormal; Notable for the following components:    WBC, UA 11-15 (*)     RBC, UA Too Numerous To Count (*)     Bacteria, UA Many (*)     All other components within normal limits   CBC W/ AUTO DIFFERENTIAL    Narrative:     The following orders were created for panel order CBC auto differential.  Procedure                               Abnormality         Status                     ---------                               -----------         ------                     CBC with Differential[[id number]]        Abnormal            Final result               Manual Differential[[id number]]          Abnormal            Final result                 Please view results for these tests on the individual  orders.        ECG Results          EKG 12-lead (In process)  Result time 02/10/22 17:01:33    In process by Interface, Lab In [facility] (02/10/22 17:01:33)                 Narrative:    Test Reason : R06.02,    Vent. Rate : 122 BPM     Atrial Rate : 000 BPM     P-R Int : 114 ms          QRS Dur : 146 ms      QT Int : 322 ms       P-R-T Axes : 072 -76 080 degrees     QTc Int : 431 ms    Sinus tachycardia  with PVC(s)  with PAC(s)  Lead(s) unsuitable for analysis:  V5  Possible sequence error: V1,V2 omitted  Marked left axis deviation  IV conduction defect  Inferior ST elevation  suggests early repolarization  Anterior T wave abnormality  is nonspecific  Abnormal ECG      Referred By: [email or web address]   SELF           Confirmed By:                              EKG 12-lead (Final result)  Result time 02/11/22 16:26:59    Final result by Interface, Lab In [facility] (02/11/22 16:26:59)                 Narrative:    Test Reason : R06.02,    Vent. Rate : 109 BPM     Atrial Rate : 000 BPM     P-R Int : 178 ms          QRS Dur : 148 ms      QT Int : 370 ms       P-R-T Axes : 083 -21 083 degrees     QTc Int : 456 ms    Sinus tachycardia  with PAC(s)  Leftward axis  Right bundle branch block  Abnormal ECG    Confirmed by Juanjo SHEFFIELD, Chen HUTCHISON ([id number]) on 2/11/2022 4:26:52 PM    Referred By: VANESSA   SELF           Confirmed By:Chen Geiger MD                            Imaging Results          US Lower Extremity Veins Bilateral (Final result)  Result time 02/10/22 09:59:13    Final result by Simon Virgen II, MD (02/10/22 09:59:13)                 Impression:      No evidence of deep venous thrombosis.    Ultrasound images stored and captured.      Electronically signed by: Simon Virgen  Date:    02/10/2022  Time:    09:59             Narrative:    EXAMINATION:  US LOWER EXTREMITY VEINS BILATERAL    CLINICAL HISTORY:  Other specified abnormal findings of blood chemistry    TECHNIQUE:  Duplex and color flow Doppler and  dynamic compression was performed of the bilateral lower extremity veins was performed.    COMPARISON:  None.    FINDINGS:  No evidence of echogenic, non-compressible thrombus seen in the visualized veins of the extremities.  Color Doppler venous waveform pattern is within normal limits.                               X-Ray Chest 1 View (Final result)  Result time 02/10/22 07:57:23    Final result by Rashaun Mccormack MD (02/10/22 07:57:23)                 Impression:      Chronic changes in the lung bases similar prior.  No acute abnormality detected.      Electronically signed by: Rashaun Mccormack  Date:    02/10/2022  Time:    07:57             Narrative:    EXAMINATION:  XR CHEST 1 VIEW    CLINICAL HISTORY:  Shortness of breath    TECHNIQUE:  Single frontal view of the chest was performed.    COMPARISON:  07/07/2021    FINDINGS:  There are interstitial opacities in the right more than lung base that are similar to prior exam.  There is no focal infiltrate.  No pneumothorax or pleural effusion.  The cardiac silhouette is within normal limits.                                 Medications   baricitinib (OLUMIANT) tablet 1 mg (1 mg Oral Given 2/14/22 0837)   cefTRIAXone (ROCEPHIN) 1 g in dextrose 5 % in water (D5W) 5 % 50 mL IVPB (MB+) (0 g Intravenous Stopped 2/14/22 0912)   cholecalciferol (vitamin D3) 800 Units (800 Units Oral Given 2/14/22 0837)   atorvastatin tablet 40 mg (40 mg Oral Given 2/14/22 2057)   aspirin EC tablet 81 mg (81 mg Oral Given 2/14/22 0837)   carvediloL tablet 6.25 mg (6.25 mg Oral Given 2/14/22 1714)   heparin (porcine) injection 5,000 Units (5,000 Units Subcutaneous Given 2/15/22 0557)   tamsulosin 24 hr capsule 0.4 mg (0.4 mg Oral Given 2/14/22 0837)   dexAMETHasone tablet 6 mg (6 mg Oral Given 2/14/22 0837)   bisacodyL suppository 10 mg (has no administration in time range)   acetaminophen tablet 1,000 mg (has no administration in time range)   dextromethorphan-guaiFENesin  mg/5 ml liquid 10 mL  (has no administration in time range)   ondansetron injection 8 mg (has no administration in time range)   simethicone chewable tablet 80 mg (has no administration in time range)   traZODone tablet 50 mg (has no administration in time range)   melatonin tablet 6 mg (6 mg Oral Given 2/14/22 2057)   oxyCODONE immediate release tablet 5 mg (5 mg Oral Given 2/15/22 0603)   sodium chloride 0.9% bolus 500 mL (0 mLs Intravenous Stopped 2/10/22 0945)   cefTRIAXone (ROCEPHIN) 1 g in dextrose 5 % in water (D5W) 5 % 50 mL IVPB (MB+) (0 g Intravenous Stopped 2/10/22 0930)                 ED Course as of 02/15/22 0647   Thu Feb 10, 2022   0616 EKG done at 6:08 a.m. shows sinus tachycardia rate 109. Leftward axis.  Right bundle-branch block.  Premature atrial contractions.   [PK]      ED Course User Index  [PK] Neeraj Centeno MD          Clinical Impression:   Final diagnoses:  [R06.02] Shortness of breath  [R79.89] D-dimer, elevated  [E86.0] Dehydration (Primary)  [U07.1] COVID-19  [N17.9] Acute kidney injury  [N39.0] Urinary tract infection without hematuria, site unspecified  [R06.02] SOB (shortness of breath)  [R77.8] Elevated troponin          ED Disposition Condition    Admit               Neeraj Centeno MD  02/10/22 1541       Neeraj Centeno MD  02/15/22 7360

## 2022-02-10 NOTE — ASSESSMENT & PLAN NOTE
Subacute, variable  Oxygen sat room air 88%  Covid 19 positive  D-dimer 6.34; negative LE dopplers  History of cardiac stent 20+ years ago  ECHO, VQ lung scan pending   Check troponin, BNP

## 2022-02-10 NOTE — ASSESSMENT & PLAN NOTE
Chronic, variable  Denies home O2 use  CXR with chronic lung changes, no acute processes  Supplemental oxygen, PO steroids  Monitor

## 2022-02-10 NOTE — PLAN OF CARE
Problem: Adult Inpatient Plan of Care  Goal: Plan of Care Review  Outcome: Ongoing, Progressing  Goal: Patient-Specific Goal (Individualized)  Outcome: Ongoing, Progressing  Goal: Absence of Hospital-Acquired Illness or Injury  Outcome: Ongoing, Progressing  Goal: Optimal Comfort and Wellbeing  Outcome: Ongoing, Progressing  Goal: Readiness for Transition of Care  Outcome: Ongoing, Progressing     Problem: Fall Injury Risk  Goal: Absence of Fall and Fall-Related Injury  Outcome: Ongoing, Progressing     Problem: Fluid and Electrolyte Imbalance (Acute Kidney Injury/Impairment)  Goal: Fluid and Electrolyte Balance  Outcome: Ongoing, Progressing     Problem: Oral Intake Inadequate (Acute Kidney Injury/Impairment)  Goal: Optimal Nutrition Intake  Outcome: Ongoing, Progressing     Problem: Renal Function Impairment (Acute Kidney Injury/Impairment)  Goal: Effective Renal Function  Outcome: Ongoing, Progressing

## 2022-02-10 NOTE — ASSESSMENT & PLAN NOTE
Acute, variable  Symptomatic with shortness of breath and weakness  Covid positive on PCR  RA oxygen saturations 88%; 92-94% on 2L   ABGs unremarkable  Unable to receive Remdesivir due to kidney function  Baricitinib 1mg PO QD initiated   Decadron 4mg PO QD  Monitor in isolation

## 2022-02-10 NOTE — ASSESSMENT & PLAN NOTE
Acute, variable  Palpable, non-reducible mass to RLQ suspicious for inguinal hernia  CT abdomen/pelvis w/o pending  Therapy pending diagnostics

## 2022-02-10 NOTE — ASSESSMENT & PLAN NOTE
Acute, variable  WBCs 11-15, RBC TNTC, many bacteria; moderate leukocytes; positive nitrites  Initiated on Rocephin 1gm IV; Continue q24 hours  Monitor

## 2022-02-10 NOTE — ASSESSMENT & PLAN NOTE
Chronic, variable  Continue previous medicine regimen of Zocor 80mg PO QD substituted for Lipitor 40mg PO QD, ASA 81mg PO QD  Obtain Lipid panel in AM  Monitor

## 2022-02-10 NOTE — ASSESSMENT & PLAN NOTE
Patient with acute kidney injury likely d/t IVVD/Dehydration Which is currently being treated. Labs reviewed- Renal function/electrolytes with Estimated Creatinine Clearance: 13.7 mL/min (A) (based on SCr of 3.53 mg/dL (H)). according to latest data. Monitor urine output and serial BMP and adjust therapy as needed. Avoid nephrotoxins and renally dose meds for GFR listed above.

## 2022-02-10 NOTE — ASSESSMENT & PLAN NOTE
Chronic, variable  Home medicine regimens of Coreg 6.25mg PO BID, Losartan 50mg PO QD, Nifedipine ER 30mg PO QD on med list; has not been filled recently  Resume Coreg 6.25mg PO BID due to tachycardia  Monitor BP trend

## 2022-02-11 LAB
ANION GAP SERPL CALCULATED.3IONS-SCNC: 19 MMOL/L (ref 7–16)
BASOPHILS # BLD AUTO: 0.05 K/UL (ref 0–0.2)
BASOPHILS NFR BLD AUTO: 0.3 % (ref 0–1)
BUN SERPL-MCNC: 88 MG/DL (ref 7–18)
BUN/CREAT SERPL: 25 (ref 6–20)
CALCIUM SERPL-MCNC: 8.2 MG/DL (ref 8.5–10.1)
CHLORIDE SERPL-SCNC: 103 MMOL/L (ref 98–107)
CHOLEST SERPL-MCNC: 91 MG/DL (ref 0–200)
CHOLEST/HDLC SERPL: 7 {RATIO}
CO2 SERPL-SCNC: 21 MMOL/L (ref 21–32)
CREAT SERPL-MCNC: 3.59 MG/DL (ref 0.7–1.3)
DIFFERENTIAL METHOD BLD: ABNORMAL
EOSINOPHIL # BLD AUTO: 0.04 K/UL (ref 0–0.5)
EOSINOPHIL NFR BLD AUTO: 0.2 % (ref 1–4)
ERYTHROCYTE [DISTWIDTH] IN BLOOD BY AUTOMATED COUNT: 13.8 % (ref 11.5–14.5)
GLUCOSE SERPL-MCNC: 116 MG/DL (ref 74–106)
GLUCOSE SERPL-MCNC: 224 MG/DL (ref 70–105)
HCT VFR BLD AUTO: 32.4 % (ref 40–54)
HDLC SERPL-MCNC: 13 MG/DL (ref 40–60)
HGB BLD-MCNC: 10.8 G/DL (ref 13.5–18)
IMM GRANULOCYTES # BLD AUTO: 1.65 K/UL (ref 0–0.04)
IMM GRANULOCYTES NFR BLD: 9.1 % (ref 0–0.4)
LDLC SERPL CALC-MCNC: 54 MG/DL
LDLC/HDLC SERPL: 4.2 {RATIO}
LYMPHOCYTES # BLD AUTO: 0.53 K/UL (ref 1–4.8)
LYMPHOCYTES NFR BLD AUTO: 2.9 % (ref 27–41)
LYMPHOCYTES NFR BLD MANUAL: 2 % (ref 27–41)
MCH RBC QN AUTO: 29.8 PG (ref 27–31)
MCHC RBC AUTO-ENTMCNC: 33.3 G/DL (ref 32–36)
MCV RBC AUTO: 89.5 FL (ref 80–96)
METAMYELOCYTES NFR BLD MANUAL: 1 %
MONOCYTES # BLD AUTO: 0.34 K/UL (ref 0–0.8)
MONOCYTES NFR BLD AUTO: 1.9 % (ref 2–6)
MONOCYTES NFR BLD MANUAL: 1 % (ref 2–6)
MPC BLD CALC-MCNC: 11.3 FL (ref 9.4–12.4)
MYELOCYTES NFR BLD MANUAL: 1 %
NEUTROPHILS # BLD AUTO: 15.6 K/UL (ref 1.8–7.7)
NEUTROPHILS NFR BLD AUTO: 85.6 % (ref 53–65)
NEUTS BAND NFR BLD MANUAL: 8 % (ref 1–5)
NEUTS SEG NFR BLD MANUAL: 87 % (ref 50–62)
NONHDLC SERPL-MCNC: 78 MG/DL
NRBC # BLD AUTO: 0 X10E3/UL
NRBC, AUTO (.00): 0 %
PLATELET # BLD AUTO: 150 K/UL (ref 150–400)
PLATELET MORPHOLOGY: ABNORMAL
POTASSIUM SERPL-SCNC: 4.2 MMOL/L (ref 3.5–5.1)
RBC # BLD AUTO: 3.62 M/UL (ref 4.6–6.2)
RBC MORPH BLD: NORMAL
SODIUM SERPL-SCNC: 139 MMOL/L (ref 136–145)
TRIGL SERPL-MCNC: 122 MG/DL (ref 35–150)
TROPONIN I SERPL HS-MCNC: 40.4 PG/ML
TROPONIN I SERPL HS-MCNC: 49.3 PG/ML
TSH SERPL DL<=0.005 MIU/L-ACNC: 0.31 UIU/ML (ref 0.36–3.74)
VLDLC SERPL-MCNC: 24 MG/DL
WBC # BLD AUTO: 18.21 K/UL (ref 4.5–11)

## 2022-02-11 PROCEDURE — 25000003 PHARM REV CODE 250: Performed by: NURSE PRACTITIONER

## 2022-02-11 PROCEDURE — 63600175 PHARM REV CODE 636 W HCPCS: Performed by: NURSE PRACTITIONER

## 2022-02-11 PROCEDURE — 36415 COLL VENOUS BLD VENIPUNCTURE: CPT | Performed by: NURSE PRACTITIONER

## 2022-02-11 PROCEDURE — 84484 ASSAY OF TROPONIN QUANT: CPT | Performed by: NURSE PRACTITIONER

## 2022-02-11 PROCEDURE — 82962 GLUCOSE BLOOD TEST: CPT

## 2022-02-11 PROCEDURE — 80048 BASIC METABOLIC PNL TOTAL CA: CPT | Performed by: NURSE PRACTITIONER

## 2022-02-11 PROCEDURE — 25000003 PHARM REV CODE 250: Performed by: EMERGENCY MEDICINE

## 2022-02-11 PROCEDURE — 11000001 HC ACUTE MED/SURG PRIVATE ROOM

## 2022-02-11 PROCEDURE — C9399 UNCLASSIFIED DRUGS OR BIOLOG: HCPCS | Performed by: NURSE PRACTITIONER

## 2022-02-11 PROCEDURE — 93010 EKG 12-LEAD: ICD-10-PCS | Mod: ,,, | Performed by: HOSPITALIST

## 2022-02-11 PROCEDURE — 85025 COMPLETE CBC W/AUTO DIFF WBC: CPT | Performed by: NURSE PRACTITIONER

## 2022-02-11 PROCEDURE — 63600175 PHARM REV CODE 636 W HCPCS: Performed by: HOSPITALIST

## 2022-02-11 PROCEDURE — 93005 ELECTROCARDIOGRAM TRACING: CPT

## 2022-02-11 PROCEDURE — 80061 LIPID PANEL: CPT | Performed by: NURSE PRACTITIONER

## 2022-02-11 PROCEDURE — 99233 SBSQ HOSP IP/OBS HIGH 50: CPT | Mod: ,,, | Performed by: HOSPITALIST

## 2022-02-11 PROCEDURE — 25000242 PHARM REV CODE 250 ALT 637 W/ HCPCS: Performed by: HOSPITALIST

## 2022-02-11 PROCEDURE — 27000221 HC OXYGEN, UP TO 24 HOURS

## 2022-02-11 PROCEDURE — 99233 PR SUBSEQUENT HOSPITAL CARE,LEVL III: ICD-10-PCS | Mod: ,,, | Performed by: HOSPITALIST

## 2022-02-11 PROCEDURE — 93010 ELECTROCARDIOGRAM REPORT: CPT | Mod: ,,, | Performed by: HOSPITALIST

## 2022-02-11 RX ADMIN — CARVEDILOL 6.25 MG: 6.25 TABLET, FILM COATED ORAL at 04:02

## 2022-02-11 RX ADMIN — TAMSULOSIN HYDROCHLORIDE 0.4 MG: 0.4 CAPSULE ORAL at 09:02

## 2022-02-11 RX ADMIN — BARICITINIB 1 MG: 1 TABLET, FILM COATED ORAL at 09:02

## 2022-02-11 RX ADMIN — HYDROCODONE BITARTRATE AND ACETAMINOPHEN 1 TABLET: 7.5; 325 TABLET ORAL at 04:02

## 2022-02-11 RX ADMIN — ASPIRIN 81 MG: 81 TABLET, COATED ORAL at 09:02

## 2022-02-11 RX ADMIN — HEPARIN SODIUM 5000 UNITS: 5000 INJECTION INTRAVENOUS; SUBCUTANEOUS at 04:02

## 2022-02-11 RX ADMIN — CARVEDILOL 6.25 MG: 6.25 TABLET, FILM COATED ORAL at 09:02

## 2022-02-11 RX ADMIN — ATORVASTATIN CALCIUM 40 MG: 40 TABLET, FILM COATED ORAL at 09:02

## 2022-02-11 RX ADMIN — CHOLECALCIFEROL TAB 10 MCG (400 UNIT) 800 UNITS: 10 TAB at 09:02

## 2022-02-11 RX ADMIN — DEXAMETHASONE 6 MG: 2 TABLET ORAL at 09:02

## 2022-02-11 RX ADMIN — HEPARIN SODIUM 5000 UNITS: 5000 INJECTION INTRAVENOUS; SUBCUTANEOUS at 09:02

## 2022-02-11 RX ADMIN — CEFTRIAXONE SODIUM 1 G: 1 INJECTION, POWDER, FOR SOLUTION INTRAMUSCULAR; INTRAVENOUS at 09:02

## 2022-02-11 RX ADMIN — HYDROCODONE BITARTRATE AND ACETAMINOPHEN 1 TABLET: 7.5; 325 TABLET ORAL at 09:02

## 2022-02-11 RX ADMIN — HEPARIN SODIUM 5000 UNITS: 5000 INJECTION INTRAVENOUS; SUBCUTANEOUS at 05:02

## 2022-02-11 RX ADMIN — SODIUM CHLORIDE: 9 INJECTION, SOLUTION INTRAVENOUS at 05:02

## 2022-02-11 NOTE — ASSESSMENT & PLAN NOTE
Patient with acute kidney injury likely d/t IVVD/Dehydration Which is currently being treated. Labs reviewed- Renal function/electrolytes with Estimated Creatinine Clearance: 13.4 mL/min (A) (based on SCr of 3.59 mg/dL (H)). according to latest data. Monitor urine output and serial BMP and adjust therapy as needed. Avoid nephrotoxins and renally dose meds for GFR listed above.     2/11: No improvement with BUN/Creat; felt to be postrenal azotemia. Pending urology consult

## 2022-02-11 NOTE — PROGRESS NOTES
No improvement in renal function with IV fluids.  Urology consulted for obstructive uropathy.    Consult cardiology if new onset heart failure.

## 2022-02-11 NOTE — ASSESSMENT & PLAN NOTE
Acute, variable  IVF bolus NS 500cc  IVF maintenance at 75ml/hr  Monitor BMP    2/11: No improvement with BUN/Creat with IVF; felt to be postrenal azotemia. Pending urology consult

## 2022-02-11 NOTE — CONSULTS
[facility] - [address] [place] [address]  General Surgery  Consult Note    Patient Name: Coleman Lares  MRN: [id number]  Code Status: No Order  Admission Date: 2/10/2022  Hospital Length of Stay: 0 days  Attending Physician: Chen Geiger MD  Primary Care Provider: Salinas Bird DO    Patient information was obtained from patient and ER records.     Inpatient consult to General Surgery  Consult performed by: Bony Pedraza DO  Consult ordered by: LEXIE Irving        Subjective:     Principal Problem: COVID-19    History of Present Illness: No notes on file    No current facility-administered medications on file prior to encounter.     Current Outpatient Medications on File Prior to Encounter   Medication Sig    acetaminophen (TYLENOL) 325 MG tablet Take 325 mg by mouth every 6 (six) hours as needed for Pain.    aspirin (ECOTRIN) 81 MG EC tablet Take 1 tablet (81 mg total) by mouth once daily.    carvediloL (COREG) 6.25 MG tablet Take 6.25 mg by mouth once daily.     cholecalciferol, vitamin D3, 10 mcg (400 unit) Cap Take 2 capsules (800 Units total) by mouth once daily.    HYDROcodone-acetaminophen (NORCO)  mg per tablet Take 1 tablet by mouth every 8 (eight) hours.    [START ON 3/7/2022] HYDROcodone-acetaminophen (NORCO)  mg per tablet Take 1 tablet by mouth every 8 (eight) hours.    [START ON 4/6/2022] HYDROcodone-acetaminophen (NORCO)  mg per tablet Take 1 tablet by mouth every 8 (eight) hours.    losartan (COZAAR) 50 MG tablet Take 1 tablet (50 mg total) by mouth once daily.    NIFEdipine (ADALAT CC) 30 MG TbSR Take 30 mg by mouth once daily.    simvastatin (ZOCOR) 80 MG tablet Take 80 mg by mouth every evening. Take 1/2 tablet once at night.    traZODone (DESYREL) 50 MG tablet Take 1 tablet (50 mg total) by mouth every evening.       Review of patient's allergies indicates:  No Known Allergies    Past Medical History:   Diagnosis Date    Aortic regurgitation      Back pain     CAD (coronary artery disease)     Chronic back pain     Constipation     COPD (chronic obstructive pulmonary disease)     Generalized OA     Hyperlipidemia     Hypertension     Osteopenia determined by x-ray     Pulmonary stenosis      Past Surgical History:   Procedure Laterality Date    CORONARY ANGIOPLASTY WITH STENT PLACEMENT      FOOT SURGERY  2021    Dr Jackson    HIP SURGERY      LITHOTRIPSY       Family History     Problem Relation (Age of Onset)    Cancer Mother, Sister, Brother        Tobacco Use    Smoking status: Former Smoker    Smokeless tobacco: Never Used   Substance and Sexual Activity    Alcohol use: Never    Drug use: Never    Sexual activity: Not on file     Review of Systems   Constitutional: Positive for appetite change. Negative for chills, fever and unexpected weight change.   HENT: Negative.  Negative for trouble swallowing.    Eyes: Negative.    Respiratory: Positive for shortness of breath. Negative for chest tightness.    Cardiovascular: Negative.  Negative for chest pain.   Gastrointestinal: Negative.  Negative for abdominal distention, abdominal pain, blood in stool and nausea.   Endocrine: Negative.    Genitourinary: Negative.  Negative for hematuria.   Musculoskeletal: Negative.  Negative for back pain and myalgias.   Skin: Negative.  Negative for color change.   Allergic/Immunologic: Negative.    Neurological: Positive for weakness. Negative for dizziness, syncope and light-headedness.   Psychiatric/Behavioral: Negative.  Negative for agitation.     Objective:     Vital Signs (Most Recent):  Temp: 99 °F (37.2 °C) (02/10/22 1339)  Pulse: 89 (02/10/22 1339)  Resp: (!) 28 (02/10/22 1339)  BP: 120/65 (02/10/22 1339)  SpO2: (!) 94 % (02/10/22 1339) Vital Signs (24h Range):  Temp:  [99 °F (37.2 °C)] 99 °F (37.2 °C)  Pulse:  [] 89  Resp:  [14-40] 28  SpO2:  [82 %-95 %] 94 %  BP: ()/() 120/65     Weight: 68.1 kg (150 lb 2.1 oz)  Body mass  index is 20.94 kg/m².    Physical Exam  Constitutional:       General: He is not in acute distress.     Appearance: Normal appearance.   HENT:      Head: Normocephalic.      Nose: Nose normal.   Eyes:      Pupils: Pupils are equal, round, and reactive to light.   Cardiovascular:      Rate and Rhythm: Normal rate.   Pulmonary:      Effort: Pulmonary effort is normal. No respiratory distress.   Abdominal:      Palpations: Abdomen is soft.      Tenderness: There is no abdominal tenderness. There is no guarding or rebound.      Hernia: A hernia is present. Hernia is present in the left inguinal area and right inguinal area.   Genitourinary:         Comments: Bilateral inguinal hernias that are reducible; nontender to palpation  Musculoskeletal:         General: Normal range of motion.      Cervical back: Normal range of motion.   Skin:     General: Skin is warm.      Coloration: Skin is not jaundiced.   Neurological:      General: No focal deficit present.      Mental Status: He is alert and oriented to person, place, and time.      Cranial Nerves: No cranial nerve deficit.   Psychiatric:         Mood and Affect: Mood normal.         Significant Labs:  I have reviewed all pertinent lab results within the past 24 hours.  CBC:   Recent Labs   Lab 02/10/22  0614 02/10/22  1126   WBC 16.04*  --    RBC 4.15*  --    HGB 12.4*  --    HCT 38.3* 43     --    MCV 92.3  --    MCH 29.9  --    MCHC 32.4  --      BMP:   Recent Labs   Lab 02/10/22  0614   *      K 3.9   CL 99   CO2 25   BUN 76*   CREATININE 3.53*   CALCIUM 9.2       Significant Diagnostics:  I have reviewed all pertinent imaging results/findings within the past 24 hours.    Assessment/Plan:     History of bilateral inguinal hernias  Known history of bilateral inguinal hernias.    They are reducible and nontender.    With the patient's age and being asymptomatic and reducible, a recommend nonsurgical management.  The only time we will consider  repairing these would be if they were incarcerated and symptomatic.  Patient also agrees and states he does not want surgery unless he truly needs it.    No further surgical intervention at this time.  Medical management per primary.  Patient can follow up with Bony Adhikari, DO outpatient if he starts having any worsening symptoms      VTE Risk Mitigation (From admission, onward)         Ordered     heparin (porcine) injection 5,000 Units  Every 8 hours         02/10/22 3090                Thank you for your consult. I will sign off. Please contact us if you have any additional questions.    Bony Adhikari, DO  General Surgery  [facility] - [address] [place] [address]

## 2022-02-11 NOTE — SUBJECTIVE & OBJECTIVE
Interval History: AAO; NADN. Pending diagnostics and consults    Review of Systems   Constitutional: Negative for activity change and fatigue.   HENT: Negative for sinus pressure and sinus pain.    Eyes: Negative for pain and discharge.   Respiratory: Negative for cough and shortness of breath.    Cardiovascular: Negative for chest pain and palpitations.   Gastrointestinal: Negative for nausea and vomiting.   Endocrine: Negative for cold intolerance and heat intolerance.   Genitourinary: Negative for decreased urine volume and difficulty urinating.   Musculoskeletal: Negative for arthralgias and gait problem.   Skin: Negative for color change and wound.   Allergic/Immunologic: Negative for environmental allergies and food allergies.   Neurological: Negative for dizziness and weakness.   Hematological: Negative for adenopathy. Does not bruise/bleed easily.   Psychiatric/Behavioral: Negative for agitation and confusion.   All other systems reviewed and are negative.    Objective:     Vital Signs (Most Recent):  Temp: 97.3 °F (36.3 °C) (02/11/22 1114)  Pulse: 63 (02/11/22 1114)  Resp: 18 (02/11/22 1114)  BP: 97/61 (02/11/22 1114)  SpO2: 96 % (02/11/22 1114) Vital Signs (24h Range):  Temp:  [97.3 °F (36.3 °C)-97.7 °F (36.5 °C)] 97.3 °F (36.3 °C)  Pulse:  [63-79] 63  Resp:  [16-20] 18  SpO2:  [94 %-96 %] 96 %  BP: ()/(61-69) 97/61     Weight: 68.1 kg (150 lb 2.1 oz)  Body mass index is 20.94 kg/m².  No intake or output data in the 24 hours ending 02/11/22 1402   Physical Exam    Significant Labs:   All pertinent labs within the past 24 hours have been reviewed.  Recent Lab Results       02/11/22  1012   02/11/22  0442   02/10/22  2219   02/10/22  1552   02/10/22  1504        POC A-aDO2         65       Anion Gap   19             Ao root annulus               Ao peak kiran               Ao VTI               AR Max Kiran               AV valve area               AORTIC VALVE CUSP SEPERATION               AV mean gradient                AV index (prosthetic)               AV peak gradient               AV regurgitation pressure 1/2 time               AV Velocity Ratio               Bands   8             Baso #   0.05             Basophil %   0.3             BSA               BUN   88             BUN/CREAT RATIO   25             Calcium   8.2             Chloride   103             CHOL/HDLC Ratio   7.0             Cholesterol   91  Comment:   <200 mg/dL:  Desirable  200-240 mg/dL: Borderline High  >240 mg/dL:  High             CO2   21             Creatinine   3.59             Left Ventricle Relative Wall Thickness               Differential Type   Manual             Echo EF Estimated               eGFR if non    17             EF               Eos #   0.04             Eosinophil %   0.2             E wave deceleration time               FS               Glucose   116             HDL   13  Comment:   <40 mg/dL: Low HDL  40-60 mg/dL: Normal  >60 mg/dL: Desirable             Hematocrit   32.4             Hemoglobin   10.8             Hepatitis B Surface Ag       Non-Reactive         Immature Grans (Abs)   1.65             Immature Granulocytes   9.1             IVC ostium               IVS               LA size               LVOT area               LDL Calculated   54             LDL/HDL Ratio   4.2             LV Diastolic Volume               LV Diastolic Volume Index               LVIDd               LVIDs               LV mass               LV Mass Index               Left Ventricular Outflow Tract Mean Gradient               LVOT diameter               LVOT peak jayant               LVOT stroke volume               LVOT peak VTI               LV Systolic Volume               LV Systolic Volume Index               Lymph #   0.53             Lymph %   2.9                2             MCH   29.8             MCHC   33.3             MCV   89.5             Metamyelocytes   1             Mono #   0.34             Mono %    1.9                1             MPV   11.3             MV Peak E Kiran               Myelocytes   1             Neutrophils, Abs   15.60             Neutrophils Relative   85.6             Non-HDL Cholesterol   78             nRBC   0.0             NT-proBNP       10,783         NUCLEATED RBC ABSOLUTE   0.00             PLATELET MORPHOLOGY   Large & Giant Platelets             Platelets   150             POC Base Excess         -2.3       POC HCO3         21.2       POC PCO2         32       POC PH         7.43       POC PO2         95       POC SATURATED O2         97.8       Potassium   4.2             Posterior Wall               Right Atrial Pressure (from IVC)               RA Major Axis               RBC   3.62             RBC Morphology   Normal             RDW   13.8             RVDD               Segmented Neutrophils, Man %   87             Sodium   139             TAPSE               Triglycerides   122  Comment:   Normal:  <150 mg/dL  Borderline High: 150-199 mg/dL  High:   200-499 mg/dL  Very High:  >=500             Triscuspid Valve Regurgitation Peak Gradient               TR Max Kiran               Troponin I High Sensitivity 40.4   49.3   83.5   153.2         TV rest pulmonary artery pressure               VLDL Cholesterol Chandana   24             WBC   18.21                              02/10/22  1409        POC A-aDO2       Anion Gap       Ao root annulus 3.60       Ao peak kiran 1.1       Ao VTI 13.00       AR Max Kirna 2.55       AV valve area 2.18       AORTIC VALVE CUSP SEPERATION [id number]       AV mean gradient 3       AV index (prosthetic) 0.77       AV peak gradient 5       AV regurgitation pressure 1/2 time 516       AV Velocity Ratio 0.64       Bands       Baso #       Basophil %       BSA 1.85       BUN       BUN/CREAT RATIO       Calcium       Chloride       CHOL/HDLC Ratio       Cholesterol       CO2       Creatinine       Left Ventricle Relative Wall Thickness 0.59       Differential  Type       Echo EF Estimated 35       eGFR if non        EF 25       Eos #       Eosinophil %       E wave deceleration time 117       FS 7       Glucose       HDL       Hematocrit       Hemoglobin       Hepatitis B Surface Ag       Immature Grans (Abs)       Immature Granulocytes       IVC ostium 1.1       IVS 1.24       LA size 2.50       LVOT area 2.8       LDL Calculated       LDL/HDL Ratio       LV Diastolic Volume 77.70       LV Diastolic Volume Index 41.55       LVIDd 4.18       LVIDs 3.90       LV mass 184.51       LV Mass Index 99       Left Ventricular Outflow Tract Mean Gradient 1.00       LVOT diameter 1.90       LVOT peak kiran 0.7       LVOT stroke volume 28.34       LVOT peak VTI 10.00       LV Systolic Volume 65.90       LV Systolic Volume Index 35.2       Lymph #       Lymph %       MCH       MCHC       MCV       Metamyelocytes       Mono #       Mono %       MPV       MV Peak E Kiran 0.73       Myelocytes       Neutrophils, Abs       Neutrophils Relative       Non-HDL Cholesterol       nRBC       NT-proBNP       NUCLEATED RBC ABSOLUTE       PLATELET MORPHOLOGY       Platelets       POC Base Excess       POC HCO3       POC PCO2       POC PH       POC PO2       POC SATURATED O2       Potassium       Posterior Wall 1.23       Right Atrial Pressure (from IVC) 3       RA Major Axis 3.30       RBC       RBC Morphology       RDW       RVDD 3.70       Segmented Neutrophils, Man %       Sodium       TAPSE 1.60       Triglycerides       Triscuspid Valve Regurgitation Peak Gradient 18       TR Max Kiran 2.10       Troponin I High Sensitivity       TV rest pulmonary artery pressure 21       VLDL Cholesterol Chandana       WBC             Significant Imaging: I have reviewed all pertinent imaging results/findings within the past 24 hours.  VQ lung scan low probability for PE

## 2022-02-11 NOTE — ASSESSMENT & PLAN NOTE
Acute, variable  Symptomatic with shortness of breath and weakness  Covid positive on PCR  RA oxygen saturations 88%; 92-94% on 2L   ABGs unremarkable  Unable to receive Remdesivir due to kidney function  Baricitinib 1mg PO QD initiated   Decadron 4mg PO QD  Monitor in isolation    2/11: Day 2 of medicine regimens. 82-95% on 2L. Continue to monitor

## 2022-02-11 NOTE — PLAN OF CARE
[facility]  Initial Discharge Assessment       Primary Care Provider: Salinas Bird DO    Admission Diagnosis: Shortness of breath [R06.02]  Dehydration [E86.0]  Acute kidney injury [N17.9]  D-dimer, elevated [R79.89]  Urinary tract infection without hematuria, site unspecified [N39.0]  COVID-19 [U07.1]    Admission Date: 2/10/2022  Expected Discharge Date:     Discharge Barriers Identified: None    Payor: [facility] MANAGED MCARE / Plan: [facility] MEDICARE COMPLETE / Product Type: Medicare Advantage /     Extended Emergency Contact Information  Primary Emergency Contact: ALL ARELLANO  Mobile Phone: [phone]  Relation: Son  Preferred language: English   needed? No    Discharge Plan A: Home  Discharge Plan B: Home      [place] [facility] - [place] - [address]  [address]  [place] [address]  Phone: [phone] Fax: [phone]    [address] [address]  Phone: [phone] Fax: [phone]      Initial Assessment (most recent)     Adult Discharge Assessment - 02/11/22 1003        Discharge Assessment    Assessment Type Discharge Planning Assessment     Source of Information patient     Lives With alone     Current cognitive status: Alert/Oriented     Equipment Currently Used at Home walker, rolling;wheelchair     Do you currently have service(s) that help you manage your care at home? No     Who is going to help you get home at discharge? jin mckeon     Discharge Plan A Home     Discharge Plan B Home     DME Needed Upon Discharge  none     Discharge Plan discussed with: Patient     Discharge Barriers Identified None               Ss spoke with pt, states he lives at home alone. Not current with HH. Uses walker and wc when needed. Plan at d/c is to return home. IM obtained. SS following for d/c needs prn.

## 2022-02-11 NOTE — ASSESSMENT & PLAN NOTE
Acute, variable  Palpable, non-reducible mass to RLQ suspicious for inguinal hernia  CT abdomen/pelvis w/o pending  Therapy pending diagnostics    2/11: Bilateral inguinal hernias on CT AP; Reduced by surgery. No further issues at this time

## 2022-02-11 NOTE — SUBJECTIVE & OBJECTIVE
No current facility-administered medications on file prior to encounter.     Current Outpatient Medications on File Prior to Encounter   Medication Sig    acetaminophen (TYLENOL) 325 MG tablet Take 325 mg by mouth every 6 (six) hours as needed for Pain.    aspirin (ECOTRIN) 81 MG EC tablet Take 1 tablet (81 mg total) by mouth once daily.    carvediloL (COREG) 6.25 MG tablet Take 6.25 mg by mouth once daily.     cholecalciferol, vitamin D3, 10 mcg (400 unit) Cap Take 2 capsules (800 Units total) by mouth once daily.    HYDROcodone-acetaminophen (NORCO)  mg per tablet Take 1 tablet by mouth every 8 (eight) hours.    [START ON 3/7/2022] HYDROcodone-acetaminophen (NORCO)  mg per tablet Take 1 tablet by mouth every 8 (eight) hours.    [START ON 4/6/2022] HYDROcodone-acetaminophen (NORCO)  mg per tablet Take 1 tablet by mouth every 8 (eight) hours.    losartan (COZAAR) 50 MG tablet Take 1 tablet (50 mg total) by mouth once daily.    NIFEdipine (ADALAT CC) 30 MG TbSR Take 30 mg by mouth once daily.    simvastatin (ZOCOR) 80 MG tablet Take 80 mg by mouth every evening. Take 1/2 tablet once at night.    traZODone (DESYREL) 50 MG tablet Take 1 tablet (50 mg total) by mouth every evening.       Review of patient's allergies indicates:  No Known Allergies    Past Medical History:   Diagnosis Date    Aortic regurgitation     Back pain     CAD (coronary artery disease)     Chronic back pain     Constipation     COPD (chronic obstructive pulmonary disease)     Generalized OA     Hyperlipidemia     Hypertension     Osteopenia determined by x-ray     Pulmonary stenosis      Past Surgical History:   Procedure Laterality Date    CORONARY ANGIOPLASTY WITH STENT PLACEMENT      FOOT SURGERY  2021    Dr Jackson    HIP SURGERY      LITHOTRIPSY       Family History     Problem Relation (Age of Onset)    Cancer Mother, Sister, Brother        Tobacco Use    Smoking status: Former Smoker     Smokeless tobacco: Never Used   Substance and Sexual Activity    Alcohol use: Never    Drug use: Never    Sexual activity: Not on file     Review of Systems   Constitutional: Positive for appetite change. Negative for chills, fever and unexpected weight change.   HENT: Negative.  Negative for trouble swallowing.    Eyes: Negative.    Respiratory: Positive for shortness of breath. Negative for chest tightness.    Cardiovascular: Negative.  Negative for chest pain.   Gastrointestinal: Negative.  Negative for abdominal distention, abdominal pain, blood in stool and nausea.   Endocrine: Negative.    Genitourinary: Negative.  Negative for hematuria.   Musculoskeletal: Negative.  Negative for back pain and myalgias.   Skin: Negative.  Negative for color change.   Allergic/Immunologic: Negative.    Neurological: Positive for weakness. Negative for dizziness, syncope and light-headedness.   Psychiatric/Behavioral: Negative.  Negative for agitation.     Objective:     Vital Signs (Most Recent):  Temp: 99 °F (37.2 °C) (02/10/22 1339)  Pulse: 89 (02/10/22 1339)  Resp: (!) 28 (02/10/22 1339)  BP: 120/65 (02/10/22 1339)  SpO2: (!) 94 % (02/10/22 1339) Vital Signs (24h Range):  Temp:  [99 °F (37.2 °C)] 99 °F (37.2 °C)  Pulse:  [] 89  Resp:  [14-40] 28  SpO2:  [82 %-95 %] 94 %  BP: ()/() 120/65     Weight: 68.1 kg (150 lb 2.1 oz)  Body mass index is 20.94 kg/m².    Physical Exam  Constitutional:       General: He is not in acute distress.     Appearance: Normal appearance.   HENT:      Head: Normocephalic.      Nose: Nose normal.   Eyes:      Pupils: Pupils are equal, round, and reactive to light.   Cardiovascular:      Rate and Rhythm: Normal rate.   Pulmonary:      Effort: Pulmonary effort is normal. No respiratory distress.   Abdominal:      Palpations: Abdomen is soft.      Tenderness: There is no abdominal tenderness. There is no guarding or rebound.      Hernia: A hernia is present. Hernia is present in  the left inguinal area and right inguinal area.   Genitourinary:         Comments: Bilateral inguinal hernias that are reducible; nontender to palpation  Musculoskeletal:         General: Normal range of motion.      Cervical back: Normal range of motion.   Skin:     General: Skin is warm.      Coloration: Skin is not jaundiced.   Neurological:      General: No focal deficit present.      Mental Status: He is alert and oriented to person, place, and time.      Cranial Nerves: No cranial nerve deficit.   Psychiatric:         Mood and Affect: Mood normal.         Significant Labs:  I have reviewed all pertinent lab results within the past 24 hours.  CBC:   Recent Labs   Lab 02/10/22  0614 02/10/22  1126   WBC 16.04*  --    RBC 4.15*  --    HGB 12.4*  --    HCT 38.3* 43     --    MCV 92.3  --    MCH 29.9  --    MCHC 32.4  --      BMP:   Recent Labs   Lab 02/10/22  0614   *      K 3.9   CL 99   CO2 25   BUN 76*   CREATININE 3.53*   CALCIUM 9.2       Significant Diagnostics:  I have reviewed all pertinent imaging results/findings within the past 24 hours.

## 2022-02-11 NOTE — ASSESSMENT & PLAN NOTE
Chronic, variable  Home medicine regimens of Coreg 6.25mg PO BID, Losartan 50mg PO QD, Nifedipine ER 30mg PO QD on med list; has not been filled recently  Resume Coreg 6.25mg PO BID due to tachycardia  Monitor BP trend    2/11: Variable BP trend; 90s-170s systolic. Continue Coreg 6.25mg PO BID at this time. Alterations to regimens as indicated

## 2022-02-11 NOTE — HOSPITAL COURSE
89-year-old  male presented to the hospital with fatigue, weakness and shortness of breath.  His daughter was recently diagnosed with COVID.  Patient found to be positive with COVID, dehydrated with acute kidney injury and admitted for medical management.  During his evaluation per Internal Medicine, patient was found to have bilateral inguinal hernias.  Patient states he has had bilateral inguinal hernias for approximately 1 year.  Patient states these do not bother him and they are not painful.  Patient is able to push them back in; denies any pain in bilateral groins.  Denies any chest pain, fevers or chills, nausea/vomiting/diarrhea.  States breathing is improving

## 2022-02-11 NOTE — ASSESSMENT & PLAN NOTE
Acute, variable  WBCs 11-15, RBC TNTC, many bacteria; moderate leukocytes; positive nitrites  Initiated on Rocephin 1gm IV; Continue q24 hours  Monitor     2/11: Continue Rocephin 1gm IV q24. Monitor

## 2022-02-11 NOTE — ASSESSMENT & PLAN NOTE
Acute, variable  D-dimer 6.34  BLE dopplers negative  Pending VQ lung scan  Heparin 5000u SQ Q8h for now   Monitor     2/11: Negative VQ lung scan; negative dopplers. Continue Heparin SQ Q8hrs

## 2022-02-11 NOTE — PROGRESS NOTES
[facility] - [address] [place] [address] Medicine  Progress Note    Patient Name: Coleman Lares  MRN: [id number]  Patient Class: IP- Inpatient   Admission Date: 2/10/2022  Length of Stay: 1 days  Attending Physician: Chen Geiger MD  Primary Care Provider: Salinas Bird DO        Subjective:     Principal Problem:COVID-19        HPI:  Mr. Lares is an 89 year old WM who presents to ED with complaint of generalized weakness and shortness of breath. He states he took his daughter to therapy appointment and shortly afterwards his daughter got sick. He states she was sick approximately 4-5 days and within the week he was sick. He states he has had an intermittent cough, shortness of breath, and weakness. He also endorses variable appetite for the last 3 days. Workup revealed he is Covid positive, dehydrated, and experiencing acute kidney injury.     Pertinent medical history of chronic back pain, constipation, hypertension, osteopenia, CAD, aortic regurgitation, pulmonary stenosis, COPD, HLD, vitamin D deficiency.       Overview/Hospital Course:  2/11: Seen by Surgery on yesterday for bilateral inguinal hernias and reduced. No complications noted. Pending consult with Urology for hydronephrosis/nephrolithiasis on CT AP. ECHO on yesterday with noted atrial fibrillation and EF of 25%. Cardizem resumed from previous medicine list, but no anticoagulant therapy noted and pharmacy states only filled pain medicine in the last 3 months. He has not had cardiology follow up. Previous Cardiologist Dr. Langley. Covid positive. Day 2 of Baricitinib. Pending VQ scan for elevated d-dimer       Interval History: AAO; NADN. Pending diagnostics and consults    Review of Systems   Constitutional: Negative for activity change and fatigue.   HENT: Negative for sinus pressure and sinus pain.    Eyes: Negative for pain and discharge.   Respiratory: Negative for cough and shortness of breath.    Cardiovascular:  Negative for chest pain and palpitations.   Gastrointestinal: Negative for nausea and vomiting.   Endocrine: Negative for cold intolerance and heat intolerance.   Genitourinary: Negative for decreased urine volume and difficulty urinating.   Musculoskeletal: Negative for arthralgias and gait problem.   Skin: Negative for color change and wound.   Allergic/Immunologic: Negative for environmental allergies and food allergies.   Neurological: Negative for dizziness and weakness.   Hematological: Negative for adenopathy. Does not bruise/bleed easily.   Psychiatric/Behavioral: Negative for agitation and confusion.   All other systems reviewed and are negative.    Objective:     Vital Signs (Most Recent):  Temp: 97.3 °F (36.3 °C) (02/11/22 1114)  Pulse: 63 (02/11/22 1114)  Resp: 18 (02/11/22 1114)  BP: 97/61 (02/11/22 1114)  SpO2: 96 % (02/11/22 1114) Vital Signs (24h Range):  Temp:  [97.3 °F (36.3 °C)-97.7 °F (36.5 °C)] 97.3 °F (36.3 °C)  Pulse:  [63-79] 63  Resp:  [16-20] 18  SpO2:  [94 %-96 %] 96 %  BP: ()/(61-69) 97/61     Weight: 68.1 kg (150 lb 2.1 oz)  Body mass index is 20.94 kg/m².  No intake or output data in the 24 hours ending 02/11/22 1402   Physical Exam    Significant Labs:   All pertinent labs within the past 24 hours have been reviewed.  Recent Lab Results       02/11/22  1012   02/11/22  0442   02/10/22  2219   02/10/22  1552   02/10/22  1504        POC A-aDO2         65       Anion Gap   19             Ao root annulus               Ao peak kiran               Ao VTI               AR Max Kiran               AV valve area               AORTIC VALVE CUSP SEPERATION               AV mean gradient               AV index (prosthetic)               AV peak gradient               AV regurgitation pressure 1/2 time               AV Velocity Ratio               Bands   8             Baso #   0.05             Basophil %   0.3             BSA               BUN   88             BUN/CREAT RATIO   25              Calcium   8.2             Chloride   103             CHOL/HDLC Ratio   7.0             Cholesterol   91  Comment:   <200 mg/dL:  Desirable  200-240 mg/dL: Borderline High  >240 mg/dL:  High             CO2   21             Creatinine   3.59             Left Ventricle Relative Wall Thickness               Differential Type   Manual             Echo EF Estimated               eGFR if non    17             EF               Eos #   0.04             Eosinophil %   0.2             E wave deceleration time               FS               Glucose   116             HDL   13  Comment:   <40 mg/dL: Low HDL  40-60 mg/dL: Normal  >60 mg/dL: Desirable             Hematocrit   32.4             Hemoglobin   10.8             Hepatitis B Surface Ag       Non-Reactive         Immature Grans (Abs)   1.65             Immature Granulocytes   9.1             IVC ostium               IVS               LA size               LVOT area               LDL Calculated   54             LDL/HDL Ratio   4.2             LV Diastolic Volume               LV Diastolic Volume Index               LVIDd               LVIDs               LV mass               LV Mass Index               Left Ventricular Outflow Tract Mean Gradient               LVOT diameter               LVOT peak kiran               LVOT stroke volume               LVOT peak VTI               LV Systolic Volume               LV Systolic Volume Index               Lymph #   0.53             Lymph %   2.9                2             MCH   29.8             MCHC   33.3             MCV   89.5             Metamyelocytes   1             Mono #   0.34             Mono %   1.9                1             MPV   11.3             MV Peak E Kiran               Myelocytes   1             Neutrophils, Abs   15.60             Neutrophils Relative   85.6             Non-HDL Cholesterol   78             nRBC   0.0             NT-proBNP       10,783         NUCLEATED RBC ABSOLUTE   0.00              PLATELET MORPHOLOGY   Large & Giant Platelets             Platelets   150             POC Base Excess         -2.3       POC HCO3         21.2       POC PCO2         32       POC PH         7.43       POC PO2         95       POC SATURATED O2         97.8       Potassium   4.2             Posterior Wall               Right Atrial Pressure (from IVC)               RA Major Axis               RBC   3.62             RBC Morphology   Normal             RDW   13.8             RVDD               Segmented Neutrophils, Man %   87             Sodium   139             TAPSE               Triglycerides   122  Comment:   Normal:  <150 mg/dL  Borderline High: 150-199 mg/dL  High:   200-499 mg/dL  Very High:  >=500             Triscuspid Valve Regurgitation Peak Gradient               TR Max Kiran               Troponin I High Sensitivity 40.4   49.3   83.5   153.2         TV rest pulmonary artery pressure               VLDL Cholesterol Chandana   24             WBC   18.21                              02/10/22  1409        POC A-aDO2       Anion Gap       Ao root annulus 3.60       Ao peak kiran 1.1       Ao VTI 13.00       AR Max Kiran 2.55       AV valve area 2.18       AORTIC VALVE CUSP SEPERATION [id number]       AV mean gradient 3       AV index (prosthetic) 0.77       AV peak gradient 5       AV regurgitation pressure 1/2 time 516       AV Velocity Ratio 0.64       Bands       Baso #       Basophil %       BSA 1.85       BUN       BUN/CREAT RATIO       Calcium       Chloride       CHOL/HDLC Ratio       Cholesterol       CO2       Creatinine       Left Ventricle Relative Wall Thickness 0.59       Differential Type       Echo EF Estimated 35       eGFR if non        EF 25       Eos #       Eosinophil %       E wave deceleration time 117       FS 7       Glucose       HDL       Hematocrit       Hemoglobin       Hepatitis B Surface Ag       Immature Grans (Abs)       Immature Granulocytes       IVC ostium  1.1       IVS 1.24       LA size 2.50       LVOT area 2.8       LDL Calculated       LDL/HDL Ratio       LV Diastolic Volume 77.70       LV Diastolic Volume Index 41.55       LVIDd 4.18       LVIDs 3.90       LV mass 184.51       LV Mass Index 99       Left Ventricular Outflow Tract Mean Gradient 1.00       LVOT diameter 1.90       LVOT peak kiran 0.7       LVOT stroke volume 28.34       LVOT peak VTI 10.00       LV Systolic Volume 65.90       LV Systolic Volume Index 35.2       Lymph #       Lymph %       MCH       MCHC       MCV       Metamyelocytes       Mono #       Mono %       MPV       MV Peak E Kiran 0.73       Myelocytes       Neutrophils, Abs       Neutrophils Relative       Non-HDL Cholesterol       nRBC       NT-proBNP       NUCLEATED RBC ABSOLUTE       PLATELET MORPHOLOGY       Platelets       POC Base Excess       POC HCO3       POC PCO2       POC PH       POC PO2       POC SATURATED O2       Potassium       Posterior Wall 1.23       Right Atrial Pressure (from IVC) 3       RA Major Axis 3.30       RBC       RBC Morphology       RDW       RVDD 3.70       Segmented Neutrophils, Man %       Sodium       TAPSE 1.60       Triglycerides       Triscuspid Valve Regurgitation Peak Gradient 18       TR Max Kiran 2.10       Troponin I High Sensitivity       TV rest pulmonary artery pressure 21       VLDL Cholesterol Chandana       WBC             Significant Imaging: I have reviewed all pertinent imaging results/findings within the past 24 hours.  VQ lung scan low probability for PE      Assessment/Plan:      * COVID-19  Acute, variable  Symptomatic with shortness of breath and weakness  Covid positive on PCR  RA oxygen saturations 88%; 92-94% on 2L   ABGs unremarkable  Unable to receive Remdesivir due to kidney function  Baricitinib 1mg PO QD initiated   Decadron 4mg PO QD  Monitor in isolation    2/11: Day 2 of medicine regimens. 82-95% on 2L. Continue to monitor     Shortness of breath  Subacute, variable  Oxygen sat  room air 88%  Covid 19 positive  D-dimer 6.34; negative LE dopplers  History of cardiac stent 20+ years ago  ECHO, VQ lung scan pending   Check troponin, BNP    Elevated d-dimer  Acute, variable  D-dimer 6.34  BLE dopplers negative  Pending VQ lung scan  Heparin 5000u SQ Q8h for now   Monitor     2/11: Negative VQ lung scan; negative dopplers. Continue Heparin SQ Q8hrs    History of bilateral inguinal hernias  Acute, variable  Palpable, non-reducible mass to RLQ suspicious for inguinal hernia  CT abdomen/pelvis w/o pending  Therapy pending diagnostics    2/11: Bilateral inguinal hernias on CT AP; Reduced by surgery. No further issues at this time    Dehydration  Acute, variable  IVF bolus NS 500cc  IVF maintenance at 75ml/hr  Monitor BMP    2/11: No improvement with BUN/Creat with IVF; felt to be postrenal azotemia. Pending urology consult    EMETERIO (acute kidney injury)  Patient with acute kidney injury likely d/t IVVD/Dehydration Which is currently being treated. Labs reviewed- Renal function/electrolytes with Estimated Creatinine Clearance: 13.4 mL/min (A) (based on SCr of 3.59 mg/dL (H)). according to latest data. Monitor urine output and serial BMP and adjust therapy as needed. Avoid nephrotoxins and renally dose meds for GFR listed above.     2/11: No improvement with BUN/Creat; felt to be postrenal azotemia. Pending urology consult      UTI (urinary tract infection)  Acute, variable  WBCs 11-15, RBC TNTC, many bacteria; moderate leukocytes; positive nitrites  Initiated on Rocephin 1gm IV; Continue q24 hours  Monitor     2/11: Continue Rocephin 1gm IV q24. Monitor    Moderate chronic obstructive pulmonary disease  Chronic, variable  Denies home O2 use  CXR with chronic lung changes, no acute processes  Supplemental oxygen, PO steroids  Monitor       Hyperlipidemia  Chronic, variable  Continue previous medicine regimen of Zocor 80mg PO QD substituted for Lipitor 40mg PO QD, ASA 81mg PO QD  Obtain Lipid panel in  AM  Monitor      Lumbosacral spondylosis without myelopathy  Chronic, variable  Continue home medicine regimen of Norco 10-325mg PO Q8 hours  Monitor      Hypertension  Chronic, variable  Home medicine regimens of Coreg 6.25mg PO BID, Losartan 50mg PO QD, Nifedipine ER 30mg PO QD on med list; has not been filled recently  Resume Coreg 6.25mg PO BID due to tachycardia  Monitor BP trend    2/11: Variable BP trend; 90s-170s systolic. Continue Coreg 6.25mg PO BID at this time. Alterations to regimens as indicated      VTE Risk Mitigation (From admission, onward)         Ordered     heparin (porcine) injection 5,000 Units  Every 8 hours         02/10/22 1446                Discharge Planning   PARAS:      Code Status: Not on file   Is the patient medically ready for discharge?:     Reason for patient still in hospital (select all that apply): Treatment and Consult recommendations  Discharge Plan A: Home                  LEXIE Jeffrey  Department of Hospital Medicine   [address]

## 2022-02-11 NOTE — HOSPITAL COURSE
2/11: Seen by Surgery on yesterday for bilateral inguinal hernias and reduced. No complications noted. Pending consult with Urology for hydronephrosis/nephrolithiasis on CT AP. ECHO on yesterday with noted atrial fibrillation and EF of 25%. Cardizem resumed from previous medicine list, but no anticoagulant therapy noted and pharmacy states only filled pain medicine in the last 3 months. He has not had cardiology follow up. Previous Cardiologist Dr. Langley. Covid positive. Day 2 of Baricitinib. Pending VQ scan for elevated d-dimer     02/16--pt with no new concerns or issues current o2 sats are 97% on room air.   Is out of contagious window for Covid but instructed to wear a mask for the next 4 days.   Pt was not able to receive remdesivir due to renal function, D dimer elevated on admission, pt has received 6 days sub q anticoagulation and will be sent home on asa 81mg daily and Apixaban 2.5mg bid x's 14 days. Discussed safety precautions and bleeding precautions while on Eliquis.  Renal function has improved but is not at baseline, pt will need to follow up with pcp in one week with repeat BMP.  Will be sent home with  steroid taper, and  2 days of oral ABT for UTI; Received 5 days of IV Rocephin for complicated UTI.  Pt will follow up with Dr. Nickerson in 4 weeks.  Found to have Bilateral inguinal hernias, however, they were non tender and reducible, pt to follow up with general surgery as needed.  Requested to establish care with new cardiologist, will have appt scheduled with Dr. Anuj johnston's 3-4 weeks.  Has met max benefit from this hospitalization and is stable for discharge.

## 2022-02-11 NOTE — HPI
Mr. Lares is an 89 year old WM who presents to ED with complaint of generalized weakness and shortness of breath. He states he took his daughter to therapy appointment and shortly afterwards his daughter got sick. He states she was sick approximately 4-5 days and within the week he was sick. He states he has had an intermittent cough, shortness of breath, and weakness. He also endorses variable appetite for the last 3 days. Workup revealed he is Covid positive, dehydrated, and experiencing acute kidney injury.      Pertinent medical history of chronic back pain, constipation, hypertension, osteopenia, CAD, aortic regurgitation, pulmonary stenosis, COPD, HLD, vitamin D deficiency.    COVID-19  Acute, variable  Symptomatic with shortness of breath and weakness  Covid positive on PCR  RA oxygen saturations 88%; 92-94% on 2L   ABGs unremarkable  Unable to receive Remdesivir due to kidney function  Baricitinib 1mg PO QD initiated   Decadron 4mg PO QD  Monitor in isolation    Shortness of breath  Subacute, variable  Oxygen sat room air 88%  Covid 19 positive  D-dimer 6.34; negative LE dopplers  History of cardiac stent 20+ years ago  ECHO, VQ lung scan pending   Check troponin, BNP    EMETERIO (acute kidney injury)  Patient with acute kidney injury likely d/t IVVD/Dehydration Which is currently being treated. Labs reviewed- Renal function/electrolytes with Estimated Creatinine Clearance: 13.7 mL/min (A) (based on SCr of 3.53 mg/dL (H)). according to latest data. Monitor urine output and serial BMP and adjust therapy as needed. Avoid nephrotoxins and renally dose meds for GFR listed above.      UTI (urinary tract infection)  Acute, variable  WBCs 11-15, RBC TNTC, many bacteria; moderate leukocytes; positive nitrites  Initiated on Rocephin 1gm IV; Continue q24 hours  Monitor      I have seen the patient, reviewed the Nurse Practitioner's note.  I have personally interviewed and examined the patient at bedside and agree with  the findings.        Patient at very high risk for poor outcome with COVID.     If renal function improves, can start remdesivir.     Given EF of 25%, will decrease IV fluids to 100cc/hr.       Attestation signed by Chen Geiger MD at 2/10/2022 10:24 PM  --------------------------------------------------------------------------------------------------------------------------------------------------------  The above note is from the H&P of LEXIE Jeffrey - Department of [facility] Medicine on February 10, 2022 and cosigned by Chen Geiger MD.    No improvement in renal function with IV fluids.  Urology consulted for obstructive uropathy.  Consult cardiology if new onset heart failure.    ---------------------------------------------------------------------------------------------------------------------------------------------------------  The above note is from Dr. Chen Geiger this morning, February 11, 2022.    Dr. Geiger consulted us on Mr. Interiano for hydronephrosis, obstructive uropathy with elevated Creatinine.  His Cr on admission yesterday was 3.53 (Feb 10) and this morning it was 3.59.      My RNFA Wale Felix and I looked at his CT Scan and it showed: Multiple nonobstructing calculi present in both kidneys.  There is moderate left hydronephrosis and hydroureter extending into the pelvis.  There is a calculus at the left ureterovesical junction estimated 3 mm.  Moderate amount of left perinephric stranding present.  We measured the left distal ureteral stone at 5.5 mm x 4.5 mm which is a passable size stone. With the patient being Covid POSITIVE, we will treat conservatively with pain and nausea meds PRN.    A urine culture was taken while the patient was in the ER yesterday and the results are still pending.  Will follow urine culture and treat accordingly.

## 2022-02-11 NOTE — ASSESSMENT & PLAN NOTE
Known history of bilateral inguinal hernias.    They are reducible and nontender.    With the patient's age and being asymptomatic and reducible, a recommend nonsurgical management.  The only time we will consider repairing these would be if they were incarcerated and symptomatic.  Patient also agrees and states he does not want surgery unless he truly needs it.    No further surgical intervention at this time.  Medical management per primary.  Patient can follow up with Bony Adhikari, DO outpatient if he starts having any worsening symptoms

## 2022-02-11 NOTE — CONSULTS
Mr. Lares is an 89 y.o. WM, he is covid positive with PMHx of HTN, CAD, aortic regurgitation, pulmonary stenosis, COPD, HLD, chronic back pain and Vitamin D deficiency. Cardiology has been consulted for new onset heart failure. Echo showed EF 25%. Pt was dehydrated on admission with EMETERIO. Creatinine (3.6 today) has not improved with IV fluids. WBC is elevated at 18. Urology has been consulted by primary team. Given pt's advanced age and intercurrent illness, he is not a candidate for any invasive cardiac interventions at this time. Thank you for the consult. Please call if needed.

## 2022-02-12 PROBLEM — I42.9 CARDIOMYOPATHY: Status: ACTIVE | Noted: 2022-02-12

## 2022-02-12 LAB
HBV CORE AB SERPL QL IA: NEGATIVE
UA COMPLETE W REFLEX CULTURE PNL UR: ABNORMAL

## 2022-02-12 PROCEDURE — 25000003 PHARM REV CODE 250: Performed by: EMERGENCY MEDICINE

## 2022-02-12 PROCEDURE — 63600175 PHARM REV CODE 636 W HCPCS: Performed by: HOSPITALIST

## 2022-02-12 PROCEDURE — 25000242 PHARM REV CODE 250 ALT 637 W/ HCPCS: Performed by: HOSPITALIST

## 2022-02-12 PROCEDURE — 99232 SBSQ HOSP IP/OBS MODERATE 35: CPT | Mod: ,,, | Performed by: FAMILY MEDICINE

## 2022-02-12 PROCEDURE — 27000221 HC OXYGEN, UP TO 24 HOURS

## 2022-02-12 PROCEDURE — 63600175 PHARM REV CODE 636 W HCPCS: Performed by: NURSE PRACTITIONER

## 2022-02-12 PROCEDURE — 25000003 PHARM REV CODE 250: Performed by: NURSE PRACTITIONER

## 2022-02-12 PROCEDURE — C9399 UNCLASSIFIED DRUGS OR BIOLOG: HCPCS | Performed by: NURSE PRACTITIONER

## 2022-02-12 PROCEDURE — 99232 PR SUBSEQUENT HOSPITAL CARE,LEVL II: ICD-10-PCS | Mod: ,,, | Performed by: FAMILY MEDICINE

## 2022-02-12 PROCEDURE — 11000001 HC ACUTE MED/SURG PRIVATE ROOM

## 2022-02-12 RX ADMIN — BARICITINIB 1 MG: 1 TABLET, FILM COATED ORAL at 08:02

## 2022-02-12 RX ADMIN — CARVEDILOL 6.25 MG: 6.25 TABLET, FILM COATED ORAL at 04:02

## 2022-02-12 RX ADMIN — CARVEDILOL 6.25 MG: 6.25 TABLET, FILM COATED ORAL at 08:02

## 2022-02-12 RX ADMIN — DEXAMETHASONE 6 MG: 2 TABLET ORAL at 08:02

## 2022-02-12 RX ADMIN — HEPARIN SODIUM 5000 UNITS: 5000 INJECTION INTRAVENOUS; SUBCUTANEOUS at 02:02

## 2022-02-12 RX ADMIN — TAMSULOSIN HYDROCHLORIDE 0.4 MG: 0.4 CAPSULE ORAL at 08:02

## 2022-02-12 RX ADMIN — HEPARIN SODIUM 5000 UNITS: 5000 INJECTION INTRAVENOUS; SUBCUTANEOUS at 09:02

## 2022-02-12 RX ADMIN — ASPIRIN 81 MG: 81 TABLET, COATED ORAL at 08:02

## 2022-02-12 RX ADMIN — HYDROCODONE BITARTRATE AND ACETAMINOPHEN 1 TABLET: 7.5; 325 TABLET ORAL at 09:02

## 2022-02-12 RX ADMIN — CEFTRIAXONE SODIUM 1 G: 1 INJECTION, POWDER, FOR SOLUTION INTRAMUSCULAR; INTRAVENOUS at 08:02

## 2022-02-12 RX ADMIN — ATORVASTATIN CALCIUM 40 MG: 40 TABLET, FILM COATED ORAL at 09:02

## 2022-02-12 RX ADMIN — CHOLECALCIFEROL TAB 10 MCG (400 UNIT) 800 UNITS: 10 TAB at 08:02

## 2022-02-12 RX ADMIN — HEPARIN SODIUM 5000 UNITS: 5000 INJECTION INTRAVENOUS; SUBCUTANEOUS at 05:02

## 2022-02-12 NOTE — ASSESSMENT & PLAN NOTE
Evaluated by cardiology, age and renal function preclude invasive eval. Maintain euvolemic state.  EF25%

## 2022-02-12 NOTE — ASSESSMENT & PLAN NOTE
Improving SOB, decreased O2 requirement.   Covid positive on PCR  RA oxygen saturations 88%; 92-94% on 2L   Unable to receive Remdesivir due to kidney function  Baricitinib 1mg PO QD initiated   Decadron 6mg PO QD  Monitor in isolation

## 2022-02-12 NOTE — SUBJECTIVE & OBJECTIVE
Interval History: Feels like he is breathing better.     Review of Systems  Objective:     Vital Signs (Most Recent):  Temp: 97.4 °F (36.3 °C) (02/12/22 1100)  Pulse: 60 (02/12/22 1100)  Resp: 18 (02/12/22 1100)  BP: 129/84 (02/12/22 1100)  SpO2: 96 % (02/12/22 1100) Vital Signs (24h Range):  Temp:  [97.1 °F (36.2 °C)-97.6 °F (36.4 °C)] 97.4 °F (36.3 °C)  Pulse:  [59-76] 60  Resp:  [17-20] 18  SpO2:  [95 %-97 %] 96 %  BP: (103-146)/(56-96) 129/84     Weight: 68.1 kg (150 lb 2.1 oz)  Body mass index is 20.94 kg/m².  No intake or output data in the 24 hours ending 02/12/22 1453   Physical Exam  Vitals and nursing note reviewed.   Constitutional:       General: He is not in acute distress.     Appearance: He is not ill-appearing.   HENT:      Head: Normocephalic and atraumatic.      Nose: No congestion or rhinorrhea.      Mouth/Throat:      Mouth: Mucous membranes are dry.   Eyes:      Extraocular Movements: Extraocular movements intact.      Pupils: Pupils are equal, round, and reactive to light.   Cardiovascular:      Rate and Rhythm: Tachycardia present.      Pulses: Normal pulses.      Heart sounds: No murmur heard.      Pulmonary:      Effort: Pulmonary effort is normal.      Breath sounds: No wheezing or rhonchi.   Abdominal:      General: There is no distension.      Tenderness: There is no abdominal tenderness.      Hernia: A hernia is present.       Musculoskeletal:      Cervical back: Normal range of motion and neck supple.      Right lower leg: No edema.      Left lower leg: No edema.   Skin:     General: Skin is warm and dry.      Capillary Refill: Capillary refill takes 2 to 3 seconds.   Neurological:      General: No focal deficit present.      Mental Status: He is alert and oriented to person, place, and time.   Psychiatric:         Mood and Affect: Mood normal.         Behavior: Behavior normal.         Significant Labs:   All pertinent labs within the past 24 hours have been reviewed.  BMP:   Recent  Labs   Lab 02/11/22 0442   *      K 4.2      CO2 21   BUN 88*   CREATININE 3.59*   CALCIUM 8.2*     CBC:   Recent Labs   Lab 02/11/22 0442   WBC 18.21*   HGB 10.8*   HCT 32.4*          Significant Imaging: I have reviewed all pertinent imaging results/findings within the past 24 hours.

## 2022-02-12 NOTE — PLAN OF CARE
Problem: Adult Inpatient Plan of Care  Goal: Plan of Care Review  Outcome: Ongoing, Progressing  Goal: Patient-Specific Goal (Individualized)  Outcome: Ongoing, Progressing  Goal: Absence of Hospital-Acquired Illness or Injury  Outcome: Ongoing, Progressing  Goal: Optimal Comfort and Wellbeing  Outcome: Ongoing, Progressing  Goal: Readiness for Transition of Care  Outcome: Ongoing, Progressing     Problem: Fall Injury Risk  Goal: Absence of Fall and Fall-Related Injury  Outcome: Ongoing, Progressing     Problem: Fluid and Electrolyte Imbalance (Acute Kidney Injury/Impairment)  Goal: Fluid and Electrolyte Balance  Outcome: Ongoing, Progressing     Problem: Oral Intake Inadequate (Acute Kidney Injury/Impairment)  Goal: Optimal Nutrition Intake  Outcome: Ongoing, Progressing     Problem: Renal Function Impairment (Acute Kidney Injury/Impairment)  Goal: Effective Renal Function  Outcome: Ongoing, Progressing     Problem: Skin Injury Risk Increased  Goal: Skin Health and Integrity  Outcome: Ongoing, Progressing

## 2022-02-12 NOTE — PROGRESS NOTES
[facility], [place] - [address] [place] [address] Medicine  Progress Note    Patient Name: Coleman Lares  MRN: [id number]  Patient Class: IP- Inpatient   Admission Date: 2/10/2022  Length of Stay: 2 days  Attending Physician: Duane Kline Jr., MD  Primary Care Provider: Salinas Bird DO        Subjective:     Principal Problem:COVID-19        HPI:  Mr. Lares is an 89 year old WM who presents to ED with complaint of generalized weakness and shortness of breath. He states he took his daughter to therapy appointment and shortly afterwards his daughter got sick. He states she was sick approximately 4-5 days and within the week he was sick. He states he has had an intermittent cough, shortness of breath, and weakness. He also endorses variable appetite for the last 3 days. Workup revealed he is Covid positive, dehydrated, and experiencing acute kidney injury.     Pertinent medical history of chronic back pain, constipation, hypertension, osteopenia, CAD, aortic regurgitation, pulmonary stenosis, COPD, HLD, vitamin D deficiency.       Overview/Hospital Course:  2/11: Seen by Surgery on yesterday for bilateral inguinal hernias and reduced. No complications noted. Pending consult with Urology for hydronephrosis/nephrolithiasis on CT AP. ECHO on yesterday with noted atrial fibrillation and EF of 25%. Cardizem resumed from previous medicine list, but no anticoagulant therapy noted and pharmacy states only filled pain medicine in the last 3 months. He has not had cardiology follow up. Previous Cardiologist Dr. Langley. Covid positive. Day 2 of Baricitinib. Pending VQ scan for elevated d-dimer       Interval History: Feels like he is breathing better.     Review of Systems  Objective:     Vital Signs (Most Recent):  Temp: 97.4 °F (36.3 °C) (02/12/22 1100)  Pulse: 60 (02/12/22 1100)  Resp: 18 (02/12/22 1100)  BP: 129/84 (02/12/22 1100)  SpO2: 96 % (02/12/22 1100) Vital Signs (24h Range):  Temp:  [97.1 °F (36.2  °C)-97.6 °F (36.4 °C)] 97.4 °F (36.3 °C)  Pulse:  [59-76] 60  Resp:  [17-20] 18  SpO2:  [95 %-97 %] 96 %  BP: (103-146)/(56-96) 129/84     Weight: 68.1 kg (150 lb 2.1 oz)  Body mass index is 20.94 kg/m².  No intake or output data in the 24 hours ending 02/12/22 1453   Physical Exam  Vitals and nursing note reviewed.   Constitutional:       General: He is not in acute distress.     Appearance: He is not ill-appearing.   HENT:      Head: Normocephalic and atraumatic.      Nose: No congestion or rhinorrhea.      Mouth/Throat:      Mouth: Mucous membranes are dry.   Eyes:      Extraocular Movements: Extraocular movements intact.      Pupils: Pupils are equal, round, and reactive to light.   Cardiovascular:      Rate and Rhythm: Tachycardia present.      Pulses: Normal pulses.      Heart sounds: No murmur heard.      Pulmonary:      Effort: Pulmonary effort is normal.      Breath sounds: No wheezing or rhonchi.   Abdominal:      General: There is no distension.      Tenderness: There is no abdominal tenderness.      Hernia: A hernia is present.       Musculoskeletal:      Cervical back: Normal range of motion and neck supple.      Right lower leg: No edema.      Left lower leg: No edema.   Skin:     General: Skin is warm and dry.      Capillary Refill: Capillary refill takes 2 to 3 seconds.   Neurological:      General: No focal deficit present.      Mental Status: He is alert and oriented to person, place, and time.   Psychiatric:         Mood and Affect: Mood normal.         Behavior: Behavior normal.         Significant Labs:   All pertinent labs within the past 24 hours have been reviewed.  BMP:   Recent Labs   Lab 02/11/22 0442   *      K 4.2      CO2 21   BUN 88*   CREATININE 3.59*   CALCIUM 8.2*     CBC:   Recent Labs   Lab 02/11/22 0442   WBC 18.21*   HGB 10.8*   HCT 32.4*          Significant Imaging: I have reviewed all pertinent imaging results/findings within the past 24  hours.      Assessment/Plan:      * COVID-19  Improving SOB, decreased O2 requirement.   Covid positive on PCR  RA oxygen saturations 88%; 92-94% on 2L   Unable to receive Remdesivir due to kidney function  Baricitinib 1mg PO QD initiated   Decadron 6mg PO QD  Monitor in isolation        EMETERIO (acute kidney injury)  Urology consult appreciated.  No intervention at this time. F/u BUN and creatinine tomorrow.       Cardiomyopathy    Evaluated by cardiology, age and renal function preclude invasive eval. Maintain euvolemic state.  EF25%    Shortness of breath  Subacute, variable  Oxygen sat room air 88%  Covid 19 positive  D-dimer 6.34; negative LE dopplers  History of cardiac stent 20+ years ago  ECHO, VQ lung scan pending   Check troponin, BNP    Elevated d-dimer  Acute, variable  D-dimer 6.34  BLE dopplers negative  Pending VQ lung scan  Heparin 5000u SQ Q8h for now   Monitor     2/11: Negative VQ lung scan; negative dopplers. Continue Heparin SQ Q8hrs    History of bilateral inguinal hernias  Acute, variable  Palpable, non-reducible mass to RLQ suspicious for inguinal hernia  CT abdomen/pelvis w/o pending  Therapy pending diagnostics    2/11: Bilateral inguinal hernias on CT AP; Reduced by surgery. No further issues at this time    Dehydration  Acute, variable  IVF bolus NS 500cc  IVF maintenance at 75ml/hr  Monitor BMP    2/11: No improvement with BUN/Creat with IVF; felt to be postrenal azotemia. Pending urology consult    UTI (urinary tract infection)  Acute, variable  WBCs 11-15, RBC TNTC, many bacteria; moderate leukocytes; positive nitrites  Initiated on Rocephin 1gm IV; Continue q24 hours  Monitor     2/11: Continue Rocephin 1gm IV q24. Monitor    Moderate chronic obstructive pulmonary disease  Chronic, variable  Denies home O2 use  CXR with chronic lung changes, no acute processes  Supplemental oxygen, PO steroids  Monitor       Hyperlipidemia  Chronic, variable  Continue previous medicine regimen of Zocor  80mg PO QD substituted for Lipitor 40mg PO QD, ASA 81mg PO QD  Obtain Lipid panel in AM  Monitor      Lumbosacral spondylosis without myelopathy  Chronic, variable  Continue home medicine regimen of Norco 10-325mg PO Q8 hours  Monitor      Hypertension  Chronic, variable  Home medicine regimens of Coreg 6.25mg PO BID, Losartan 50mg PO QD, Nifedipine ER 30mg PO QD on med list; has not been filled recently  Resume Coreg 6.25mg PO BID due to tachycardia  Monitor BP trend    2/11: Variable BP trend; 90s-170s systolic. Continue Coreg 6.25mg PO BID at this time. Alterations to regimens as indicated      VTE Risk Mitigation (From admission, onward)         Ordered     heparin (porcine) injection 5,000 Units  Every 8 hours         02/10/22 1446                Discharge Planning   PARAS:      Code Status: Not on file   Is the patient medically ready for discharge?:     Reason for patient still in hospital (select all that apply): Treatment  Discharge Plan A: Home                  Duane Kline Jr, MD  Department of Hospital Medicine   [address]

## 2022-02-13 LAB
ANION GAP SERPL CALCULATED.3IONS-SCNC: 13 MMOL/L (ref 7–16)
BASOPHILS # BLD AUTO: 0.01 K/UL (ref 0–0.2)
BASOPHILS NFR BLD AUTO: 0.1 % (ref 0–1)
BUN SERPL-MCNC: 87 MG/DL (ref 7–18)
BUN/CREAT SERPL: 40 (ref 6–20)
CALCIUM SERPL-MCNC: 8.4 MG/DL (ref 8.5–10.1)
CHLORIDE SERPL-SCNC: 106 MMOL/L (ref 98–107)
CO2 SERPL-SCNC: 21 MMOL/L (ref 21–32)
CREAT SERPL-MCNC: 2.18 MG/DL (ref 0.7–1.3)
CRENATED CELLS: ABNORMAL
DIFFERENTIAL METHOD BLD: ABNORMAL
EOSINOPHIL # BLD AUTO: 0 K/UL (ref 0–0.5)
EOSINOPHIL NFR BLD AUTO: 0 % (ref 1–4)
ERYTHROCYTE [DISTWIDTH] IN BLOOD BY AUTOMATED COUNT: 13.5 % (ref 11.5–14.5)
GLUCOSE SERPL-MCNC: 225 MG/DL (ref 74–106)
HCT VFR BLD AUTO: 35 % (ref 40–54)
HGB BLD-MCNC: 11.7 G/DL (ref 13.5–18)
IMM GRANULOCYTES # BLD AUTO: 0.11 K/UL (ref 0–0.04)
IMM GRANULOCYTES NFR BLD: 0.9 % (ref 0–0.4)
LYMPHOCYTES # BLD AUTO: 0.48 K/UL (ref 1–4.8)
LYMPHOCYTES NFR BLD AUTO: 3.8 % (ref 27–41)
LYMPHOCYTES NFR BLD MANUAL: 5 % (ref 27–41)
MCH RBC QN AUTO: 29.5 PG (ref 27–31)
MCHC RBC AUTO-ENTMCNC: 33.4 G/DL (ref 32–36)
MCV RBC AUTO: 88.4 FL (ref 80–96)
MONOCYTES # BLD AUTO: 0.46 K/UL (ref 0–0.8)
MONOCYTES NFR BLD AUTO: 3.6 % (ref 2–6)
MONOCYTES NFR BLD MANUAL: 1 % (ref 2–6)
MPC BLD CALC-MCNC: 12.4 FL (ref 9.4–12.4)
NEUTROPHILS # BLD AUTO: 11.61 K/UL (ref 1.8–7.7)
NEUTROPHILS NFR BLD AUTO: 91.6 % (ref 53–65)
NEUTS BAND NFR BLD MANUAL: 2 % (ref 1–5)
NEUTS SEG NFR BLD MANUAL: 92 % (ref 50–62)
NRBC # BLD AUTO: 0 X10E3/UL
NRBC, AUTO (.00): 0 %
PLATELET # BLD AUTO: 131 K/UL (ref 150–400)
PLATELET MORPHOLOGY: ABNORMAL
POTASSIUM SERPL-SCNC: 4.4 MMOL/L (ref 3.5–5.1)
RBC # BLD AUTO: 3.96 M/UL (ref 4.6–6.2)
SODIUM SERPL-SCNC: 136 MMOL/L (ref 136–145)
WBC # BLD AUTO: 12.67 K/UL (ref 4.5–11)

## 2022-02-13 PROCEDURE — 80048 BASIC METABOLIC PNL TOTAL CA: CPT | Performed by: FAMILY MEDICINE

## 2022-02-13 PROCEDURE — 63600175 PHARM REV CODE 636 W HCPCS: Performed by: NURSE PRACTITIONER

## 2022-02-13 PROCEDURE — 25000242 PHARM REV CODE 250 ALT 637 W/ HCPCS: Performed by: HOSPITALIST

## 2022-02-13 PROCEDURE — 99232 SBSQ HOSP IP/OBS MODERATE 35: CPT | Mod: ,,, | Performed by: FAMILY MEDICINE

## 2022-02-13 PROCEDURE — 63600175 PHARM REV CODE 636 W HCPCS: Performed by: HOSPITALIST

## 2022-02-13 PROCEDURE — 99232 PR SUBSEQUENT HOSPITAL CARE,LEVL II: ICD-10-PCS | Mod: ,,, | Performed by: FAMILY MEDICINE

## 2022-02-13 PROCEDURE — 85025 COMPLETE CBC W/AUTO DIFF WBC: CPT | Performed by: FAMILY MEDICINE

## 2022-02-13 PROCEDURE — C9399 UNCLASSIFIED DRUGS OR BIOLOG: HCPCS | Performed by: NURSE PRACTITIONER

## 2022-02-13 PROCEDURE — 11000001 HC ACUTE MED/SURG PRIVATE ROOM

## 2022-02-13 PROCEDURE — 27000221 HC OXYGEN, UP TO 24 HOURS

## 2022-02-13 PROCEDURE — 25000003 PHARM REV CODE 250: Performed by: EMERGENCY MEDICINE

## 2022-02-13 PROCEDURE — 25000003 PHARM REV CODE 250: Performed by: NURSE PRACTITIONER

## 2022-02-13 PROCEDURE — 36415 COLL VENOUS BLD VENIPUNCTURE: CPT | Performed by: FAMILY MEDICINE

## 2022-02-13 RX ADMIN — CARVEDILOL 6.25 MG: 6.25 TABLET, FILM COATED ORAL at 09:02

## 2022-02-13 RX ADMIN — BARICITINIB 1 MG: 1 TABLET, FILM COATED ORAL at 09:02

## 2022-02-13 RX ADMIN — TAMSULOSIN HYDROCHLORIDE 0.4 MG: 0.4 CAPSULE ORAL at 09:02

## 2022-02-13 RX ADMIN — HEPARIN SODIUM 5000 UNITS: 5000 INJECTION INTRAVENOUS; SUBCUTANEOUS at 06:02

## 2022-02-13 RX ADMIN — CHOLECALCIFEROL TAB 10 MCG (400 UNIT) 800 UNITS: 10 TAB at 09:02

## 2022-02-13 RX ADMIN — HEPARIN SODIUM 5000 UNITS: 5000 INJECTION INTRAVENOUS; SUBCUTANEOUS at 10:02

## 2022-02-13 RX ADMIN — HYDROCODONE BITARTRATE AND ACETAMINOPHEN 1 TABLET: 7.5; 325 TABLET ORAL at 09:02

## 2022-02-13 RX ADMIN — CARVEDILOL 6.25 MG: 6.25 TABLET, FILM COATED ORAL at 06:02

## 2022-02-13 RX ADMIN — HYDROCODONE BITARTRATE AND ACETAMINOPHEN 1 TABLET: 7.5; 325 TABLET ORAL at 10:02

## 2022-02-13 RX ADMIN — ASPIRIN 81 MG: 81 TABLET, COATED ORAL at 09:02

## 2022-02-13 RX ADMIN — ATORVASTATIN CALCIUM 40 MG: 40 TABLET, FILM COATED ORAL at 10:02

## 2022-02-13 RX ADMIN — HEPARIN SODIUM 5000 UNITS: 5000 INJECTION INTRAVENOUS; SUBCUTANEOUS at 02:02

## 2022-02-13 RX ADMIN — CEFTRIAXONE SODIUM 1 G: 1 INJECTION, POWDER, FOR SOLUTION INTRAMUSCULAR; INTRAVENOUS at 09:02

## 2022-02-13 RX ADMIN — DEXAMETHASONE 6 MG: 2 TABLET ORAL at 09:02

## 2022-02-13 NOTE — SUBJECTIVE & OBJECTIVE
Interval History: Feeling better. Eating better.    Review of Systems   Respiratory: Positive for cough. Negative for shortness of breath.    Cardiovascular: Negative for chest pain.   Gastrointestinal: Negative for abdominal pain.     Objective:     Vital Signs (Most Recent):  Temp: 97.7 °F (36.5 °C) (02/13/22 1600)  Pulse: 64 (02/13/22 1600)  Resp: 18 (02/13/22 1600)  BP: (!) 152/93 (02/13/22 1600)  SpO2: 95 % (02/13/22 1600) Vital Signs (24h Range):  Temp:  [97.5 °F (36.4 °C)-97.9 °F (36.6 °C)] 97.7 °F (36.5 °C)  Pulse:  [61-70] 64  Resp:  [18-20] 18  SpO2:  [95 %-97 %] 95 %  BP: (143-164)/(63-97) 152/93     Weight: 68.1 kg (150 lb 2.1 oz)  Body mass index is 20.94 kg/m².  No intake or output data in the 24 hours ending 02/13/22 1713   Physical Exam  Vitals reviewed.   Constitutional:       Appearance: He is not toxic-appearing.   HENT:      Head: Normocephalic.   Cardiovascular:      Rate and Rhythm: Normal rate and regular rhythm.      Heart sounds: Normal heart sounds.   Pulmonary:      Effort: Pulmonary effort is normal. No respiratory distress.      Breath sounds: Normal breath sounds. No wheezing.   Abdominal:      General: Abdomen is flat. Bowel sounds are normal.      Palpations: Abdomen is soft.   Neurological:      Mental Status: He is alert.         Significant Labs:   All pertinent labs within the past 24 hours have been reviewed.  BMP:   Recent Labs   Lab 02/13/22  0933   *      K 4.4      CO2 21   BUN 87*   CREATININE 2.18*   CALCIUM 8.4*     CBC:   Recent Labs   Lab 02/13/22  0934   WBC 12.67*   HGB 11.7*   HCT 35.0*   *       Significant Imaging: I have reviewed all pertinent imaging results/findings within the past 24 hours.

## 2022-02-13 NOTE — PROGRESS NOTES
[facility] - [address] [place] [address] Medicine  Progress Note    Patient Name: Coleman Lares  MRN: [id number]  Patient Class: IP- Inpatient   Admission Date: 2/10/2022  Length of Stay: 3 days  Attending Physician: Duane Kline Jr., MD  Primary Care Provider: Salinas Bird DO        Subjective:     Principal Problem:COVID-19        HPI:  Mr. Lares is an 89 year old WM who presents to ED with complaint of generalized weakness and shortness of breath. He states he took his daughter to therapy appointment and shortly afterwards his daughter got sick. He states she was sick approximately 4-5 days and within the week he was sick. He states he has had an intermittent cough, shortness of breath, and weakness. He also endorses variable appetite for the last 3 days. Workup revealed he is Covid positive, dehydrated, and experiencing acute kidney injury.     Pertinent medical history of chronic back pain, constipation, hypertension, osteopenia, CAD, aortic regurgitation, pulmonary stenosis, COPD, HLD, vitamin D deficiency.       Overview/Hospital Course:  2/11: Seen by Surgery on yesterday for bilateral inguinal hernias and reduced. No complications noted. Pending consult with Urology for hydronephrosis/nephrolithiasis on CT AP. ECHO on yesterday with noted atrial fibrillation and EF of 25%. Cardizem resumed from previous medicine list, but no anticoagulant therapy noted and pharmacy states only filled pain medicine in the last 3 months. He has not had cardiology follow up. Previous Cardiologist Dr. Langley. Covid positive. Day 2 of Baricitinib. Pending VQ scan for elevated d-dimer       Interval History: Feeling better. Eating better.    Review of Systems   Respiratory: Positive for cough. Negative for shortness of breath.    Cardiovascular: Negative for chest pain.   Gastrointestinal: Negative for abdominal pain.     Objective:     Vital Signs (Most Recent):  Temp: 97.7 °F (36.5 °C) (02/13/22  1600)  Pulse: 64 (02/13/22 1600)  Resp: 18 (02/13/22 1600)  BP: (!) 152/93 (02/13/22 1600)  SpO2: 95 % (02/13/22 1600) Vital Signs (24h Range):  Temp:  [97.5 °F (36.4 °C)-97.9 °F (36.6 °C)] 97.7 °F (36.5 °C)  Pulse:  [61-70] 64  Resp:  [18-20] 18  SpO2:  [95 %-97 %] 95 %  BP: (143-164)/(63-97) 152/93     Weight: 68.1 kg (150 lb 2.1 oz)  Body mass index is 20.94 kg/m².  No intake or output data in the 24 hours ending 02/13/22 1713   Physical Exam  Vitals reviewed.   Constitutional:       Appearance: He is not toxic-appearing.   HENT:      Head: Normocephalic.   Cardiovascular:      Rate and Rhythm: Normal rate and regular rhythm.      Heart sounds: Normal heart sounds.   Pulmonary:      Effort: Pulmonary effort is normal. No respiratory distress.      Breath sounds: Normal breath sounds. No wheezing.   Abdominal:      General: Abdomen is flat. Bowel sounds are normal.      Palpations: Abdomen is soft.   Neurological:      Mental Status: He is alert.         Significant Labs:   All pertinent labs within the past 24 hours have been reviewed.  BMP:   Recent Labs   Lab 02/13/22  0933   *      K 4.4      CO2 21   BUN 87*   CREATININE 2.18*   CALCIUM 8.4*     CBC:   Recent Labs   Lab 02/13/22  0934   WBC 12.67*   HGB 11.7*   HCT 35.0*   *       Significant Imaging: I have reviewed all pertinent imaging results/findings within the past 24 hours.      Assessment/Plan:      * COVID-19  Improving SOB, decreased O2 requirement.   Covid positive on PCR  RA oxygen saturations 88%; 92-94% on 2L   Unable to receive Remdesivir due to kidney function  Baricitinib 1mg PO QD initiated   Decadron 6mg PO QD  Monitor in isolation        EMETERIO (acute kidney injury)  Renal function improving. Monitor.     Cardiomyopathy    Evaluated by cardiology, age and renal function preclude invasive eval. Maintain euvolemic state.  EF25%    Shortness of breath  Subacute, variable  Oxygen sat room air 88%  Covid 19  positive  D-dimer 6.34; negative LE dopplers  History of cardiac stent 20+ years ago  ECHO, VQ lung scan pending   Check troponin, BNP    Elevated d-dimer  Acute, variable  D-dimer 6.34  BLE dopplers negative  Pending VQ lung scan  Heparin 5000u SQ Q8h for now   Monitor     2/11: Negative VQ lung scan; negative dopplers. Continue Heparin SQ Q8hrs    History of bilateral inguinal hernias  Acute, variable  Palpable, non-reducible mass to RLQ suspicious for inguinal hernia  CT abdomen/pelvis w/o pending  Therapy pending diagnostics    2/11: Bilateral inguinal hernias on CT AP; Reduced by surgery. No further issues at this time    Dehydration  Acute, variable  IVF bolus NS 500cc  IVF maintenance at 75ml/hr  Monitor BMP    2/11: No improvement with BUN/Creat with IVF; felt to be postrenal azotemia. Pending urology consult    UTI (urinary tract infection)  Acute, variable  WBCs 11-15, RBC TNTC, many bacteria; moderate leukocytes; positive nitrites  Initiated on Rocephin 1gm IV; Continue q24 hours  Monitor     2/11: Continue Rocephin 1gm IV q24. Monitor    Moderate chronic obstructive pulmonary disease  Chronic, variable  Denies home O2 use  CXR with chronic lung changes, no acute processes  Supplemental oxygen, PO steroids  Monitor       Hyperlipidemia  Chronic, variable  Continue previous medicine regimen of Zocor 80mg PO QD substituted for Lipitor 40mg PO QD, ASA 81mg PO QD  Obtain Lipid panel in AM  Monitor      Lumbosacral spondylosis without myelopathy  Chronic, variable  Continue home medicine regimen of Norco 10-325mg PO Q8 hours  Monitor      Hypertension  Chronic, variable  Home medicine regimens of Coreg 6.25mg PO BID, Losartan 50mg PO QD, Nifedipine ER 30mg PO QD on med list; has not been filled recently  Resume Coreg 6.25mg PO BID due to tachycardia  Monitor BP trend    2/11: Variable BP trend; 90s-170s systolic. Continue Coreg 6.25mg PO BID at this time. Alterations to regimens as indicated      VTE Risk  Mitigation (From admission, onward)         Ordered     heparin (porcine) injection 5,000 Units  Every 8 hours         02/10/22 1446                Discharge Planning   PARAS:      Code Status: Not on file   Is the patient medically ready for discharge?:     Reason for patient still in hospital (select all that apply): Treatment  Discharge Plan A: Home                  Duane Kline Jr, MD  Department of Hospital Medicine   [address]

## 2022-02-14 PROBLEM — R79.89 ELEVATED D-DIMER: Status: RESOLVED | Noted: 2022-02-10 | Resolved: 2022-02-14

## 2022-02-14 LAB
GLUCOSE SERPL-MCNC: 151 MG/DL (ref 70–105)
MITOGEN MINUS NIL RESULT, TB: 0.5
NIL RESULT, TB: 0.05
QUANTIFERON-TB GOLD PLUS RESULT: ABNORMAL
TB1 AG MINUS NIL RESULT: 0.01
TB2 AG MINUS NIL RESULT: 0

## 2022-02-14 PROCEDURE — 25000003 PHARM REV CODE 250: Performed by: HOSPITALIST

## 2022-02-14 PROCEDURE — 11000001 HC ACUTE MED/SURG PRIVATE ROOM

## 2022-02-14 PROCEDURE — 25000003 PHARM REV CODE 250: Performed by: NURSE PRACTITIONER

## 2022-02-14 PROCEDURE — C9399 UNCLASSIFIED DRUGS OR BIOLOG: HCPCS | Performed by: NURSE PRACTITIONER

## 2022-02-14 PROCEDURE — 25000242 PHARM REV CODE 250 ALT 637 W/ HCPCS: Performed by: HOSPITALIST

## 2022-02-14 PROCEDURE — 25000003 PHARM REV CODE 250: Performed by: EMERGENCY MEDICINE

## 2022-02-14 PROCEDURE — 99232 PR SUBSEQUENT HOSPITAL CARE,LEVL II: ICD-10-PCS | Mod: ,,, | Performed by: FAMILY MEDICINE

## 2022-02-14 PROCEDURE — 97161 PT EVAL LOW COMPLEX 20 MIN: CPT

## 2022-02-14 PROCEDURE — 99232 SBSQ HOSP IP/OBS MODERATE 35: CPT | Mod: ,,, | Performed by: FAMILY MEDICINE

## 2022-02-14 PROCEDURE — 82962 GLUCOSE BLOOD TEST: CPT

## 2022-02-14 PROCEDURE — 94761 N-INVAS EAR/PLS OXIMETRY MLT: CPT

## 2022-02-14 PROCEDURE — 63600175 PHARM REV CODE 636 W HCPCS: Performed by: NURSE PRACTITIONER

## 2022-02-14 PROCEDURE — 97165 OT EVAL LOW COMPLEX 30 MIN: CPT

## 2022-02-14 PROCEDURE — 27000221 HC OXYGEN, UP TO 24 HOURS

## 2022-02-14 PROCEDURE — 63600175 PHARM REV CODE 636 W HCPCS: Performed by: HOSPITALIST

## 2022-02-14 RX ORDER — GUAIFENESIN/DEXTROMETHORPHAN 100-10MG/5
10 SYRUP ORAL EVERY 6 HOURS PRN
Status: DISCONTINUED | OUTPATIENT
Start: 2022-02-14 | End: 2022-02-16 | Stop reason: HOSPADM

## 2022-02-14 RX ORDER — SIMETHICONE 80 MG
1 TABLET,CHEWABLE ORAL 3 TIMES DAILY PRN
Status: DISCONTINUED | OUTPATIENT
Start: 2022-02-14 | End: 2022-02-16 | Stop reason: HOSPADM

## 2022-02-14 RX ORDER — TRAZODONE HYDROCHLORIDE 50 MG/1
50 TABLET ORAL NIGHTLY PRN
Status: DISCONTINUED | OUTPATIENT
Start: 2022-02-14 | End: 2022-02-16 | Stop reason: HOSPADM

## 2022-02-14 RX ORDER — BISACODYL 10 MG
10 SUPPOSITORY, RECTAL RECTAL DAILY PRN
Status: DISCONTINUED | OUTPATIENT
Start: 2022-02-14 | End: 2022-02-16 | Stop reason: HOSPADM

## 2022-02-14 RX ORDER — TALC
6 POWDER (GRAM) TOPICAL NIGHTLY PRN
Status: DISCONTINUED | OUTPATIENT
Start: 2022-02-14 | End: 2022-02-16 | Stop reason: HOSPADM

## 2022-02-14 RX ORDER — OXYCODONE HYDROCHLORIDE 5 MG/1
5 TABLET ORAL EVERY 6 HOURS PRN
Status: DISCONTINUED | OUTPATIENT
Start: 2022-02-14 | End: 2022-02-16 | Stop reason: HOSPADM

## 2022-02-14 RX ORDER — ONDANSETRON 2 MG/ML
8 INJECTION INTRAMUSCULAR; INTRAVENOUS EVERY 6 HOURS PRN
Status: DISCONTINUED | OUTPATIENT
Start: 2022-02-14 | End: 2022-02-16 | Stop reason: HOSPADM

## 2022-02-14 RX ORDER — ACETAMINOPHEN 500 MG
1000 TABLET ORAL EVERY 6 HOURS PRN
Status: DISCONTINUED | OUTPATIENT
Start: 2022-02-14 | End: 2022-02-16 | Stop reason: HOSPADM

## 2022-02-14 RX ADMIN — CARVEDILOL 6.25 MG: 6.25 TABLET, FILM COATED ORAL at 08:02

## 2022-02-14 RX ADMIN — CHOLECALCIFEROL TAB 10 MCG (400 UNIT) 800 UNITS: 10 TAB at 08:02

## 2022-02-14 RX ADMIN — BARICITINIB 1 MG: 1 TABLET, FILM COATED ORAL at 08:02

## 2022-02-14 RX ADMIN — CEFTRIAXONE SODIUM 1 G: 1 INJECTION, POWDER, FOR SOLUTION INTRAMUSCULAR; INTRAVENOUS at 08:02

## 2022-02-14 RX ADMIN — TAMSULOSIN HYDROCHLORIDE 0.4 MG: 0.4 CAPSULE ORAL at 08:02

## 2022-02-14 RX ADMIN — HYDROCODONE BITARTRATE AND ACETAMINOPHEN 1 TABLET: 7.5; 325 TABLET ORAL at 08:02

## 2022-02-14 RX ADMIN — ATORVASTATIN CALCIUM 40 MG: 40 TABLET, FILM COATED ORAL at 08:02

## 2022-02-14 RX ADMIN — CARVEDILOL 6.25 MG: 6.25 TABLET, FILM COATED ORAL at 05:02

## 2022-02-14 RX ADMIN — HYDROCODONE BITARTRATE AND ACETAMINOPHEN 1 TABLET: 7.5; 325 TABLET ORAL at 09:02

## 2022-02-14 RX ADMIN — HEPARIN SODIUM 5000 UNITS: 5000 INJECTION INTRAVENOUS; SUBCUTANEOUS at 05:02

## 2022-02-14 RX ADMIN — MELATONIN 6 MG: at 08:02

## 2022-02-14 RX ADMIN — HEPARIN SODIUM 5000 UNITS: 5000 INJECTION INTRAVENOUS; SUBCUTANEOUS at 01:02

## 2022-02-14 RX ADMIN — ASPIRIN 81 MG: 81 TABLET, COATED ORAL at 08:02

## 2022-02-14 RX ADMIN — DEXAMETHASONE 6 MG: 2 TABLET ORAL at 08:02

## 2022-02-14 NOTE — ASSESSMENT & PLAN NOTE
Improving SOB, decreased O2 requirement.   Covid positive on PCR  RA oxygen saturations 88%; 92-94% on 2L   Unable to receive Remdesivir due to kidney function  Baricitinib 1mg PO QD initiated, will complete 14 day course if able.  Decadron 6mg PO QD, started 2/11  Monitor in isolation  PT/OT

## 2022-02-14 NOTE — PLAN OF CARE
Problem: Physical Therapy Goal  Goal: Physical Therapy Goal  Description: Short term goals:  1. Bed to chair transfer with Modified [place] using Rolling Walker  2. Gait  x 150 feet with Modified [place] using Rolling Walker.   3. Lower extremity exercise program x30 reps per handout, with independence    Long term goals:  1. Gait  x 350 feet with Modified [place] using Rolling Walker  2. Pt will return home to [facility] with all mobility.     Outcome: Ongoing, Progressing

## 2022-02-14 NOTE — PT/OT/SLP EVAL
"Physical Therapy Evaluation    Patient Name:  Coleman Lares   MRN:  [id number]    Recommendations:     Discharge Recommendations:  home health PT   Discharge Equipment Recommendations: none   Barriers to discharge: Decreased caregiver support    Assessment:     Coleman Lares is a 89 y.o. male admitted with a medical diagnosis of COVID-19.  He presents with the following impairments/functional limitations:  weakness,impaired functional mobilty,gait instability Pt presents with generalized weakness from extended bedrest secondary to Covid infection. He demonstrates good use of rolling walker for short distance ambulation but is slightly impulsive. PT to follow to increase safety with functional mobility prior to return home.    Rehab Prognosis: Good; patient would benefit from acute skilled PT services to address these deficits and reach maximum level of function.    Recent Surgery: * No surgery found *      Plan:     During this hospitalization, patient to be seen 5 x/week to address the identified rehab impairments via gait training,therapeutic exercises,therapeutic activities and progress toward the following goals:    · Plan of Care Expires:  03/14/22    Subjective     Chief Complaint: weakness  Patient/Family Comments/goals: "I'm ready to get up"   Pain/Comfort:  · Pain Rating 1: 0/10  · Pain Rating Post-Intervention 1: 0/10    Patients cultural, spiritual, Bahai conflicts given the current situation: no    Living Environment:  Pt lives at home alone but has family next door. Has a ramp  Prior to admission, patients level of function was independent with rollator.  Equipment used at home: wheelchair,rollator.  DME owned (not currently used): bedside commode and shower chair.  Upon discharge, patient will have assistance from family.    Objective:     Communicated with LASHONDA Mei LPN prior to session.  Patient found supine with peripheral IV,oxygen  upon PT entry to room.    General Precautions: Standard, " fall,droplet,contact,airborne   Orthopedic Precautions:N/A   Braces: N/A  Respiratory Status: oxygen set to 2 L/min on wall but removed by pt    Exams:  · Cognitive Exam:  Patient is oriented to Person, Place, Time and Situation  · Gross Motor Coordination:  WFL  · RLE ROM: WFL  · RLE Strength: WFL  · LLE ROM: WFL  · LLE Strength: WFL    Functional Mobility:  · Bed Mobility:     · Scooting: stand by assistance  · Supine to Sit: stand by assistance  · Transfers:     · Sit to Stand:  contact guard assistance with hand-held assist and rolling walker  · Bed to Chair: contact guard assistance with  hand-held assist and rolling walker  using  Step Transfer  · Gait: 40 ft CGA with RW, 40 ft CGA with HHA, axial flexion, reciprocal pattern  · Balance: good    Therapeutic Activities and Exercises:  · Pt educated on PT role/POC.   · Importance of OOB activity with staff assistance.  · Importance of sitting up in the chair throughout the day as tolerated, especially for meals   · Safety during functional t/f and mobility with use of rolling walker   · Multiple self-care tasks/functional mobility completed- assistance level noted above   · All questions/concerns answered within PT scope of practice      AM-PAC 6 CLICK MOBILITY  Total Score:20     Patient left up in chair with all lines intact and call button in reach.    GOALS:   Multidisciplinary Problems     Physical Therapy Goals        Problem: Physical Therapy Goal    Goal Priority Disciplines Outcome Goal Variances Interventions   Physical Therapy Goal     PT, PT/OT Ongoing, Progressing     Description: Short term goals:  1. Bed to chair transfer with Modified [place] using Rolling Walker  2. Gait  x 150 feet with Modified [place] using Rolling Walker.   3. Lower extremity exercise program x30 reps per handout, with independence    Long term goals:  1. Gait  x 350 feet with Modified [place] using Rolling Walker  2. Pt will return home to [facility] with all  mobility.                      History:     Past Medical History:   Diagnosis Date    Aortic regurgitation     Back pain     CAD (coronary artery disease)     Chronic back pain     Constipation     COPD (chronic obstructive pulmonary disease)     Generalized OA     Hyperlipidemia     Hypertension     Osteopenia determined by x-ray     Pulmonary stenosis        Past Surgical History:   Procedure Laterality Date    CORONARY ANGIOPLASTY WITH STENT PLACEMENT      FOOT SURGERY  2021    Dr Jackson    HIP SURGERY      LITHOTRIPSY         Time Tracking:     PT Received On: 02/14/22  PT Start Time: 1415     PT Stop Time: 1440  PT Total Time (min): 25 min     Billable Minutes: Evaluation low complexity      02/14/2022

## 2022-02-14 NOTE — ASSESSMENT & PLAN NOTE
Evaluated by cardiology, age and renal function preclude invasive eval. Maintain euvolemic state.  EF25%.  Currently seems compensated.

## 2022-02-14 NOTE — SUBJECTIVE & OBJECTIVE
Interval History:  Continues to improve. Notes constipation.  Eating better.     Review of Systems   Respiratory: Negative for shortness of breath.    Cardiovascular: Negative for chest pain.   Gastrointestinal: Negative for abdominal pain, nausea and vomiting.     Objective:     Vital Signs (Most Recent):  Temp: 97.9 °F (36.6 °C) (02/14/22 1112)  Pulse: 67 (02/14/22 1112)  Resp: 18 (02/14/22 1112)  BP: (!) 164/98 (02/14/22 1112)  SpO2: 97 % (02/14/22 1112) Vital Signs (24h Range):  Temp:  [97.7 °F (36.5 °C)-97.9 °F (36.6 °C)] 97.9 °F (36.6 °C)  Pulse:  [61-70] 67  Resp:  [16-98] 18  SpO2:  [95 %-98 %] 97 %  BP: (151-164)/(90-98) 164/98     Weight: 68.1 kg (150 lb 2.1 oz)  Body mass index is 20.94 kg/m².  No intake or output data in the 24 hours ending 02/14/22 1357   Physical Exam  Vitals reviewed.   Constitutional:       Appearance: He is not toxic-appearing.   HENT:      Head: Normocephalic.   Eyes:      General: No scleral icterus.  Cardiovascular:      Rate and Rhythm: Normal rate and regular rhythm.      Heart sounds: Normal heart sounds.   Pulmonary:      Effort: Pulmonary effort is normal.      Breath sounds: Normal breath sounds. No rhonchi or rales.   Abdominal:      General: Abdomen is flat. There is no distension.      Palpations: Abdomen is soft.      Tenderness: There is no abdominal tenderness.   Neurological:      Mental Status: He is alert.         Significant Labs:   All pertinent labs within the past 24 hours have been reviewed.  BMP:   Recent Labs   Lab 02/13/22  0933   *      K 4.4      CO2 21   BUN 87*   CREATININE 2.18*   CALCIUM 8.4*     CBC:   Recent Labs   Lab 02/13/22  0934   WBC 12.67*   HGB 11.7*   HCT 35.0*   *       Significant Imaging: I have reviewed all pertinent imaging results/findings within the past 24 hours.

## 2022-02-14 NOTE — PROGRESS NOTES
Mr. Lares was seen this morning.  His CT Scan from February 10, 2022 showed: Multiple nonobstructing calculi present in both kidneys. There is moderate left hydronephrosis and hydroureter extending into the pelvis.  There is a calculus at the left ureterovesical junction estimated 3 mm.  Moderate amount of left perinephric stranding present.  We measured the left distal ureteral stone at 5.5 mm x 4.5 mm which is a passable size stone.     Dr. Nickerson believes that the left ureteral stone is unlikely contributing significantly to the patient's azotemia. Dr. Nickerson mentioned if patient improves as far as COVID is concerned and becomes symptomatic from the left ureteral stone we would consider ureteroscopy but Dr. Nickerson does not think that would be helpful to the patient at this time.  Still recommend conservative treatment only.  We need to strain his urine to make sure he does not pass the stone.     The urine culture taken while the patient was in the ER on February 10, 2022 grew >100,000 gram negative bacilli.  Isolate grew: E-Coli sensitive to Rocephin and patient is receiving that at 1 gm IV daily dosing.     Dr. Nickerson will continue to follow with you.    Gus Felix, [facility]

## 2022-02-15 PROCEDURE — 97530 THERAPEUTIC ACTIVITIES: CPT

## 2022-02-15 PROCEDURE — 99232 PR SUBSEQUENT HOSPITAL CARE,LEVL II: ICD-10-PCS | Mod: ,,, | Performed by: FAMILY MEDICINE

## 2022-02-15 PROCEDURE — 94761 N-INVAS EAR/PLS OXIMETRY MLT: CPT

## 2022-02-15 PROCEDURE — 63600175 PHARM REV CODE 636 W HCPCS: Performed by: NURSE PRACTITIONER

## 2022-02-15 PROCEDURE — 11000001 HC ACUTE MED/SURG PRIVATE ROOM

## 2022-02-15 PROCEDURE — 97535 SELF CARE MNGMENT TRAINING: CPT

## 2022-02-15 PROCEDURE — 63600175 PHARM REV CODE 636 W HCPCS: Performed by: HOSPITALIST

## 2022-02-15 PROCEDURE — 25000003 PHARM REV CODE 250: Performed by: NURSE PRACTITIONER

## 2022-02-15 PROCEDURE — 25000003 PHARM REV CODE 250: Performed by: HOSPITALIST

## 2022-02-15 PROCEDURE — C9399 UNCLASSIFIED DRUGS OR BIOLOG: HCPCS | Performed by: NURSE PRACTITIONER

## 2022-02-15 PROCEDURE — 99232 SBSQ HOSP IP/OBS MODERATE 35: CPT | Mod: ,,, | Performed by: FAMILY MEDICINE

## 2022-02-15 PROCEDURE — 25000242 PHARM REV CODE 250 ALT 637 W/ HCPCS: Performed by: HOSPITALIST

## 2022-02-15 RX ADMIN — OXYCODONE HYDROCHLORIDE 5 MG: 5 TABLET ORAL at 04:02

## 2022-02-15 RX ADMIN — CARVEDILOL 6.25 MG: 6.25 TABLET, FILM COATED ORAL at 08:02

## 2022-02-15 RX ADMIN — ASPIRIN 81 MG: 81 TABLET, COATED ORAL at 08:02

## 2022-02-15 RX ADMIN — CEFTRIAXONE SODIUM 1 G: 1 INJECTION, POWDER, FOR SOLUTION INTRAMUSCULAR; INTRAVENOUS at 08:02

## 2022-02-15 RX ADMIN — TAMSULOSIN HYDROCHLORIDE 0.4 MG: 0.4 CAPSULE ORAL at 08:02

## 2022-02-15 RX ADMIN — CHOLECALCIFEROL TAB 10 MCG (400 UNIT) 800 UNITS: 10 TAB at 08:02

## 2022-02-15 RX ADMIN — OXYCODONE HYDROCHLORIDE 5 MG: 5 TABLET ORAL at 09:02

## 2022-02-15 RX ADMIN — DEXAMETHASONE 6 MG: 2 TABLET ORAL at 08:02

## 2022-02-15 RX ADMIN — OXYCODONE HYDROCHLORIDE 5 MG: 5 TABLET ORAL at 06:02

## 2022-02-15 RX ADMIN — HEPARIN SODIUM 5000 UNITS: 5000 INJECTION INTRAVENOUS; SUBCUTANEOUS at 09:02

## 2022-02-15 RX ADMIN — HEPARIN SODIUM 5000 UNITS: 5000 INJECTION INTRAVENOUS; SUBCUTANEOUS at 05:02

## 2022-02-15 RX ADMIN — CARVEDILOL 6.25 MG: 6.25 TABLET, FILM COATED ORAL at 04:02

## 2022-02-15 RX ADMIN — ATORVASTATIN CALCIUM 40 MG: 40 TABLET, FILM COATED ORAL at 09:02

## 2022-02-15 RX ADMIN — BARICITINIB 1 MG: 1 TABLET, FILM COATED ORAL at 08:02

## 2022-02-15 NOTE — PT/OT/SLP PROGRESS
Physical Therapy Treatment    Patient Name:  Coleman Lares   MRN:  [id number]    Recommendations:     Discharge Recommendations:  home health PT   Discharge Equipment Recommendations: none   Barriers to discharge: None    Assessment:     Coleman Lares is a 89 y.o. male admitted with a medical diagnosis of COVID-19.  He presents with the following impairments/functional limitations:  weakness,impaired functional mobilty,gait instability Pt doing well with mobility. Should be able to return home with family..    Rehab Prognosis: Good; patient would benefit from acute skilled PT services to address these deficits and reach maximum level of function.    Recent Surgery: * No surgery found *      Plan:     During this hospitalization, patient to be seen 5 x/week to address the identified rehab impairments via gait training,therapeutic exercises,therapeutic activities and progress toward the following goals:    · Plan of Care Expires:  03/14/22    Subjective     Chief Complaint: weakness  Patient/Family Comments/goals: Pt agreeable to PT  Pain/Comfort:  · Pain Rating Post-Intervention 1: 0/10      Objective:     Communicated with LASHONDA Patel RN prior to session.  Patient found supine with peripheral IV upon PT entry to room.     General Precautions: Standard, fall,droplet,contact,airborne   Orthopedic Precautions:N/A   Braces: N/A  Respiratory Status: Room air     Functional Mobility:  · Bed Mobility:     · Scooting: supervision  · Supine to Sit: supervision  · Transfers:     · Sit to Stand:  stand by assistance with rolling walker  · Bed to Chair: stand by assistance with  rolling walker  using  Step Transfer  · Gait: 40 ft SBA with RW, reciprocal pattern  · Balance: good      AM-PAC 6 CLICK MOBILITY  Turning over in bed (including adjusting bedclothes, sheets and blankets)?: 4  Sitting down on and standing up from a chair with arms (e.g., wheelchair, bedside commode, etc.): 4  Moving from lying on back to sitting on  the side of the bed?: 4  Moving to and from a bed to a chair (including a wheelchair)?: 4  Need to walk in hospital room?: 3  Climbing 3-5 steps with a railing?: 3  Basic Mobility Total Score: 22       Therapeutic Activities and Exercises:   Pt performed bilateral LE: ankle pumps, Long arc quads and Marching x 20 each      Patient left up in chair with all lines intact and call button in reach..    GOALS:   Multidisciplinary Problems     Physical Therapy Goals        Problem: Physical Therapy Goal    Goal Priority Disciplines Outcome Goal Variances Interventions   Physical Therapy Goal     PT, PT/OT Ongoing, Progressing     Description: Short term goals:  1. Bed to chair transfer with Modified Napoleon using Rolling Walker  2. Gait  x 150 feet with Modified Napoleon using Rolling Walker.   3. Lower extremity exercise program x30 reps per handout, with independence    Long term goals:  1. Gait  x 350 feet with Modified Napoleon using Rolling Walker  2. Pt will return home to [facility] with all mobility.                      Time Tracking:     PT Received On: 02/15/22  PT Start Time: 1442     PT Stop Time: 1458  PT Total Time (min): 16 min     Billable Minutes: Therapeutic Activity 15    Treatment Type: Treatment  PT/PTA: PT     PTA Visit Number: 0     02/15/2022

## 2022-02-15 NOTE — PT/OT/SLP EVAL
Occupational Therapy   Evaluation    Name: Coleman Lares  MRN: [id number]  Admitting Diagnosis:  COVID-19  Recent Surgery: * No surgery found *      Recommendations:     Discharge Recommendations: home with home health,nursing facility, skilled  Discharge Equipment Recommendations:  none  Barriers to discharge:       Assessment:     Coleman Lares is a 89 y.o. male with a medical diagnosis of COVID-19.  He presents with alert and asking to get up out of bed. Performance deficits affecting function: weakness,impaired endurance,impaired self care skills,impaired functional mobilty,gait instability.      Rehab Prognosis: Good; patient would benefit from acute skilled OT services to address these deficits and reach maximum level of function.       Plan:     Patient to be seen 5 x/week to address the above listed problems via self-care/home management,therapeutic activities,therapeutic exercises  · Plan of Care Expires: 03/07/22  · Plan of Care Reviewed with: patient    Subjective     Chief Complaint: feeling weak and easily fatigued   Patient/Family Comments/goals: Return to his baseline level of independence and return home    Occupational Profile:  Living Environment: Pt lives in a single level home with a ramp to enter  Previous level of function: Pt was independent with all of his self-care. Pt's family cleaned his home for him about 1x/month  Roles and Routines: Pt lives alone and takes care of himself and lighter house work activites  Equipment Used at Home:  rollator (Pt also has a cane, BSC- that are not used)  Assistance upon Discharge: Pt may require home health or SB depending on his progress here    Pain/Comfort:  · Pain Rating 1: 0/10  · Pain Rating Post-Intervention 1: 0/10    Patients cultural, spiritual, Mormon conflicts given the current situation: no    Objective:     Communicated with: POLLY Mei prior to session.  Patient found HOB elevated with peripheral IV,oxygen upon OT entry to  room.    General Precautions: Standard, fall,droplet,contact,airborne   Orthopedic Precautions:N/A   Braces: N/A  Respiratory Status: Pt had Nasal cannula running 2L/min, but was not wearing it. Pt's O2 sats were in mid 90's when checked on room air after ambulation    Occupational Performance:    Bed Mobility:    · Patient completed Supine to Sit with modified independence  · Patient completed Sit to Supine with modified independence    Functional Mobility/Transfers:  · Patient completed Sit <> Stand Transfer with contact guard assistance  with  rolling walker   · Patient completed Bed <> Chair Transfer using Step Transfer technique with contact guard assistance with rolling walker  · Functional Mobility: Pt ambulated around the room with RW with CGA, without RW, pt required hand held assist x 2    Activities of Daily Living:  · Upper Body Dressing: minimum assistance to don robe  · Lower Body Dressing: independence with slip on house shoes  · Toileting: independence with urinal    Cognitive/Visual Perceptual:  Cognitive/Psychosocial Skills:     -       Oriented to: Person   -       Follows Commands/attention:Follows one-step commands  -       Communication: clear/fluent  -       Safety awareness/insight to disability: impaired   -       Mood/Affect/Coping skills/emotional control: Appropriate to situation, Cooperative and Pleasant    Physical Exam:  Sensation:    -       Intact  Dominant hand:    -       right  Upper Extremity Range of Motion:     -       Right Upper Extremity: WFL  -       Left Upper Extremity: WFL  Upper Extremity Strength:    -       Right Upper Extremity: WFL  -       Left Upper Extremity: WFL   Strength:    -       Right Upper Extremity: WFL  -       Left Upper Extremity: WFL  Fine Motor Coordination:    -       Intact  Gross motor coordination:   WFL    AMPAC 6 Click ADL:  AMPAC Total Score: 19    Treatment & Education:  · Pt educated on OT role/POC.   · Importance of OOB activity with  staff assistance.  · Importance of sitting up in the chair throughout the day as tolerated, especially for meals   · Safety during functional t/f and mobility with use of RW  · Importance of assisting with self-care activities   · All questions/concerns answered within OT scope of practice    Education:    Patient left up in chair with call button in reach and RN Torrey Mei notified    GOALS:   Multidisciplinary Problems     Occupational Therapy Goals        Problem: Occupational Therapy Goal    Goal Priority Disciplines Outcome Interventions   Occupational Therapy Goal     OT, PT/OT Ongoing, Progressing    Description: STG:  Pt will perform grooming with SBA at the sink  Pt will bathe with setup and SBA  Pt will perform UE dressing with setup  Pt will perform LE dressing with setup  Pt will perform toileting with SBA with toilet  Pt will transfer bed/chair/bsc with supervision with RW  Pt will perform standing task x 3 min with supervision with RW  Pt will tolerate 20 minutes of tx without fatigue      LT.Restore to max I with self care and mobility.                      History:     Past Medical History:   Diagnosis Date    Aortic regurgitation     Back pain     CAD (coronary artery disease)     Chronic back pain     Constipation     COPD (chronic obstructive pulmonary disease)     Generalized OA     Hyperlipidemia     Hypertension     Osteopenia determined by x-ray     Pulmonary stenosis        Past Surgical History:   Procedure Laterality Date    CORONARY ANGIOPLASTY WITH STENT PLACEMENT      FOOT SURGERY      Dr Jackson    HIP SURGERY      LITHOTRIPSY         Time Tracking:     OT Date of Treatment: 22  OT Start Time: 1415  OT Stop Time: 1442  OT Total Time (min): 27 min    Billable Minutes:Evaluation low complexity    2022

## 2022-02-15 NOTE — PLAN OF CARE
Problem: Adult Inpatient Plan of Care  Goal: Plan of Care Review  Outcome: Ongoing, Progressing  Goal: Patient-Specific Goal (Individualized)  Outcome: Ongoing, Progressing  Goal: Absence of Hospital-Acquired Illness or Injury  Outcome: Ongoing, Progressing  Goal: Optimal Comfort and Wellbeing  Outcome: Ongoing, Progressing  Goal: Readiness for Transition of Care  Outcome: Ongoing, Progressing     Problem: Fluid and Electrolyte Imbalance (Acute Kidney Injury/Impairment)  Goal: Fluid and Electrolyte Balance  Outcome: Ongoing, Progressing     Problem: Renal Function Impairment (Acute Kidney Injury/Impairment)  Goal: Effective Renal Function  Outcome: Ongoing, Progressing     Problem: Skin Injury Risk Increased  Goal: Skin Health and Integrity  Outcome: Ongoing, Progressing

## 2022-02-15 NOTE — PLAN OF CARE
Problem: Occupational Therapy Goal  Goal: Occupational Therapy Goal  Description: STG: By 3/7/2021:  Pt will perform grooming with SBA at the sink  Pt will bathe with setup and SBA  Pt will perform UE dressing with setup  Pt will perform LE dressing with setup  Pt will perform toileting with SBA with toilet  Pt will transfer bed/chair/bsc with supervision with RW  Pt will perform standing task x 3 min with supervision with RW  Pt will tolerate 20 minutes of tx without fatigue      LT.Restore to max I with self care and mobility.     Outcome: Ongoing, Progressing   OT low complexity evaluation performed. Pt has some weakness and decreased activity tolerance. OT will treat pt daily 5x/wk to address the above goals. Pt may benefit from SB at D/C as he lives alone with family nearby.

## 2022-02-15 NOTE — ASSESSMENT & PLAN NOTE
Improving SOB, decreased O2 requirement.   Covid positive on PCR  RA oxygen saturations improved.    Unable to receive Remdesivir due to kidney function  Baricitinib 1mg PO QD initiated, will complete 14 day course if able.  Decadron 6mg PO QD, started 2/11 Today is day 5.  Monitor in isolation  PT/OT

## 2022-02-15 NOTE — SUBJECTIVE & OBJECTIVE
Interval History:  Feeling better. Had BM     Review of Systems   Respiratory: Negative for shortness of breath.    Cardiovascular: Negative for chest pain.   Gastrointestinal: Negative for abdominal pain, nausea and vomiting.     Objective:     Vital Signs (Most Recent):  Temp: 98 °F (36.7 °C) (02/15/22 1000)  Pulse: 65 (02/15/22 1000)  Resp: 18 (02/15/22 1000)  BP: 139/85 (02/15/22 1000)  SpO2: 95 % (02/15/22 1000) Vital Signs (24h Range):  Temp:  [97.2 °F (36.2 °C)-98 °F (36.7 °C)] 98 °F (36.7 °C)  Pulse:  [59-73] 65  Resp:  [16-18] 18  SpO2:  [95 %-98 %] 95 %  BP: (139-164)/(82-93) 139/85     Weight: 68.1 kg (150 lb 2.1 oz)  Body mass index is 20.94 kg/m².    Intake/Output Summary (Last 24 hours) at 2/15/2022 1407  Last data filed at 2/15/2022 1300  Gross per 24 hour   Intake 450 ml   Output 800 ml   Net -350 ml      Physical Exam  Vitals reviewed.   Constitutional:       General: He is not in acute distress.  HENT:      Head: Normocephalic.   Cardiovascular:      Rate and Rhythm: Normal rate and regular rhythm.      Heart sounds: Normal heart sounds.   Pulmonary:      Effort: Pulmonary effort is normal.      Breath sounds: Normal breath sounds. No wheezing.   Abdominal:      General: Abdomen is flat. Bowel sounds are normal. There is no distension.      Palpations: Abdomen is soft.      Tenderness: There is no abdominal tenderness.   Neurological:      Mental Status: He is alert.         Significant Labs: All pertinent labs within the past 24 hours have been reviewed.  BMP: No results for input(s): GLU, NA, K, CL, CO2, BUN, CREATININE, CALCIUM, MG in the last 48 hours.  CBC: No results for input(s): WBC, HGB, HCT, PLT in the last 48 hours.    Significant Imaging: I have reviewed all pertinent imaging results/findings within the past 24 hours.

## 2022-02-15 NOTE — PROGRESS NOTES
[facility] - [address] [place] [address] Medicine  Progress Note    Patient Name: Coleman Lares  MRN: [id number]  Patient Class: IP- Inpatient   Admission Date: 2/10/2022  Length of Stay: 5 days  Attending Physician: Duane Kline Jr., MD  Primary Care Provider: Salinas Bird DO        Subjective:     Principal Problem:COVID-19        HPI:  Mr. Lares is an 89 year old WM who presents to ED with complaint of generalized weakness and shortness of breath. He states he took his daughter to therapy appointment and shortly afterwards his daughter got sick. He states she was sick approximately 4-5 days and within the week he was sick. He states he has had an intermittent cough, shortness of breath, and weakness. He also endorses variable appetite for the last 3 days. Workup revealed he is Covid positive, dehydrated, and experiencing acute kidney injury.     Pertinent medical history of chronic back pain, constipation, hypertension, osteopenia, CAD, aortic regurgitation, pulmonary stenosis, COPD, HLD, vitamin D deficiency.       Overview/Hospital Course:  2/11: Seen by Surgery on yesterday for bilateral inguinal hernias and reduced. No complications noted. Pending consult with Urology for hydronephrosis/nephrolithiasis on CT AP. ECHO on yesterday with noted atrial fibrillation and EF of 25%. Cardizem resumed from previous medicine list, but no anticoagulant therapy noted and pharmacy states only filled pain medicine in the last 3 months. He has not had cardiology follow up. Previous Cardiologist Dr. Langley. Covid positive. Day 2 of Baricitinib. Pending VQ scan for elevated d-dimer       Interval History:  Feeling better. Had BM     Review of Systems   Respiratory: Negative for shortness of breath.    Cardiovascular: Negative for chest pain.   Gastrointestinal: Negative for abdominal pain, nausea and vomiting.     Objective:     Vital Signs (Most Recent):  Temp: 98 °F (36.7 °C) (02/15/22  1000)  Pulse: 65 (02/15/22 1000)  Resp: 18 (02/15/22 1000)  BP: 139/85 (02/15/22 1000)  SpO2: 95 % (02/15/22 1000) Vital Signs (24h Range):  Temp:  [97.2 °F (36.2 °C)-98 °F (36.7 °C)] 98 °F (36.7 °C)  Pulse:  [59-73] 65  Resp:  [16-18] 18  SpO2:  [95 %-98 %] 95 %  BP: (139-164)/(82-93) 139/85     Weight: 68.1 kg (150 lb 2.1 oz)  Body mass index is 20.94 kg/m².    Intake/Output Summary (Last 24 hours) at 2/15/2022 1407  Last data filed at 2/15/2022 1300  Gross per 24 hour   Intake 450 ml   Output 800 ml   Net -350 ml      Physical Exam  Vitals reviewed.   Constitutional:       General: He is not in acute distress.  HENT:      Head: Normocephalic.   Cardiovascular:      Rate and Rhythm: Normal rate and regular rhythm.      Heart sounds: Normal heart sounds.   Pulmonary:      Effort: Pulmonary effort is normal.      Breath sounds: Normal breath sounds. No wheezing.   Abdominal:      General: Abdomen is flat. Bowel sounds are normal. There is no distension.      Palpations: Abdomen is soft.      Tenderness: There is no abdominal tenderness.   Neurological:      Mental Status: He is alert.         Significant Labs: All pertinent labs within the past 24 hours have been reviewed.  BMP: No results for input(s): GLU, NA, K, CL, CO2, BUN, CREATININE, CALCIUM, MG in the last 48 hours.  CBC: No results for input(s): WBC, HGB, HCT, PLT in the last 48 hours.    Significant Imaging: I have reviewed all pertinent imaging results/findings within the past 24 hours.      Assessment/Plan:      * COVID-19  Improving SOB, decreased O2 requirement.   Covid positive on PCR  RA oxygen saturations improved.    Unable to receive Remdesivir due to kidney function  Baricitinib 1mg PO QD initiated, will complete 14 day course if able.  Decadron 6mg PO QD, started 2/11 Today is day 5.  Monitor in isolation  PT/OT      EMETERIO (acute kidney injury)  Renal function improving. Monitoring.     Cardiomyopathy    Evaluated by cardiology, age and renal  function preclude invasive eval. Maintain euvolemic state.  EF25%.  Currently seems compensated.     UTI (urinary tract infection)  >100k E.coli sensitive to Rocephin.  Day number 4 Rocephin.     Shortness of breath  Subacute, variable  Oxygen sat room air 88%  Covid 19 positive  D-dimer 6.34; negative LE dopplers  History of cardiac stent 20+ years ago  ECHO, VQ lung scan pending   Check troponin, BNP    History of bilateral inguinal hernias  Acute, variable  Palpable, non-reducible mass to RLQ suspicious for inguinal hernia  CT abdomen/pelvis w/o pending  Therapy pending diagnostics    2/11: Bilateral inguinal hernias on CT AP; Reduced by surgery. No further issues at this time    Dehydration  Acute, variable  IVF bolus NS 500cc  IVF maintenance at 75ml/hr  Monitor BMP    2/11: No improvement with BUN/Creat with IVF; felt to be postrenal azotemia. Pending urology consult    Moderate chronic obstructive pulmonary disease  Chronic, variable  Denies home O2 use  CXR with chronic lung changes, no acute processes  Supplemental oxygen, PO steroids  Monitor       Hyperlipidemia  Chronic, variable  Continue previous medicine regimen of Zocor 80mg PO QD substituted for Lipitor 40mg PO QD, ASA 81mg PO QD  Obtain Lipid panel in AM  Monitor      Lumbosacral spondylosis without myelopathy  Chronic, variable  Continue home medicine regimen of Norco 10-325mg PO Q8 hours  Monitor      Hypertension  Chronic, variable  Home medicine regimens of Coreg 6.25mg PO BID, Losartan 50mg PO QD, Nifedipine ER 30mg PO QD on med list; has not been filled recently  Resume Coreg 6.25mg PO BID due to tachycardia  Monitor BP trend    2/11: Variable BP trend; 90s-170s systolic. Continue Coreg 6.25mg PO BID at this time. Alterations to regimens as indicated      VTE Risk Mitigation (From admission, onward)         Ordered     heparin (porcine) injection 5,000 Units  Every 8 hours         02/10/22 1446                Discharge Planning   PARAS:       Code Status: Not on file   Is the patient medically ready for discharge?:     Reason for patient still in hospital (select all that apply): Treatment  Discharge Plan A: Home                  Duane Kline Jr, MD  Department of Hospital Medicine   [address]

## 2022-02-15 NOTE — PROGRESS NOTES
[facility] - [address] [place] [address] Medicine  Progress Note    Patient Name: Coleman Lares  MRN: [id number]  Patient Class: IP- Inpatient   Admission Date: 2/10/2022  Length of Stay: 5 days  Attending Physician: Duane Kline Jr., MD  Primary Care Provider: Salinas Bird DO        Subjective:     Principal Problem:COVID-19        HPI:  Mr. Lares is an 89 year old WM who presents to ED with complaint of generalized weakness and shortness of breath. He states he took his daughter to therapy appointment and shortly afterwards his daughter got sick. He states she was sick approximately 4-5 days and within the week he was sick. He states he has had an intermittent cough, shortness of breath, and weakness. He also endorses variable appetite for the last 3 days. Workup revealed he is Covid positive, dehydrated, and experiencing acute kidney injury.     Pertinent medical history of chronic back pain, constipation, hypertension, osteopenia, CAD, aortic regurgitation, pulmonary stenosis, COPD, HLD, vitamin D deficiency.       Overview/Hospital Course:  2/11: Seen by Surgery on yesterday for bilateral inguinal hernias and reduced. No complications noted. Pending consult with Urology for hydronephrosis/nephrolithiasis on CT AP. ECHO on yesterday with noted atrial fibrillation and EF of 25%. Cardizem resumed from previous medicine list, but no anticoagulant therapy noted and pharmacy states only filled pain medicine in the last 3 months. He has not had cardiology follow up. Previous Cardiologist Dr. Langley. Covid positive. Day 2 of Baricitinib. Pending VQ scan for elevated d-dimer       No new subjective & objective note has been filed under this hospital service since the last note was generated.      Assessment/Plan:      * COVID-19  Improving SOB, decreased O2 requirement.   Covid positive on PCR  RA oxygen saturations improved.    Unable to receive Remdesivir due to kidney function  Baricitinib  1mg PO QD initiated, will complete 14 day course if able.  Decadron 6mg PO QD, started 2/11 Today is day 5.  Monitor in isolation  PT/OT      EMETERIO (acute kidney injury)  Renal function improving. Monitoring.     Cardiomyopathy    Evaluated by cardiology, age and renal function preclude invasive eval. Maintain euvolemic state.  EF25%.  Currently seems compensated.     UTI (urinary tract infection)  >100k E.coli sensitive to Rocephin.  Day number 4 Rocephin.     Shortness of breath  Subacute, variable  Oxygen sat room air 88%  Covid 19 positive  D-dimer 6.34; negative LE dopplers  History of cardiac stent 20+ years ago  ECHO, VQ lung scan pending   Check troponin, BNP    History of bilateral inguinal hernias  Acute, variable  Palpable, non-reducible mass to RLQ suspicious for inguinal hernia  CT abdomen/pelvis w/o pending  Therapy pending diagnostics    2/11: Bilateral inguinal hernias on CT AP; Reduced by surgery. No further issues at this time    Dehydration  Acute, variable  IVF bolus NS 500cc  IVF maintenance at 75ml/hr  Monitor BMP    2/11: No improvement with BUN/Creat with IVF; felt to be postrenal azotemia. Pending urology consult    Moderate chronic obstructive pulmonary disease  Chronic, variable  Denies home O2 use  CXR with chronic lung changes, no acute processes  Supplemental oxygen, PO steroids  Monitor       Hyperlipidemia  Chronic, variable  Continue previous medicine regimen of Zocor 80mg PO QD substituted for Lipitor 40mg PO QD, ASA 81mg PO QD  Obtain Lipid panel in AM  Monitor      Lumbosacral spondylosis without myelopathy  Chronic, variable  Continue home medicine regimen of Norco 10-325mg PO Q8 hours  Monitor      Hypertension  Chronic, variable  Home medicine regimens of Coreg 6.25mg PO BID, Losartan 50mg PO QD, Nifedipine ER 30mg PO QD on med list; has not been filled recently  Resume Coreg 6.25mg PO BID due to tachycardia  Monitor BP trend    2/11: Variable BP trend; 90s-170s systolic.  Continue Coreg 6.25mg PO BID at this time. Alterations to regimens as indicated      VTE Risk Mitigation (From admission, onward)         Ordered     heparin (porcine) injection 5,000 Units  Every 8 hours         02/10/22 1446                Discharge Planning   PARAS:      Code Status: Not on file   Is the patient medically ready for discharge?:     Reason for patient still in hospital (select all that apply): Treatment  Discharge Plan A: Home                  Duane Kline Jr, MD  Department of Hospital Medicine   [address]

## 2022-02-15 NOTE — PT/OT/SLP PROGRESS
Occupational Therapy   Treatment    Name: Coleman Lares  MRN: [id number]  Admitting Diagnosis:  COVID-19       Recommendations:     Discharge Recommendations: home with home health,nursing facility, skilled  Discharge Equipment Recommendations:  none  Barriers to discharge:       Assessment:     Coleman Lares is a 89 y.o. male with a medical diagnosis of COVID-19.  He presents with fatigue following his PT session, but agreeable to treatment. Performance deficits affecting function are weakness,impaired balance,impaired cardiopulmonary response to activity,impaired endurance,impaired joint extensibility,impaired self care skills,gait instability.     Rehab Prognosis:  Good; patient would benefit from acute skilled OT services to address these deficits and reach maximum level of function.       Plan:     Patient to be seen 5 x/week to address the above listed problems via self-care/home management,therapeutic activities,therapeutic exercises  · Plan of Care Expires: 03/07/22  · Plan of Care Reviewed with: patient    Subjective     Pain/Comfort:  · Pain Rating 1: 0/10  · Pain Rating Post-Intervention 1: 0/10    Objective:     Communicated with: POLLY Patel prior to session.  Patient found up in chair with peripheral IV upon OT entry to room.    General Precautions: Standard, airborne,droplet,fall,contact   Orthopedic Precautions:N/A   Braces: N/A  Respiratory Status: Room air     Occupational Performance:     Bed Mobility:    · NT    Functional Mobility/Transfers:  · Patient completed Sit <> Stand Transfer with contact guard assistance  with  rolling walker   · Functional Mobility: Pt ambulates with RW with CGA    Activities of Daily Living:  · Bathing: PT bathed after setup for washing upper body anterior, (B) LEs. Pt had already had a bath earlier, but had an accident with his urinal and required additional cleaning    · Upper Body Dressing: contact guard assistance  for hospital gown      [facility] 6 Click  ADL: 20    Treatment & Education:  Pt performed (B) UE ex for 10 reps of each:  Elbow flexion with 2# db  Shoulder diagonal with 2# db  Shoulder flexion with 2# db    Patient left up in chair with call button in reach and RN Cinthya notifiedEducation:      GOALS:   Multidisciplinary Problems     Occupational Therapy Goals        Problem: Occupational Therapy Goal    Goal Priority Disciplines Outcome Interventions   Occupational Therapy Goal     OT, PT/OT Ongoing, Progressing    Description: STG:  Pt will perform grooming with SBA at the sink  Pt will bathe with setup and SBA  Pt will perform UE dressing with setup  Pt will perform LE dressing with setup  Pt will perform toileting with SBA with toilet  Pt will transfer bed/chair/bsc with supervision with RW  Pt will perform standing task x 3 min with supervision with RW  Pt will tolerate 20 minutes of tx without fatigue      LT.Restore to max I with self care and mobility.                      Time Tracking:     OT Date of Treatment: 02/15/22  OT Start Time: 1500  OT Stop Time: 1518  OT Total Time (min): 18 min    Billable Minutes:Self Care/Home Management 18 min    OT/SALOMON: OT          2/15/2022

## 2022-02-16 VITALS
OXYGEN SATURATION: 96 % | HEART RATE: 75 BPM | HEIGHT: 71 IN | BODY MASS INDEX: 20.19 KG/M2 | WEIGHT: 144.19 LBS | TEMPERATURE: 97 F | SYSTOLIC BLOOD PRESSURE: 145 MMHG | DIASTOLIC BLOOD PRESSURE: 83 MMHG | RESPIRATION RATE: 20 BRPM

## 2022-02-16 LAB
ANION GAP SERPL CALCULATED.3IONS-SCNC: 16 MMOL/L (ref 7–16)
BASOPHILS # BLD AUTO: 0.03 K/UL (ref 0–0.2)
BASOPHILS NFR BLD AUTO: 0.1 % (ref 0–1)
BUN SERPL-MCNC: 53 MG/DL (ref 7–18)
BUN/CREAT SERPL: 40 (ref 6–20)
CALCIUM SERPL-MCNC: 8.6 MG/DL (ref 8.5–10.1)
CHLORIDE SERPL-SCNC: 103 MMOL/L (ref 98–107)
CO2 SERPL-SCNC: 26 MMOL/L (ref 21–32)
CREAT SERPL-MCNC: 1.33 MG/DL (ref 0.7–1.3)
CRENATED CELLS: ABNORMAL
DIFFERENTIAL METHOD BLD: ABNORMAL
EOSINOPHIL # BLD AUTO: 0 K/UL (ref 0–0.5)
EOSINOPHIL NFR BLD AUTO: 0 % (ref 1–4)
ERYTHROCYTE [DISTWIDTH] IN BLOOD BY AUTOMATED COUNT: 13.6 % (ref 11.5–14.5)
GLUCOSE SERPL-MCNC: 154 MG/DL (ref 74–106)
HCT VFR BLD AUTO: 43.9 % (ref 40–54)
HGB BLD-MCNC: 14.4 G/DL (ref 13.5–18)
IMM GRANULOCYTES # BLD AUTO: 0.28 K/UL (ref 0–0.04)
IMM GRANULOCYTES NFR BLD: 1.3 % (ref 0–0.4)
LYMPHOCYTES # BLD AUTO: 0.34 K/UL (ref 1–4.8)
LYMPHOCYTES NFR BLD AUTO: 1.5 % (ref 27–41)
LYMPHOCYTES NFR BLD MANUAL: 1 % (ref 27–41)
MCH RBC QN AUTO: 30 PG (ref 27–31)
MCHC RBC AUTO-ENTMCNC: 32.8 G/DL (ref 32–36)
MCV RBC AUTO: 91.5 FL (ref 80–96)
MONOCYTES # BLD AUTO: 0.53 K/UL (ref 0–0.8)
MONOCYTES NFR BLD AUTO: 2.4 % (ref 2–6)
MONOCYTES NFR BLD MANUAL: 1 % (ref 2–6)
MPC BLD CALC-MCNC: 12.6 FL (ref 9.4–12.4)
NEUTROPHILS # BLD AUTO: 20.92 K/UL (ref 1.8–7.7)
NEUTROPHILS NFR BLD AUTO: 94.7 % (ref 53–65)
NEUTS SEG NFR BLD MANUAL: 98 % (ref 50–62)
NRBC # BLD AUTO: 0 X10E3/UL
NRBC, AUTO (.00): 0 %
OVALOCYTES BLD QL SMEAR: ABNORMAL
PLATELET # BLD AUTO: 204 K/UL (ref 150–400)
PLATELET MORPHOLOGY: ABNORMAL
POLYCHROMASIA BLD QL SMEAR: ABNORMAL
POTASSIUM SERPL-SCNC: 4.8 MMOL/L (ref 3.5–5.1)
RBC # BLD AUTO: 4.8 M/UL (ref 4.6–6.2)
SODIUM SERPL-SCNC: 140 MMOL/L (ref 136–145)
WBC # BLD AUTO: 22.1 K/UL (ref 4.5–11)

## 2022-02-16 PROCEDURE — 80048 BASIC METABOLIC PNL TOTAL CA: CPT | Performed by: STUDENT IN AN ORGANIZED HEALTH CARE EDUCATION/TRAINING PROGRAM

## 2022-02-16 PROCEDURE — 25000003 PHARM REV CODE 250: Performed by: HOSPITALIST

## 2022-02-16 PROCEDURE — 99223 PR INITIAL HOSPITAL CARE,LEVL III: ICD-10-PCS | Mod: ,,, | Performed by: STUDENT IN AN ORGANIZED HEALTH CARE EDUCATION/TRAINING PROGRAM

## 2022-02-16 PROCEDURE — 85025 COMPLETE CBC W/AUTO DIFF WBC: CPT | Performed by: STUDENT IN AN ORGANIZED HEALTH CARE EDUCATION/TRAINING PROGRAM

## 2022-02-16 PROCEDURE — 25000003 PHARM REV CODE 250: Performed by: NURSE PRACTITIONER

## 2022-02-16 PROCEDURE — 36415 COLL VENOUS BLD VENIPUNCTURE: CPT | Performed by: STUDENT IN AN ORGANIZED HEALTH CARE EDUCATION/TRAINING PROGRAM

## 2022-02-16 PROCEDURE — C9399 UNCLASSIFIED DRUGS OR BIOLOG: HCPCS | Performed by: NURSE PRACTITIONER

## 2022-02-16 PROCEDURE — 99238 PR HOSPITAL DISCHARGE DAY,<30 MIN: ICD-10-PCS | Mod: ,,, | Performed by: FAMILY MEDICINE

## 2022-02-16 PROCEDURE — 99223 1ST HOSP IP/OBS HIGH 75: CPT | Mod: ,,, | Performed by: STUDENT IN AN ORGANIZED HEALTH CARE EDUCATION/TRAINING PROGRAM

## 2022-02-16 PROCEDURE — 25000242 PHARM REV CODE 250 ALT 637 W/ HCPCS: Performed by: HOSPITALIST

## 2022-02-16 PROCEDURE — 63600175 PHARM REV CODE 636 W HCPCS: Performed by: HOSPITALIST

## 2022-02-16 PROCEDURE — 63600175 PHARM REV CODE 636 W HCPCS: Performed by: NURSE PRACTITIONER

## 2022-02-16 PROCEDURE — 99238 HOSP IP/OBS DSCHRG MGMT 30/<: CPT | Mod: ,,, | Performed by: FAMILY MEDICINE

## 2022-02-16 RX ORDER — LOSARTAN POTASSIUM 50 MG/1
50 TABLET ORAL DAILY
Qty: 30 TABLET | Refills: 0
Start: 2022-02-16 | End: 2022-02-23 | Stop reason: SDUPTHER

## 2022-02-16 RX ORDER — SULFAMETHOXAZOLE AND TRIMETHOPRIM 800; 160 MG/1; MG/1
1 TABLET ORAL 2 TIMES DAILY
Qty: 4 TABLET | Refills: 0 | Status: SHIPPED | OUTPATIENT
Start: 2022-02-16 | End: 2022-02-18

## 2022-02-16 RX ORDER — DEXAMETHASONE 2 MG/1
TABLET ORAL
Qty: 13 TABLET | Refills: 0 | Status: SHIPPED | OUTPATIENT
Start: 2022-02-16 | End: 2022-02-21

## 2022-02-16 RX ORDER — TAMSULOSIN HYDROCHLORIDE 0.4 MG/1
0.4 CAPSULE ORAL DAILY
Qty: 30 CAPSULE | Refills: 0 | Status: SHIPPED | OUTPATIENT
Start: 2022-02-17 | End: 2022-03-15 | Stop reason: SDUPTHER

## 2022-02-16 RX ADMIN — HEPARIN SODIUM 5000 UNITS: 5000 INJECTION INTRAVENOUS; SUBCUTANEOUS at 05:02

## 2022-02-16 RX ADMIN — OXYCODONE HYDROCHLORIDE 5 MG: 5 TABLET ORAL at 05:02

## 2022-02-16 RX ADMIN — ASPIRIN 81 MG: 81 TABLET, COATED ORAL at 08:02

## 2022-02-16 RX ADMIN — CEFTRIAXONE SODIUM 1 G: 1 INJECTION, POWDER, FOR SOLUTION INTRAMUSCULAR; INTRAVENOUS at 08:02

## 2022-02-16 RX ADMIN — CHOLECALCIFEROL TAB 10 MCG (400 UNIT) 800 UNITS: 10 TAB at 08:02

## 2022-02-16 RX ADMIN — OXYCODONE HYDROCHLORIDE 5 MG: 5 TABLET ORAL at 12:02

## 2022-02-16 RX ADMIN — CARVEDILOL 6.25 MG: 6.25 TABLET, FILM COATED ORAL at 08:02

## 2022-02-16 RX ADMIN — BARICITINIB 1 MG: 1 TABLET, FILM COATED ORAL at 08:02

## 2022-02-16 RX ADMIN — DEXAMETHASONE 6 MG: 2 TABLET ORAL at 08:02

## 2022-02-16 RX ADMIN — TAMSULOSIN HYDROCHLORIDE 0.4 MG: 0.4 CAPSULE ORAL at 08:02

## 2022-02-16 RX ADMIN — HEPARIN SODIUM 5000 UNITS: 5000 INJECTION INTRAVENOUS; SUBCUTANEOUS at 01:02

## 2022-02-16 NOTE — DISCHARGE SUMMARY
[address] Medicine  Discharge Summary      Patient Name: Coleman Lares  MRN: [id number]  Patient Class: IP- Inpatient  Admission Date: 2/10/2022  Hospital Length of Stay: 6 days  Discharge Date and Time:  02/16/2022 12:42 PM  Attending Physician: Duane Kline Jr., MD   Discharging Provider: LEXIE Rincon  Primary Care Provider: Salinas Bird DO      HPI:   Mr. Lares is an 89 year old WM who presents to ED with complaint of generalized weakness and shortness of breath. He states he took his daughter to therapy appointment and shortly afterwards his daughter got sick. He states she was sick approximately 4-5 days and within the week he was sick. He states he has had an intermittent cough, shortness of breath, and weakness. He also endorses variable appetite for the last 3 days. Workup revealed he is Covid positive, dehydrated, and experiencing acute kidney injury.     Pertinent medical history of chronic back pain, constipation, hypertension, osteopenia, CAD, aortic regurgitation, pulmonary stenosis, COPD, HLD, vitamin D deficiency.       * No surgery found *      Hospital Course:   2/11: Seen by Surgery on yesterday for bilateral inguinal hernias and reduced. No complications noted. Pending consult with Urology for hydronephrosis/nephrolithiasis on CT AP. ECHO on yesterday with noted atrial fibrillation and EF of 25%. Cardizem resumed from previous medicine list, but no anticoagulant therapy noted and pharmacy states only filled pain medicine in the last 3 months. He has not had cardiology follow up. Previous Cardiologist Dr. Langley. Covid positive. Day 2 of Baricitinib. Pending VQ scan for elevated d-dimer     02/16--pt with no new concerns or issues current o2 sats are 97% on room air.   Is out of contagious window for Covid but instructed to wear a mask for the next 4 days.   Pt was not able to receive remdesivir due to renal function, D dimer  elevated on admission, pt has received 6 days sub q anticoagulation and will be sent home on asa 81mg daily and Apixaban 2.5mg bid x's 14 days. Discussed safety precautions and bleeding precautions while on Eliquis.  Renal function has improved but is not at baseline, pt will need to follow up with pcp in one week with repeat BMP.  Will be sent home with  steroid taper, and  2 days of oral ABT for UTI; Received 5 days of IV Rocephin for complicated UTI.  Pt will follow up with Dr. Nickerson in 4 weeks.  Found to have Bilateral inguinal hernias, however, they were non tender and reducible, pt to follow up with general surgery as needed.  Requested to establish care with new cardiologist, will have appt scheduled with Dr. Anuj johnston's 3-4 weeks.  Has met max benefit from this hospitalization and is stable for discharge.     Addendum, CBC resulted with elevated WBC, pt is afebrile, no new concerns or issues, likely due to steroid use.  Follow up with pcp.     Goals of Care Treatment Preferences:         Consults:   Consults (From admission, onward)        Status Ordering Provider     Inpatient consult to Cardiology  Once        Provider:  Dany Lizama MD    Completed CARL MOORE.     Inpatient consult to General Surgery  Once        Provider:  Bony Pedraza DO    Completed CARL MOORE.     Inpatient consult to Urology  Once        Provider:  Kaz Nickerson Jr., MD    Acknowledged CARL MOORE.          * COVID-19  Improving SOB, decreased O2 requirement.   Covid positive on PCR  RA oxygen saturations improved.    Unable to receive Remdesivir due to kidney function  Baricitinib 1mg PO QD initiated, will complete 14 day course if able.  Decadron 6mg PO QD, started 2/11 Today is day 5.  Monitor in isolation  PT/OT      02/16--D/c on steroid taper x's 5 days, home o2 to use as needed, continue to wear a mask for the next four days.    Cardiomyopathy    Evaluated by cardiology, age and renal function preclude  invasive eval. Maintain euvolemic state.  EF25%.  Currently seems compensated.     02/16--Request to establish care with new cardiologist, will schedule appt for 3-4 weeks from discharge.     Shortness of breath  Subacute, variable  Oxygen sat room air 88%  Covid 19 positive  D-dimer 6.34; negative LE dopplers  History of cardiac stent 20+ years ago  ECHO, VQ lung scan pending   Check troponin, BNP    02/06--room air sats in the upper 90s, 97% today    EMETERIO (acute kidney injury)  Renal function improving. Monitoring.     02/16--sCr 1.33 which is elevated above baseline of 1.06, however, has improved since admission. Pt to follow up with pcp with repeat labs in one week.      UTI (urinary tract infection)  >100k E.coli sensitive to Rocephin.  Day number 4 Rocephin.     02/16--Received 5 days of IV Rocephin, will discharge on 2 days of oral ABT for completeness, encouraged po intake     Hypertension  Chronic, variable  Home medicine regimens of Coreg 6.25mg PO BID, Losartan 50mg PO QD, Nifedipine ER 30mg PO QD on med list; has not been filled recently  Resume Coreg 6.25mg PO BID due to tachycardia  Monitor BP trend    2/11: Variable BP trend; 90s-170s systolic. Continue Coreg 6.25mg PO BID at this time. Alterations to regimens as indicated    02/16--BP slightly elevated with current regimen, pt to continue present treatment but add Nifedipine back to regimen, monitor blood pressure closely and follow up with pcp in one week.      Final Active Diagnoses:    Diagnosis Date Noted POA    PRINCIPAL PROBLEM:  COVID-19 [U07.1] 02/10/2022 Yes    Cardiomyopathy [I42.9] 02/12/2022 Yes    UTI (urinary tract infection) [N39.0] 02/10/2022 Yes    EMETERIO (acute kidney injury) [N17.9] 02/10/2022 Yes    Shortness of breath [R06.02] 02/10/2022 Yes    Hypertension [I10] 08/03/2021 Yes      Problems Resolved During this Admission:    Diagnosis Date Noted Date Resolved POA    Elevated d-dimer [R79.89] 02/10/2022 02/14/2022 Yes        Discharged Condition: stable    Disposition:     Follow Up:   Follow-up Information     Zac Ambrocio DO In 1 week.    Specialties: Family Medicine, Emergency Medicine  Why: Hospital follow up  Contact information:  [address]  Critical access hospital [address]  [phone]             Winifred Leslie MD In 3 weeks.    Specialties: Interventional Cardiology, Cardiology  Why: Hospital follow up, establish care  Contact information:  [address] [facility] [address]  [phone]             Kaz Nickerson Jr, MD In 4 weeks.    Specialty: Urology  Why: Hospital follow up, establish care  Contact information:  [address] [place] [address]  [phone]                       Patient Instructions:      Diet Cardiac     Notify your health care provider if you experience any of the following:  temperature >100.4     Notify your health care provider if you experience any of the following:  persistent nausea and vomiting or diarrhea     Notify your health care provider if you experience any of the following:  severe uncontrolled pain     Notify your health care provider if you experience any of the following:  redness, tenderness, or signs of infection (pain, swelling, redness, odor or green/yellow discharge around incision site)     Notify your health care provider if you experience any of the following:  difficulty breathing or increased cough     Notify your health care provider if you experience any of the following:  severe persistent headache     Notify your health care provider if you experience any of the following:  worsening rash     Notify your health care provider if you experience any of the following:  persistent dizziness, light-headedness, or visual disturbances     Notify your health care provider if you experience any of the following:  increased confusion or weakness     Activity as tolerated       Significant Diagnostic Studies: Labs:   BMP:    Recent Labs   Lab 02/16/22  1147   *      K 4.8      CO2 26   BUN 53*   CREATININE 1.33*   CALCIUM 8.6    and CBC No results for input(s): WBC, HGB, HCT, PLT in the last 48 hours.    Pending Diagnostic Studies:     Procedure Component Value Units Date/Time    CBC Auto Differential [[id number]] Collected: 02/16/22 1147    Order Status: Sent Lab Status: In process Updated: 02/16/22 1237    Specimen: Blood     Narrative:      The following orders were created for panel order CBC Auto Differential.  Procedure                               Abnormality         Status                     ---------                               -----------         ------                     CBC with Differential[[id number]]                            In process                 Manual Differential[[id number]]                                                           Please view results for these tests on the individual orders.    CBC with Differential [[id number]] Collected: 02/16/22 1234    Order Status: Sent Lab Status: In process Updated: 02/16/22 1237    Specimen: Blood     EKG 12-lead [[id number]]     Order Status: Sent Lab Status: No result     EKG 12-lead [[id number]] Collected: 02/10/22 1119    Order Status: Sent Lab Status: In process Updated: 02/10/22 1701    Narrative:      Test Reason : R06.02,    Vent. Rate : 122 BPM     Atrial Rate : 000 BPM     P-R Int : 114 ms          QRS Dur : 146 ms      QT Int : 322 ms       P-R-T Axes : 072 -76 080 degrees     QTc Int : 431 ms    Sinus tachycardia  with PVC(s)  with PAC(s)  Lead(s) unsuitable for analysis:  V5  Possible sequence error: V1,V2 omitted  Marked left axis deviation  IV conduction defect  Inferior ST elevation  suggests early repolarization  Anterior T wave abnormality  is nonspecific  Abnormal ECG      Referred By: [email or web address]   SELF           Confirmed By:     EKG 12-lead [[id number]]     Order Status: Sent Lab Status: No result     EKG 12-lead [[id number]]      Order Status: Sent Lab Status: No result          Medications:  Reconciled Home Medications:      Medication List      START taking these medications    apixaban 2.5 mg Tab  Commonly known as: ELIQUIS  Take 1 tablet (2.5 mg total) by mouth 2 (two) times daily. for 14 days     dexAMETHasone 2 MG tablet  Commonly known as: DECADRON  Take 2 tablets (4 mg total) by mouth 2 (two) times daily with meals for 2 days, THEN 1 tablet (2 mg total) 2 (two) times daily with meals for 2 days, THEN 0.5 tablets (1 mg total) 2 (two) times daily with meals for 1 day.  Start taking on: February 16, 2022     sulfamethoxazole-trimethoprim 800-160mg 800-160 mg Tab  Commonly known as: BACTRIM DS  Take 1 tablet by mouth 2 (two) times daily. for 2 days     tamsulosin 0.4 mg Cap  Commonly known as: FLOMAX  Take 1 capsule (0.4 mg total) by mouth once daily.  Start taking on: February 17, 2022        CHANGE how you take these medications    losartan 50 MG tablet  Commonly known as: COZAAR  Take 1 tablet (50 mg total) by mouth once daily. Cont to hold pending hospital follow up  What changed: additional instructions        CONTINUE taking these medications    acetaminophen 325 MG tablet  Commonly known as: TYLENOL  Take 325 mg by mouth every 6 (six) hours as needed for Pain.     aspirin 81 MG EC tablet  Commonly known as: ECOTRIN  Take 1 tablet (81 mg total) by mouth once daily.     carvediloL 6.25 MG tablet  Commonly known as: COREG  Take 6.25 mg by mouth once daily.     cholecalciferol (vitamin D3) 10 mcg (400 unit) Cap  Take 2 capsules (800 Units total) by mouth once daily.     * HYDROcodone-acetaminophen  mg per tablet  Commonly known as: NORCO  Take 1 tablet by mouth every 8 (eight) hours.     * HYDROcodone-acetaminophen  mg per tablet  Commonly known as: NORCO  Take 1 tablet by mouth every 8 (eight) hours.  Start taking on: March 7, 2022     * HYDROcodone-acetaminophen  mg per tablet  Commonly known as: NORCO  Take 1  tablet by mouth every 8 (eight) hours.  Start taking on: April 6, 2022     NIFEdipine 30 MG Tbsr  Commonly known as: ADALAT CC  Take 30 mg by mouth once daily.     simvastatin 80 MG tablet  Commonly known as: ZOCOR  Take 80 mg by mouth every evening. Take 1/2 tablet once at night.     traZODone 50 MG tablet  Commonly known as: DESYREL  Take 1 tablet (50 mg total) by mouth every evening.         * This list has 3 medication(s) that are the same as other medications prescribed for you. Read the directions carefully, and ask your doctor or other care provider to review them with you.                Indwelling Lines/Drains at time of discharge:   Lines/Drains/Airways     None                 Time spent on the discharge of patient: >30 minutes         LEXIE Rincon  Department of Hospital Medicine  [address]

## 2022-02-16 NOTE — NURSING
Wale Felix NP came to see patient.      Will not make an appointment at this time but informed patient that if he has any trouble he can call and make an appointment at a later date.

## 2022-02-16 NOTE — PROGRESS NOTES
Mr. Lares was seen this afternoon prior to being discharged.  He has a left distal ureteral stone that we measured as 5.5 mm x 4.5 mm.  Dr. Nickerson says this is a passable size stone and we should give Mr. Lares time to try and pass it.    Dr. Nickerson will not need to see Mr. Lares in the office after discharge unless he starts having ureteral colic.  I told Mr. Lares to call our office if he starts to have problems with the stone and we will see him at that time and possibly set up a time to have his stone treated.  He is agreeable to the plan of care.    Gus Felix, [facility]

## 2022-02-16 NOTE — ASSESSMENT & PLAN NOTE
Evaluated by cardiology, age and renal function preclude invasive eval. Maintain euvolemic state.  EF25%.  Currently seems compensated.     02/16--Request to establish care with new cardiologist, will schedule appt for 3-4 weeks from discharge.

## 2022-02-16 NOTE — ASSESSMENT & PLAN NOTE
>100k E.coli sensitive to Rocephin.  Day number 4 Rocephin.     02/16--Received 5 days of IV Rocephin, will discharge on 2 days of oral ABT for completeness, encouraged po intake

## 2022-02-16 NOTE — ASSESSMENT & PLAN NOTE
Improving SOB, decreased O2 requirement.   Covid positive on PCR  RA oxygen saturations improved.    Unable to receive Remdesivir due to kidney function  Baricitinib 1mg PO QD initiated, will complete 14 day course if able.  Decadron 6mg PO QD, started 2/11 Today is day 5.  Monitor in isolation  PT/OT      02/16--D/c on steroid taper x's 5 days, home o2 to use as needed, continue to wear a mask for the next four days.

## 2022-02-16 NOTE — ASSESSMENT & PLAN NOTE
Chronic, variable  Home medicine regimens of Coreg 6.25mg PO BID, Losartan 50mg PO QD, Nifedipine ER 30mg PO QD on med list; has not been filled recently  Resume Coreg 6.25mg PO BID due to tachycardia  Monitor BP trend    2/11: Variable BP trend; 90s-170s systolic. Continue Coreg 6.25mg PO BID at this time. Alterations to regimens as indicated    02/16--BP slightly elevated with current regimen, pt to continue present treatment but add Nifedipine back to regimen, monitor blood pressure closely and follow up with pcp in one week.

## 2022-02-16 NOTE — ASSESSMENT & PLAN NOTE
Subacute, variable  Oxygen sat room air 88%  Covid 19 positive  D-dimer 6.34; negative LE dopplers  History of cardiac stent 20+ years ago  ECHO, VQ lung scan pending   Check troponin, BNP    02/06--room air sats in the upper 90s, 97% today

## 2022-02-16 NOTE — ASSESSMENT & PLAN NOTE
Renal function improving. Monitoring.     02/16--sCr 1.33 which is elevated above baseline of 1.06, however, has improved since admission. Pt to follow up with pcp with repeat labs in one week.

## 2022-02-16 NOTE — PLAN OF CARE
Patient:   CANDACE TO            MRN: [id number]            FIN: [id number]               Age:   69 years     Sex:  Female     :  1947   Associated Diagnoses:   None   Author:   Lori Dwyer      Visit Information      Care Providers  Not recorded for selected visit.  Ancillary Services  Not recorded for selected visit.        Current Visit Date:  2017  Initial AWV Date:  10/03/2014  IPPE Date:  2013        History of Present Illness    The patient presents for well adult exam.  ADL's and Safety were reviewed.  No concerns found.  Please see copy of scanned form.    I have reviewed with the patient the completed health risk assessment and health history form and we have agreed on the following plans for identified risk factors. None found.  Please see copy of scanned form.    dexa due   last mammogram   lipid screening due 2018  diabetes screening done  and   tdap is due at pharmacy  hysterectomy  colonoscopy done  and due  uses estradiol for hot flashes, attempted to get her off these last year and she did not trial effexor but called for refill of her estradiol         Review of Systems   Constitutional:  Negative.    Eye:  Negative.    Ear/Nose/Mouth/Throat:  Negative.    Respiratory:  Negative.    Cardiovascular:  Negative.    Breast:  Negative.    Gastrointestinal:  Negative.    Genitourinary:  Negative.    Gynecologic:  Negative.    Hematology/Lymphatics:  Negative.    Endocrine:  Negative.    Immunologic:  Negative.    Musculoskeletal:  Negative.    Integumentary:  Negative.    Neurologic:  Negative.    Psychiatric:  Negative.       Health Status   Allergies:    Allergic Reactions (Selected)  Severity Not Documented  Sudafed (Nausea and dizziness)   Medications:  (Selected)   Prescriptions  Prescribed  Flexeril 10 mg oral tablet: 1 tab(s) ( 10 mg ), PO, TID, PRN: for spasm, # 30 EA, 5 Refill(s), Type: Maintenance, Pharmacy: [facility],    Problem: Adult Inpatient Plan of Care  Goal: Plan of Care Review  Outcome: Ongoing, Progressing  Goal: Patient-Specific Goal (Individualized)  Outcome: Ongoing, Progressing  Goal: Absence of Hospital-Acquired Illness or Injury  Outcome: Ongoing, Progressing  Goal: Optimal Comfort and Wellbeing  Outcome: Ongoing, Progressing  Goal: Readiness for Transition of Care  Outcome: Ongoing, Progressing     Problem: Fall Injury Risk  Goal: Absence of Fall and Fall-Related Injury  Outcome: Ongoing, Progressing     Problem: Fluid and Electrolyte Imbalance (Acute Kidney Injury/Impairment)  Goal: Fluid and Electrolyte Balance  Outcome: Ongoing, Progressing     Problem: Oral Intake Inadequate (Acute Kidney Injury/Impairment)  Goal: Optimal Nutrition Intake  Outcome: Ongoing, Progressing     Problem: Renal Function Impairment (Acute Kidney Injury/Impairment)  Goal: Effective Renal Function  Outcome: Ongoing, Progressing      1 tab(s) po tid,PRN:for spasm  estradiol 1 mg oral tablet: 1 tab(s) ( 1 mg ), po, daily, # 30 tab(s), 0 Refill(s), Type: Maintenance, Pharmacy: [facility] [place] [facility], Pt needs to be seen for any further refills. 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     Functional Assessment  Not recorded for selected visit.     Geriatric Depression Screen  Not recorded for selected visit.     Hearing Screen  Not recorded for selected visit.     Home Safety Screen  Not recorded for selected visit.     Vision Screen  Not recorded for selected visit.        Histories   Past Medical History:    Active  Migraine (89280274): Onset in  at 48 years.  H/O: CVA (V12.54): Onset on 1995 at 48 years.  Comments:  10/28/2010 CDT 9:55 AM CDT - Corazon Chavira  Day unknown.  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  Comments:  10/28/2010 10:03 AM - Corazon Chavira  Hyperplastic polyp.  Hysterectomy (371678274) in  at 36 Years.  Right foot foreign body removal x 2 in  at 36 Years.  Tonsillectomy (221813598).  Laparoscopy x 2.  Comments:  10/28/2010 10:03 AM - Corazon Chavira  For endometriosis.   Social History:         Alcohol Assessment            with dinner      Tobacco Assessment            Never      Substance Abuse Assessment            Never      Employment and Education Assessment            Retired      Home and Environment Assessment            Marital status: .  Spouse/Partner name: lucas .      Exercise and Physical Activity Assessment            Exercise frequency: Daily.  Exercise type: Walking, gardening.        Physical Examination   General:  Alert and oriented, No acute distress.    Eye:  Pupils are equal, round and reactive to light, Intact accommodation, Normal conjunctiva, Vision unchanged.         Periorbital area: Within normal limits.    HENT:  Normocephalic, Tympanic membranes are clear, Normal hearing, Oral mucosa is moist, No pharyngeal erythema, No sinus tenderness.    Neck:  Supple, Non-tender, No lymphadenopathy, No thyromegaly.    Respiratory:  Lungs are clear to auscultation, Respirations are non-labored, No chest wall tenderness.    Cardiovascular:  Normal rate, Regular rhythm, No murmur, No edema.    Gastrointestinal:  Soft, Non-tender, Non-distended, Normal bowel sounds, No organomegaly.    Genitourinary:  No costovertebral angle tenderness.    Lymphatics:  No lymphadenopathy neck, axilla, groin.    Musculoskeletal:  Normal range of motion, Normal strength, No swelling.    Integumentary:  Warm, Dry, Pink.    Neurologic:  Alert, Oriented.    Psychiatric:  Cooperative, Appropriate mood & affect.       Review / Management   Results review:  FOBT cards given to pt  she would like to do diabetes screening next yr with lipid panel.    Diabetes Mellitus   Additional Information: does not want screening this yr.      Impression and Plan   Plan:       Refer to: will send her to Viktoria Holland CNM to discuss bioidentical hormone replacement.    Preventative services needed::          Bone mass measurements: No.         Colorectal cancer screening: Yes, refuses.         Diabetes screening:  Yes, refuses.         Immunizations: Yes, refuses.         Mammography: Yes, due and she will consider this.         PAP: No.    Patient Instructions:       Counseled: Patient.    Summary:  discussed preventative screnning recommendations  she declines repeat colonoscopy claiming bad experience with the first one, will do FOBT  she has not wanted repeat mammography but will consider one this yr so I have ordered this  no pap done  declines Tdap, pneumovax and zostavax  wants estradiol refilled, discussed dangers of hormone in 69 yoa female, has not watned to trial effexor  has nurse friend who only like naturopathic medication and advises against most screenings, vaccinations, and medications  Nia is willing to meet with Vkitoria Quarles to discuss other options.    Orders     Orders (Selected)   Prescriptions  Prescribed  estradiol 1 mg oral tablet: 1 tab(s) ( 1 mg ), po, daily, # 90 tab(s), 3 Refill(s), Type: Maintenance, Pharmacy: [facility] - [place], Pt needs to be seen for any further refills. Thanks!, 1 tab(s) po daily.

## 2022-02-16 NOTE — PLAN OF CARE
[facility] - [facility] Telemetry  Discharge Final Note    Primary Care Provider: Salinas Bird DO    Expected Discharge Date: 2/16/2022    Final Discharge Note (most recent)     Final Note - 02/16/22 1329        Final Note    Assessment Type Final Discharge Note     Anticipated Discharge Disposition Home or Self Care        Post-Acute Status    Discharge Delays None known at this time                 Important Message from Medicare  Important Message from Medicare regarding Discharge Appeal Rights: Given to patient/caregiver,Explained to patient/caregiver,Signed/date by patient/caregiver     Date IMM was signed: 02/11/22  Time IMM was signed: 1002    Contact Info     Zac Ambrocio DO   Specialty: Family Medicine, Emergency Medicine    [address]  [place] [address]   Phone: [phone]       Next Steps: Follow up in 1 week(s)    Instructions: Hospital follow up    Winifred Leslie MD   Specialty: Interventional Cardiology, Cardiology    [address] [place] [address]   Phone: [phone]       Next Steps: Follow up in 3 week(s)    Instructions: Hospital follow up, establish care    Kaz Nickerson Jr, MD   Specialty: Urology    [address] [place] [address]   Phone: [phone]       Next Steps: Follow up in 4 week(s)    Instructions: Hospital follow up, establish care      pt to d/c home today. No further needs.

## 2022-02-17 ENCOUNTER — TELEPHONE (OUTPATIENT)
Dept: FAMILY MEDICINE | Facility: CLINIC | Age: 87
End: 2022-02-17
Payer: MEDICARE

## 2022-02-17 RX ORDER — ERGOCALCIFEROL 1.25 MG/1
50000 CAPSULE ORAL
COMMUNITY

## 2022-02-17 NOTE — TELEPHONE ENCOUNTER
2/17/22-pt son, Amy, returned call. He states he lives in [place] and his father/pt lives alone. States he does try to attend to his affairs through portal and speaks to him over the phone.  Reports he is concerned about his compliance with his medications. He states he is unaware if he picked up his new meds from pharmacy. Will call to verify. He also states he thinks he needs home health for med management and was disappointed that it was not offered at discharge.He would like for him to be evaluated at his follow up appt. reviewed all discharge medications with him and discussed the new meds to be picked up and the losartan to hold until he sees pcp for follow up. He states he knows he takes the norco but is unsure about any of the others. He also states it may be helpful if he had the pill packs but doesn't know if [facility] would do that for him. Informed him of his f/u appt. He states he usually drives himself to the appts. Instructed to tell pt to bring all meds to follow up visit and to call office for questions/concerns. Vu. Will continue to attempt to contact patient. Arely    2/17/22-Called [facility]. Pharmacist states that someone picked up his new rx yesterday. He also states he picked up the norco on 2/5 and vit D on 11/21. He has no record of the coreg, trazodone, nifedipine, simvastatin. These meds were last sent to [facility] which is now closed. Arely    2/17/22-12:24-called and spoke with pt son Amy.Informed him that I spoke with  pharmacy and  someone had picked up new Rx for pt. Also that they have no record of coreg,trazodone nifedipine or simvistatin. Only the vitamin D and Norco. Also that they do not do the monthly pill packs but that Mr Grewal in [place] does. Son also states his father sees Dr. Langley with cardiology and will call his office to make a hospital follow up for him.Arely

## 2022-02-21 DIAGNOSIS — E78.5 HYPERLIPIDEMIA, UNSPECIFIED HYPERLIPIDEMIA TYPE: ICD-10-CM

## 2022-02-21 NOTE — TELEPHONE ENCOUNTER
Alycia Johnson, ROBERT  Caller: Unspecified (Today,  9:45 AM)  Patient needs Nifeditine 30mg   Simvastatin 80mg   Trazadone 50mg   Called into [place] [facility]   Farhana [phone]

## 2022-02-22 RX ORDER — TRAZODONE HYDROCHLORIDE 50 MG/1
50 TABLET ORAL NIGHTLY
Qty: 30 TABLET | Refills: 1 | OUTPATIENT
Start: 2022-02-22 | End: 2022-02-23 | Stop reason: SDUPTHER

## 2022-02-22 RX ORDER — SIMVASTATIN 80 MG/1
80 TABLET, FILM COATED ORAL NIGHTLY
Qty: 30 TABLET | Refills: 2 | Status: SHIPPED | OUTPATIENT
Start: 2022-02-22 | End: 2022-07-21 | Stop reason: SDUPTHER

## 2022-02-23 ENCOUNTER — OFFICE VISIT (OUTPATIENT)
Dept: FAMILY MEDICINE | Facility: CLINIC | Age: 87
End: 2022-02-23
Payer: MEDICARE

## 2022-02-23 VITALS
TEMPERATURE: 98 F | WEIGHT: 148 LBS | OXYGEN SATURATION: 93 % | HEART RATE: 68 BPM | DIASTOLIC BLOOD PRESSURE: 100 MMHG | SYSTOLIC BLOOD PRESSURE: 171 MMHG | BODY MASS INDEX: 20.64 KG/M2

## 2022-02-23 DIAGNOSIS — U07.1 COVID-19: Primary | ICD-10-CM

## 2022-02-23 DIAGNOSIS — I10 HYPERTENSION, UNSPECIFIED TYPE: ICD-10-CM

## 2022-02-23 DIAGNOSIS — Z09 HOSPITAL DISCHARGE FOLLOW-UP: Primary | ICD-10-CM

## 2022-02-23 DIAGNOSIS — U09.9 POST COVID-19 CONDITION, UNSPECIFIED: ICD-10-CM

## 2022-02-23 PROCEDURE — 99495 TRANSJ CARE MGMT MOD F2F 14D: CPT | Mod: GC,,, | Performed by: FAMILY MEDICINE

## 2022-02-23 PROCEDURE — 99495 TCM SERVICES (MODERATE COMPLEXITY): ICD-10-PCS | Mod: GC,,, | Performed by: FAMILY MEDICINE

## 2022-02-23 PROCEDURE — 1111F DSCHRG MED/CURRENT MED MERGE: CPT | Mod: ,,, | Performed by: FAMILY MEDICINE

## 2022-02-23 PROCEDURE — 1111F PR DISCHARGE MEDS RECONCILED W/ CURRENT OUTPATIENT MED LIST: ICD-10-PCS | Mod: ,,, | Performed by: FAMILY MEDICINE

## 2022-02-23 RX ORDER — TRAZODONE HYDROCHLORIDE 50 MG/1
50 TABLET ORAL NIGHTLY
Qty: 30 TABLET | Refills: 1 | OUTPATIENT
Start: 2022-02-23 | End: 2022-02-28 | Stop reason: SDUPTHER

## 2022-02-23 RX ORDER — LOSARTAN POTASSIUM 50 MG/1
50 TABLET ORAL DAILY
Qty: 30 TABLET | Refills: 0
Start: 2022-02-23 | End: 2022-02-28 | Stop reason: SDUPTHER

## 2022-02-23 NOTE — PROGRESS NOTES
"Transitional Care Note  Subjective:       Patient ID: Coleman Lares is a 89 y.o. male.  Chief Complaint: Hospital Follow Up and Referral (Needs a referral to a new cardiologist because his previous physician retired )    Family and/or Caretaker present at visit?  No.  Diagnostic tests reviewed/disposition: No diagnosic tests pending after this hospitalization.  Disease/illness education: All COVID-19 Symptoms have resolved  Home health/community services discussion/referrals: Patient does not have home health established from hospital visit.  They do need home health.  If needed, we will set up home health for the patient.   Establishment or re-establishment of referral orders for community resources: No other necessary community resources.   Discussion with other health care providers: Follow up with Dr. Leslie as scheduled.   Pt is an 89 yo male presenting today with a cc of "Hospitalization follow-up." The patient states that he feels as though he has completely resolved since recent hospitalization for COVID-19 infection. He states that he is no longer experiencing SOB or cough. I discussed home health with this patient for continued exercise needs, he agrees and states that he will call back with the preferred home health service. All medications were reviewed, all diagnostic test were discussed from recent hospitilizations. Patient has no other acute complaints. Patients BP was mildly elevated today, but he states that it isnt normally elevated at home, will instruct patient to check BP at home for the next week and log. Consider increasing BP meds if necessary.    Review of Systems   Constitutional: Negative for chills, diaphoresis, fatigue and fever.   Respiratory: Negative for cough, chest tightness, shortness of breath and wheezing.    Cardiovascular: Negative for chest pain, palpitations and leg swelling.   Gastrointestinal: Negative for abdominal pain, constipation, diarrhea, nausea and vomiting. "   Neurological: Negative for dizziness, speech difficulty, weakness, light-headedness and headaches.   All other systems reviewed and are negative.      Objective:      Physical Exam  Vitals and nursing note reviewed.   Constitutional:       General: He is not in acute distress.     Appearance: Normal appearance. He is normal weight. He is not ill-appearing or diaphoretic.   HENT:      Head: Normocephalic and atraumatic.      Nose: Nose normal. No congestion or rhinorrhea.      Mouth/Throat:      Mouth: Mucous membranes are moist.      Pharynx: Oropharynx is clear. No oropharyngeal exudate or posterior oropharyngeal erythema.   Eyes:      General: No scleral icterus.     Extraocular Movements: Extraocular movements intact.      Conjunctiva/sclera: Conjunctivae normal.      Pupils: Pupils are equal, round, and reactive to light.   Cardiovascular:      Rate and Rhythm: Normal rate and regular rhythm.      Pulses: Normal pulses.      Heart sounds: Normal heart sounds. No murmur heard.    No friction rub. No gallop.   Pulmonary:      Effort: Pulmonary effort is normal. No respiratory distress.      Breath sounds: Normal breath sounds. No wheezing, rhonchi or rales.   Abdominal:      General: Abdomen is flat. Bowel sounds are normal. There is no distension.      Palpations: Abdomen is soft.      Tenderness: There is no abdominal tenderness. There is no guarding or rebound.   Musculoskeletal:         General: Normal range of motion.      Cervical back: Normal range of motion and neck supple.      Right lower leg: No edema.      Left lower leg: No edema.   Skin:     General: Skin is warm and dry.      Capillary Refill: Capillary refill takes less than 2 seconds.      Coloration: Skin is not jaundiced.   Neurological:      General: No focal deficit present.      Mental Status: He is alert and oriented to person, place, and time. Mental status is at baseline.   Psychiatric:         Mood and Affect: Mood normal.          Behavior: Behavior normal.         Thought Content: Thought content normal.         Judgment: Judgment normal.         Assessment:       1. Hospital discharge follow-up        Plan:        1) COVID-19 Follow up   - Patient to call back with [facility], will send referral once contacted      2) Hypertension   - Log Blood pressures at home 2x daily for at least 1 week. Patient to call back if BP's remain elevated   - Consider increasing BP meds if elevations remain    Patient to follow up in 1 month for reassessment.

## 2022-03-14 NOTE — PROGRESS NOTES
PCP: Zac Ambrocio DO    Referring Provider:     Subjective:   Coleman Lares is a 89 y.o. male, former smoker, quit in 2000, with hx of COPD, HTN, HLD,  who presents for evaluation of cardiomyopathy.     He was hospitalized with COVID and found to LV dysfunction with EF of 25%.      Denies any chest pains, dyspnea, syncope.  He is not aware of his diagnosis of heart failure.         EKG 2/11/22:  Sinus rhythm with Premature supraventricular complexes                           Left axis deviation.   Right bundle branch block                           LVH with QRS widening                           Cannot rule out Septal infarct ,age undetermined                           T wave abnormality, consider inferolateral ischemia   Echo 2/10/22:  Sinus tachycardia                         The left ventricle is normal in size with mild concentric hypertrophy and severely decreased systolic function. EF 25%.                          Mild-to-moderate aortic regurgitation.          Past Surgical History:   Procedure Laterality Date    CORONARY ANGIOPLASTY WITH STENT PLACEMENT      FOOT SURGERY  2021    Dr Jackson    HIP SURGERY      LITHOTRIPSY        Family History   Problem Relation Age of Onset    Cancer Mother     Cancer Sister     Cancer Brother       Social History     Socioeconomic History    Marital status: Single   Tobacco Use    Smoking status: Former Smoker    Smokeless tobacco: Never Used   Substance and Sexual Activity    Alcohol use: Never    Drug use: Never    Sexual activity: Not Currently           Lab Results   Component Value Date     02/16/2022    K 4.8 02/16/2022     02/16/2022    CO2 26 02/16/2022    BUN 53 (H) 02/16/2022    CREATININE 1.33 (H) 02/16/2022    CALCIUM 8.6 02/16/2022    ANIONGAP 16 02/16/2022    ESTGFRAFRICA 58 04/12/2020    EGFRNONAA 54 (L) 02/16/2022       Lab Results   Component Value Date    CHOL 91 02/11/2022    CHOL 156 06/16/2021     Lab Results    Component Value Date    HDL 13 (L) 02/11/2022    HDL 58 06/16/2021     Lab Results   Component Value Date    LDLCALC 54 02/11/2022    LDLCALC 78 06/16/2021     Lab Results   Component Value Date    TRIG 122 02/11/2022    TRIG 98 06/16/2021     Lab Results   Component Value Date    CHOLHDL 7.0 02/11/2022    CHOLHDL 2.7 06/16/2021       Lab Results   Component Value Date    WBC 22.10 (H) 02/16/2022    HGB 14.4 02/16/2022    HCT 43.9 02/16/2022    MCV 91.5 02/16/2022     02/16/2022           Current Outpatient Medications:     acetaminophen (TYLENOL) 325 MG tablet, Take 325 mg by mouth every 6 (six) hours as needed for Pain., Disp: , Rfl:     aspirin (ECOTRIN) 81 MG EC tablet, Take 1 tablet (81 mg total) by mouth once daily., Disp: 30 tablet, Rfl: 5    carvediloL (COREG) 6.25 MG tablet, Take 1 tablet (6.25 mg total) by mouth once daily., Disp: 30 tablet, Rfl: 2    cholecalciferol, vitamin D3, 10 mcg (400 unit) Cap, Take 2 capsules (800 Units total) by mouth once daily., Disp: 60 capsule, Rfl: 5    ergocalciferol (ERGOCALCIFEROL) 50,000 unit Cap, Take 50,000 Units by mouth every 7 days., Disp: , Rfl:     HYDROcodone-acetaminophen (NORCO)  mg per tablet, Take 1 tablet by mouth every 8 (eight) hours., Disp: 90 tablet, Rfl: 0    [START ON 4/6/2022] HYDROcodone-acetaminophen (NORCO)  mg per tablet, Take 1 tablet by mouth every 8 (eight) hours., Disp: 90 tablet, Rfl: 0    losartan (COZAAR) 50 MG tablet, Take 1 tablet (50 mg total) by mouth once daily. Cont to hold pending hospital follow up, Disp: 30 tablet, Rfl: 0    NIFEdipine (ADALAT CC) 30 MG TbSR, Take 1 tablet (30 mg total) by mouth once daily., Disp: 90 tablet, Rfl: 0    simvastatin (ZOCOR) 80 MG tablet, Take 1 tablet (80 mg total) by mouth every evening. Take 1/2 tablet once at night., Disp: 30 tablet, Rfl: 2    tamsulosin (FLOMAX) 0.4 mg Cap, Take 1 capsule (0.4 mg total) by mouth once daily., Disp: 30 capsule, Rfl: 0    traZODone  "(DESYREL) 50 MG tablet, Take 1 tablet (50 mg total) by mouth every evening., Disp: 30 tablet, Rfl: 1       Review of Systems   Respiratory: Negative for cough and shortness of breath.    Cardiovascular: Negative for chest pain, palpitations, orthopnea, claudication, leg swelling and PND.         Objective:   /76 (BP Location: Left arm, Patient Position: Sitting)   Pulse 94   Ht 5' 11" (1.803 m)   Wt 66.7 kg (147 lb)   SpO2 (!) 94%   BMI 20.50 kg/m²     Physical Exam  Cardiovascular:      Rate and Rhythm: Normal rate and regular rhythm.      Pulses: Normal pulses.      Heart sounds: Normal heart sounds. No murmur heard.  Pulmonary:      Effort: Pulmonary effort is normal.      Breath sounds: Normal breath sounds.   Musculoskeletal:         General: No swelling.      Cervical back: Neck supple.   Neurological:      Mental Status: He is alert and oriented to person, place, and time.           Assessment:     1. Arteriosclerosis of coronary artery     2. Primary hypertension     3. Mixed hyperlipidemia     4. Ischemic cardiomyopathy     5. Shortness of breath           Plan:     Problem List Items Addressed This Visit        Cardiac/Vascular    Hypertension    Arteriosclerosis of coronary artery - Primary    Hyperlipidemia    Cardiomyopathy       Other    Shortness of breath         #Chronic systolic HF  Denies chest pain or shortness of breath   Echo 2/2022 showed EF 25%.   This is a new diagnosis, he was previously followed with Dr. Langley.  Is looking to switch cardiologists as his previous cardiologist is retiring.  He appears euvolemic on exam, denies any dyspnea, no lower extremity edema.  Currently normotensive with normal heart rate.    Continue carvedilol  He is not on ACE-inhibitor due to CKD.  His blood pressure is currently well controlled on the nifedipine, given his advanced age, I do not want to make a change to hydralazine for compliance issues.  His daughter-in-law is maintaining his " medications at this time.    # CAD  Has a history of PCI in year 2000  Denies any further procedures since.  Denies any chest pains.      FU in 3 months

## 2022-03-15 ENCOUNTER — OFFICE VISIT (OUTPATIENT)
Dept: CARDIOLOGY | Facility: CLINIC | Age: 87
End: 2022-03-15
Payer: MEDICARE

## 2022-03-15 VITALS
HEIGHT: 71 IN | WEIGHT: 147 LBS | HEART RATE: 94 BPM | OXYGEN SATURATION: 94 % | BODY MASS INDEX: 20.58 KG/M2 | DIASTOLIC BLOOD PRESSURE: 76 MMHG | SYSTOLIC BLOOD PRESSURE: 132 MMHG

## 2022-03-15 DIAGNOSIS — R06.02 SHORTNESS OF BREATH: ICD-10-CM

## 2022-03-15 DIAGNOSIS — I25.10 ARTERIOSCLEROSIS OF CORONARY ARTERY: Primary | ICD-10-CM

## 2022-03-15 DIAGNOSIS — I10 PRIMARY HYPERTENSION: ICD-10-CM

## 2022-03-15 DIAGNOSIS — I25.5 ISCHEMIC CARDIOMYOPATHY: ICD-10-CM

## 2022-03-15 DIAGNOSIS — E78.2 MIXED HYPERLIPIDEMIA: ICD-10-CM

## 2022-03-15 PROCEDURE — 99214 OFFICE O/P EST MOD 30 MIN: CPT | Mod: PBBFAC | Performed by: INTERNAL MEDICINE

## 2022-03-15 PROCEDURE — 1111F PR DISCHARGE MEDS RECONCILED W/ CURRENT OUTPATIENT MED LIST: ICD-10-PCS | Mod: CPTII,,, | Performed by: INTERNAL MEDICINE

## 2022-03-15 PROCEDURE — 1111F DSCHRG MED/CURRENT MED MERGE: CPT | Mod: CPTII,,, | Performed by: INTERNAL MEDICINE

## 2022-03-15 PROCEDURE — 99214 PR OFFICE/OUTPT VISIT, EST, LEVL IV, 30-39 MIN: ICD-10-PCS | Mod: S$PBB,,, | Performed by: INTERNAL MEDICINE

## 2022-03-15 PROCEDURE — 1159F MED LIST DOCD IN RCRD: CPT | Mod: CPTII,,, | Performed by: INTERNAL MEDICINE

## 2022-03-15 PROCEDURE — 99214 OFFICE O/P EST MOD 30 MIN: CPT | Mod: S$PBB,,, | Performed by: INTERNAL MEDICINE

## 2022-03-15 PROCEDURE — 1159F PR MEDICATION LIST DOCUMENTED IN MEDICAL RECORD: ICD-10-PCS | Mod: CPTII,,, | Performed by: INTERNAL MEDICINE

## 2022-03-15 RX ORDER — CARVEDILOL 6.25 MG/1
6.25 TABLET ORAL DAILY
Qty: 30 TABLET | Refills: 2 | Status: SHIPPED | OUTPATIENT
Start: 2022-03-15 | End: 2022-05-31 | Stop reason: SDUPTHER

## 2022-03-15 RX ORDER — TAMSULOSIN HYDROCHLORIDE 0.4 MG/1
0.4 CAPSULE ORAL DAILY
Qty: 30 CAPSULE | Refills: 0 | Status: SHIPPED | OUTPATIENT
Start: 2022-03-15 | End: 2022-03-18 | Stop reason: SDUPTHER

## 2022-03-15 NOTE — TELEPHONE ENCOUNTER
----- Message from Spencer Gurrola sent at 3/15/2022  3:43 PM CDT -----  Regarding: Med refills  Tamsulosin 0.4mg cap  Carvedilol 6.25mg    Dayana Cole    Please and thanks!!

## 2022-03-18 DIAGNOSIS — I10 HYPERTENSION, UNSPECIFIED TYPE: ICD-10-CM

## 2022-03-18 RX ORDER — TAMSULOSIN HYDROCHLORIDE 0.4 MG/1
0.4 CAPSULE ORAL DAILY
Qty: 90 CAPSULE | Refills: 0 | Status: SHIPPED | OUTPATIENT
Start: 2022-03-18 | End: 2022-08-15 | Stop reason: SDUPTHER

## 2022-03-18 RX ORDER — LOSARTAN POTASSIUM 50 MG/1
50 TABLET ORAL DAILY
Qty: 90 TABLET | Refills: 0
Start: 2022-03-18 | End: 2022-04-26

## 2022-03-18 RX ORDER — NIFEDIPINE 30 MG/1
30 TABLET, FILM COATED, EXTENDED RELEASE ORAL DAILY
Qty: 90 TABLET | Refills: 0 | Status: SHIPPED | OUTPATIENT
Start: 2022-03-18 | End: 2022-06-16

## 2022-03-18 NOTE — TELEPHONE ENCOUNTER
----- Message from Spencer Grurola sent at 3/17/2022  4:58 PM CDT -----  Regarding: Med refills  Soooo they picked up the prescriptions we just sent, got home, and realized he only has 3 left of each of these. So they'll also need to be refilled. Sorry....    Tamsulosin  Losartan  Nifedipine    Dayana Cole    Thank you!!!!

## 2022-03-28 ENCOUNTER — PATIENT MESSAGE (OUTPATIENT)
Dept: FAMILY MEDICINE | Facility: CLINIC | Age: 87
End: 2022-03-28
Payer: MEDICARE

## 2022-03-31 ENCOUNTER — OFFICE VISIT (OUTPATIENT)
Dept: FAMILY MEDICINE | Facility: CLINIC | Age: 87
End: 2022-03-31
Payer: MEDICARE

## 2022-03-31 VITALS
HEART RATE: 84 BPM | OXYGEN SATURATION: 92 % | SYSTOLIC BLOOD PRESSURE: 113 MMHG | HEIGHT: 71 IN | BODY MASS INDEX: 20.86 KG/M2 | DIASTOLIC BLOOD PRESSURE: 68 MMHG | WEIGHT: 149 LBS | TEMPERATURE: 97 F | RESPIRATION RATE: 18 BRPM

## 2022-03-31 DIAGNOSIS — I42.9 CARDIOMYOPATHY, UNSPECIFIED TYPE: ICD-10-CM

## 2022-03-31 DIAGNOSIS — N18.9 CHRONIC KIDNEY DISEASE, UNSPECIFIED CKD STAGE: ICD-10-CM

## 2022-03-31 DIAGNOSIS — I10 PRIMARY HYPERTENSION: ICD-10-CM

## 2022-03-31 PROCEDURE — 99212 PR OFFICE/OUTPT VISIT, EST, LEVL II, 10-19 MIN: ICD-10-PCS | Mod: GC,,, | Performed by: FAMILY MEDICINE

## 2022-03-31 PROCEDURE — 99212 OFFICE O/P EST SF 10 MIN: CPT | Mod: GC,,, | Performed by: FAMILY MEDICINE

## 2022-03-31 NOTE — PROGRESS NOTES
Subjective:       Patient ID: Coleman Lares is a 89 y.o. male.    Chief Complaint: Follow-up    Pt is an 89 y.o.  male presenting for 1 month follow up. He is a  former smoker, quit in 2000, with hx of COPD, HTN and HLD. Pt is accompanied by his daughter at the time of my exam. Pt was last seen by Dr. Solorzano and was seen Dr. Ann on his penultimate visit. Of note, he was also recently by Dr. Leslie and given a formal diagnosis of heart failure.     Pt reports no complaints today. He denies chest pain, worsening SOB, palpitations, paroxysmal nocturnal dyspnea,abd pain, dysuria, hematuria and hematochezia. He states that home health has been to see him and they have been regularly measuring his vitals. Pt also reports that his son lives next door to him and that his girlfriend regularly checks up on him. Although physical therapy has not followed up with him, he reports that he is able to exercise on his own and does not need them at this time.      He did not bring his BP log today as was instructed by Dr. Solorzano. However, he reports that the measurements from home health have been within normal limits.         Current Outpatient Medications:     acetaminophen (TYLENOL) 325 MG tablet, Take 325 mg by mouth every 6 (six) hours as needed for Pain., Disp: , Rfl:     aspirin (ECOTRIN) 81 MG EC tablet, Take 1 tablet (81 mg total) by mouth once daily., Disp: 30 tablet, Rfl: 5    carvediloL (COREG) 6.25 MG tablet, Take 1 tablet (6.25 mg total) by mouth once daily., Disp: 30 tablet, Rfl: 2    HYDROcodone-acetaminophen (NORCO)  mg per tablet, Take 1 tablet by mouth every 8 (eight) hours., Disp: 90 tablet, Rfl: 0    losartan (COZAAR) 50 MG tablet, Take 1 tablet (50 mg total) by mouth once daily. Cont to hold pending hospital follow up, Disp: 90 tablet, Rfl: 0    NIFEdipine (ADALAT CC) 30 MG TbSR, Take 1 tablet (30 mg total) by mouth once daily., Disp: 90 tablet, Rfl: 0    simvastatin (ZOCOR) 80 MG tablet,  Take 1 tablet (80 mg total) by mouth every evening. Take 1/2 tablet once at night., Disp: 30 tablet, Rfl: 2    tamsulosin (FLOMAX) 0.4 mg Cap, Take 1 capsule (0.4 mg total) by mouth once daily., Disp: 90 capsule, Rfl: 0    traZODone (DESYREL) 50 MG tablet, Take 1 tablet (50 mg total) by mouth every evening., Disp: 30 tablet, Rfl: 1    cholecalciferol, vitamin D3, 10 mcg (400 unit) Cap, Take 2 capsules (800 Units total) by mouth once daily., Disp: 60 capsule, Rfl: 5    ergocalciferol (ERGOCALCIFEROL) 50,000 unit Cap, Take 50,000 Units by mouth every 7 days., Disp: , Rfl:     [START ON 4/6/2022] HYDROcodone-acetaminophen (NORCO)  mg per tablet, Take 1 tablet by mouth every 8 (eight) hours., Disp: 90 tablet, Rfl: 0    Review of patient's allergies indicates:  No Known Allergies    Past Medical History:   Diagnosis Date    Aortic regurgitation     Back pain     CAD (coronary artery disease)     Chronic back pain     Constipation     COPD (chronic obstructive pulmonary disease)     Generalized OA     Hyperlipidemia     Hypertension     Osteopenia determined by x-ray     Pulmonary stenosis        Past Surgical History:   Procedure Laterality Date    CORONARY ANGIOPLASTY WITH STENT PLACEMENT      FOOT SURGERY  2021    Dr Jackson    HIP SURGERY      LITHOTRIPSY         Family History   Problem Relation Age of Onset    Cancer Mother     Cancer Sister     Cancer Brother        Social History     Tobacco Use    Smoking status: Former Smoker    Smokeless tobacco: Never Used   Substance Use Topics    Alcohol use: Never    Drug use: Never       Review of Systems   Constitutional: Negative for chills, diaphoresis and fever.   HENT: Negative for sore throat.    Eyes: Negative for visual disturbance.   Respiratory: Negative for cough and shortness of breath.    Cardiovascular: Negative for chest pain and palpitations.   Gastrointestinal: Negative for abdominal pain, diarrhea, nausea and vomiting.    Genitourinary: Negative for dysuria and hematuria.   Musculoskeletal: Negative for arthralgias and leg pain.   Integumentary:  Negative for rash.   Neurological: Negative for dizziness, light-headedness, numbness and headaches.         Current Medications:   Medication List with Changes/Refills   Current Medications    ACETAMINOPHEN (TYLENOL) 325 MG TABLET    Take 325 mg by mouth every 6 (six) hours as needed for Pain.       Start Date: --        End Date: --    ASPIRIN (ECOTRIN) 81 MG EC TABLET    Take 1 tablet (81 mg total) by mouth once daily.       Start Date: 8/3/2021  End Date: --    CARVEDILOL (COREG) 6.25 MG TABLET    Take 1 tablet (6.25 mg total) by mouth once daily.       Start Date: 3/15/2022 End Date: 6/13/2022    CHOLECALCIFEROL, VITAMIN D3, 10 MCG (400 UNIT) CAP    Take 2 capsules (800 Units total) by mouth once daily.       Start Date: 11/16/2021End Date: 5/15/2022    ERGOCALCIFEROL (ERGOCALCIFEROL) 50,000 UNIT CAP    Take 50,000 Units by mouth every 7 days.       Start Date: --        End Date: --    HYDROCODONE-ACETAMINOPHEN (NORCO)  MG PER TABLET    Take 1 tablet by mouth every 8 (eight) hours.       Start Date: 3/7/2022  End Date: 4/6/2022    HYDROCODONE-ACETAMINOPHEN (NORCO)  MG PER TABLET    Take 1 tablet by mouth every 8 (eight) hours.       Start Date: 4/6/2022  End Date: 5/6/2022    LOSARTAN (COZAAR) 50 MG TABLET    Take 1 tablet (50 mg total) by mouth once daily. Cont to hold pending hospital follow up       Start Date: 3/18/2022 End Date: 6/16/2022    NIFEDIPINE (ADALAT CC) 30 MG TBSR    Take 1 tablet (30 mg total) by mouth once daily.       Start Date: 3/18/2022 End Date: 6/16/2022    SIMVASTATIN (ZOCOR) 80 MG TABLET    Take 1 tablet (80 mg total) by mouth every evening. Take 1/2 tablet once at night.       Start Date: 2/22/2022 End Date: --    TAMSULOSIN (FLOMAX) 0.4 MG CAP    Take 1 capsule (0.4 mg total) by mouth once daily.       Start Date: 3/18/2022 End Date: 6/16/2022  "   TRAZODONE (DESYREL) 50 MG TABLET    Take 1 tablet (50 mg total) by mouth every evening.       Start Date: 2/28/2022 End Date: --            Objective:        Vitals:    03/31/22 0929   BP: 113/68   BP Location: Right arm   Patient Position: Sitting   Pulse: 84   Resp: 18   Temp: 97 °F (36.1 °C)   TempSrc: Temporal   SpO2: (!) 92%   Weight: 67.6 kg (149 lb)   Height: 5' 11" (1.803 m)       Physical Exam  Constitutional:       General: He is not in acute distress.     Appearance: He is not ill-appearing, toxic-appearing or diaphoretic.      Comments: Pt appears elderly and frail    HENT:      Head: Normocephalic and atraumatic.      Right Ear: External ear normal.      Left Ear: External ear normal.      Nose: Nose normal.   Eyes:      General: No scleral icterus.        Right eye: No discharge.         Left eye: No discharge.      Extraocular Movements: Extraocular movements intact.   Cardiovascular:      Rate and Rhythm: Normal rate and regular rhythm.      Pulses: Normal pulses.      Heart sounds: Normal heart sounds. No murmur heard.    No friction rub. No gallop.   Pulmonary:      Effort: Pulmonary effort is normal. No respiratory distress.      Breath sounds: No stridor. No wheezing, rhonchi or rales.   Chest:      Chest wall: No tenderness.   Abdominal:      Palpations: Abdomen is soft.      Tenderness: There is no abdominal tenderness. There is no guarding.   Musculoskeletal:         General: No swelling.      Right lower leg: No edema.      Left lower leg: No edema.   Skin:     General: Skin is warm and dry.   Neurological:      Mental Status: He is alert and oriented to person, place, and time.      Comments: Pt with slow gait. Pt seen ambulating with a rolling worker.                Lab Results   Component Value Date    WBC 22.10 (H) 02/16/2022    HGB 14.4 02/16/2022    HCT 43.9 02/16/2022     02/16/2022    CHOL 91 02/11/2022    TRIG 122 02/11/2022    HDL 13 (L) 02/11/2022    ALT 7 (L) 02/10/2022 "    AST 9 (L) 02/10/2022     02/16/2022    K 4.8 02/16/2022     02/16/2022    CREATININE 1.33 (H) 02/16/2022    BUN 53 (H) 02/16/2022    CO2 26 02/16/2022    TSH 0.306 (L) 02/10/2022    INR 1.15 03/23/2020      Assessment:       1. Primary hypertension    2. Cardiomyopathy, unspecified type    3. Chronic kidney disease, unspecified CKD stage        Plan:         Problem List Items Addressed This Visit        Cardiac/Vascular    Hypertension     Pt appears to be normotensive today. Home health will continue to regularly check his blood pressure. Pt to follow up with me as well as his cardiologist in approx. 3 months.            Cardiomyopathy     Pt appears euvolemic on exam today. Pt is scheduled to see his cardiologist in approx. 3 months.               Renal/    CKD (chronic kidney disease)     Pt encouraged to keep well hydrated and avoid nephrotoxic substances.                    Follow up in about 3 months (around 6/30/2022).    Juan Collins DO     Instructed patient that if symptoms fail to improve or worsen patient should seek immediate medical attention or report to the nearest emergency department. Patient expressed verbal agreement and understanding to this plan of care.

## 2022-03-31 NOTE — ASSESSMENT & PLAN NOTE
Pt appears to be normotensive today. Home health will continue to regularly check his blood pressure. Pt to follow up with me as well as his cardiologist in approx. 3 months.

## 2022-05-03 ENCOUNTER — OFFICE VISIT (OUTPATIENT)
Dept: PAIN MEDICINE | Facility: CLINIC | Age: 87
End: 2022-05-03
Payer: MEDICARE

## 2022-05-03 VITALS
HEART RATE: 99 BPM | DIASTOLIC BLOOD PRESSURE: 67 MMHG | SYSTOLIC BLOOD PRESSURE: 104 MMHG | WEIGHT: 151 LBS | BODY MASS INDEX: 21.14 KG/M2 | HEIGHT: 71 IN

## 2022-05-03 DIAGNOSIS — M89.49 OSTEOARTHROSIS MULTIPLE SITES, NOT SPECIFIED AS GENERALIZED: Chronic | ICD-10-CM

## 2022-05-03 DIAGNOSIS — M25.572 CHRONIC PAIN OF LEFT ANKLE: Primary | Chronic | ICD-10-CM

## 2022-05-03 DIAGNOSIS — G89.29 CHRONIC PAIN OF LEFT ANKLE: Primary | Chronic | ICD-10-CM

## 2022-05-03 DIAGNOSIS — Z79.899 ENCOUNTER FOR LONG-TERM (CURRENT) USE OF OTHER MEDICATIONS: ICD-10-CM

## 2022-05-03 DIAGNOSIS — M47.817 LUMBOSACRAL SPONDYLOSIS WITHOUT MYELOPATHY: Chronic | ICD-10-CM

## 2022-05-03 LAB
CTP QC/QA: YES
POC (AMP) AMPHETAMINE: NEGATIVE
POC (BAR) BARBITURATES: NEGATIVE
POC (BUP) BUPRENORPHINE: NEGATIVE
POC (BZO) BENZODIAZEPINES: NEGATIVE
POC (COC) COCAINE: NEGATIVE
POC (MDMA) METHYLENEDIOXYMETHAMPHETAMINE 3,4: NEGATIVE
POC (MET) METHAMPHETAMINE: NEGATIVE
POC (MOP) OPIATES: ABNORMAL
POC (MTD) METHADONE: NEGATIVE
POC (OXY) OXYCODONE: NEGATIVE
POC (PCP) PHENCYCLIDINE: NEGATIVE
POC (TCA) TRICYCLIC ANTIDEPRESSANTS: NEGATIVE
POC TEMPERATURE (URINE): 92
POC THC: NEGATIVE

## 2022-05-03 PROCEDURE — 1101F PR PT FALLS ASSESS DOC 0-1 FALLS W/OUT INJ PAST YR: ICD-10-PCS | Mod: CPTII,,, | Performed by: PHYSICIAN ASSISTANT

## 2022-05-03 PROCEDURE — 99214 OFFICE O/P EST MOD 30 MIN: CPT | Mod: PBBFAC | Performed by: PHYSICIAN ASSISTANT

## 2022-05-03 PROCEDURE — 99214 PR OFFICE/OUTPT VISIT, EST, LEVL IV, 30-39 MIN: ICD-10-PCS | Mod: S$PBB,,, | Performed by: PHYSICIAN ASSISTANT

## 2022-05-03 PROCEDURE — 99214 OFFICE O/P EST MOD 30 MIN: CPT | Mod: S$PBB,,, | Performed by: PHYSICIAN ASSISTANT

## 2022-05-03 PROCEDURE — 80305 DRUG TEST PRSMV DIR OPT OBS: CPT | Mod: PBBFAC | Performed by: PHYSICIAN ASSISTANT

## 2022-05-03 PROCEDURE — 3288F PR FALLS RISK ASSESSMENT DOCUMENTED: ICD-10-PCS | Mod: CPTII,,, | Performed by: PHYSICIAN ASSISTANT

## 2022-05-03 PROCEDURE — 1159F MED LIST DOCD IN RCRD: CPT | Mod: CPTII,,, | Performed by: PHYSICIAN ASSISTANT

## 2022-05-03 PROCEDURE — 1125F PR PAIN SEVERITY QUANTIFIED, PAIN PRESENT: ICD-10-PCS | Mod: CPTII,,, | Performed by: PHYSICIAN ASSISTANT

## 2022-05-03 PROCEDURE — 1159F PR MEDICATION LIST DOCUMENTED IN MEDICAL RECORD: ICD-10-PCS | Mod: CPTII,,, | Performed by: PHYSICIAN ASSISTANT

## 2022-05-03 PROCEDURE — 1101F PT FALLS ASSESS-DOCD LE1/YR: CPT | Mod: CPTII,,, | Performed by: PHYSICIAN ASSISTANT

## 2022-05-03 PROCEDURE — 3288F FALL RISK ASSESSMENT DOCD: CPT | Mod: CPTII,,, | Performed by: PHYSICIAN ASSISTANT

## 2022-05-03 PROCEDURE — 1125F AMNT PAIN NOTED PAIN PRSNT: CPT | Mod: CPTII,,, | Performed by: PHYSICIAN ASSISTANT

## 2022-05-03 RX ORDER — HYDROCODONE BITARTRATE AND ACETAMINOPHEN 10; 325 MG/1; MG/1
1 TABLET ORAL EVERY 8 HOURS
Qty: 90 TABLET | Refills: 0 | Status: SHIPPED | OUTPATIENT
Start: 2022-05-11 | End: 2022-06-10

## 2022-05-03 RX ORDER — HYDROCODONE BITARTRATE AND ACETAMINOPHEN 10; 325 MG/1; MG/1
1 TABLET ORAL EVERY 8 HOURS
Qty: 90 TABLET | Refills: 0 | Status: SHIPPED | OUTPATIENT
Start: 2022-06-10 | End: 2022-07-10

## 2022-05-03 RX ORDER — HYDROCODONE BITARTRATE AND ACETAMINOPHEN 10; 325 MG/1; MG/1
1 TABLET ORAL EVERY 8 HOURS
Qty: 90 TABLET | Refills: 0 | Status: SHIPPED | OUTPATIENT
Start: 2022-07-10 | End: 2022-08-08 | Stop reason: SDUPTHER

## 2022-05-03 NOTE — PROGRESS NOTES
Disclaimer:  This note has been generated using voice recognition software.  There may be type of graft focal areas that have been missed during a proof reading      Subjective:      Patient ID: Coleman Lares is a 89 y.o. male.    Chief Complaint: Low-back Pain and Joint Pain      Pain  This is a chronic problem. The current episode started more than 1 year ago. The problem occurs daily. The problem has been unchanged. Associated symptoms include arthralgias and neck pain. Pertinent negatives include no change in bowel habit, chest pain, chills, coughing, diaphoresis, fever, rash, sore throat, vertigo or vomiting.     Review of Systems   Constitutional: Negative for activity change, chills, diaphoresis, fever and unexpected weight change.   HENT: Negative for drooling, ear discharge, ear pain, facial swelling, mouth sores, nosebleeds, sore throat, trouble swallowing, voice change and goiter.    Eyes: Negative for photophobia, pain, discharge, redness and visual disturbance.   Respiratory: Negative for apnea, cough, choking, chest tightness, shortness of breath, wheezing and stridor.    Cardiovascular: Negative for chest pain, palpitations and leg swelling.   Gastrointestinal: Negative for abdominal distention, change in bowel habit, diarrhea, rectal pain, vomiting, fecal incontinence and change in bowel habit.   Endocrine: Negative for cold intolerance, heat intolerance, polydipsia, polyphagia and polyuria.   Genitourinary: Negative for bladder incontinence, dysuria, flank pain and frequency.   Musculoskeletal: Positive for arthralgias, back pain, leg pain and neck pain.   Integumentary:  Negative for color change, pallor and rash.   Neurological: Negative for dizziness, vertigo, seizures, syncope, facial asymmetry, speech difficulty, light-headedness, disturbances in coordination, memory loss and coordination difficulties.   Hematological: Negative for adenopathy. Does not bruise/bleed easily.  "  Psychiatric/Behavioral: Negative for agitation, behavioral problems, confusion, decreased concentration, dysphoric mood, hallucinations, self-injury and suicidal ideas. The patient is not nervous/anxious and is not hyperactive.             Objective:  Vitals:    05/03/22 1110   BP: 104/67   Pulse: 99   Weight: 68.5 kg (151 lb)   Height: 5' 11" (1.803 m)   PainSc:   7         Physical Exam  Vitals and nursing note reviewed. Exam conducted with a chaperone present.   Constitutional:       General: He is awake.      Appearance: Normal appearance. He is not ill-appearing, toxic-appearing or diaphoretic.   HENT:      Head: Normocephalic and atraumatic.      Nose: Nose normal.      Mouth/Throat:      Mouth: Mucous membranes are moist.      Pharynx: Oropharynx is clear.   Eyes:      Conjunctiva/sclera: Conjunctivae normal.      Pupils: Pupils are equal, round, and reactive to light.   Cardiovascular:      Rate and Rhythm: Normal rate.   Pulmonary:      Effort: Pulmonary effort is normal. No respiratory distress.   Abdominal:      Palpations: Abdomen is soft.   Musculoskeletal:         General: Normal range of motion.      Cervical back: Normal range of motion and neck supple. Tenderness present.      Lumbar back: Tenderness present.   Skin:     General: Skin is warm and dry.   Neurological:      General: No focal deficit present.      Mental Status: He is alert and oriented to person, place, and time. Mental status is at baseline.      Cranial Nerves: Cranial nerves are intact. No cranial nerve deficit (II-XII).   Psychiatric:         Mood and Affect: Mood normal.         Behavior: Behavior normal. Behavior is cooperative.         Thought Content: Thought content normal.           Orders Placed This Encounter   Procedures    POCT Urine Drug Screen Presump     Interpretive Information:     Negative:  No drug detected at the cut off level.   Positive:  This result represents presumptive positive for the   tested drug, " other substances may yield a positive response other   than the analyte of interest. This result should be utilized for   diagnostic purpose only. Confirmation testing will be performed upon physician request only.           NM Lung Scan Ventilation Perfusion  Narrative: EXAMINATION:  NM LUNG VENTILATION AND PERFUSION IMAGING    CLINICAL HISTORY:  Pulmonary embolism (PE) suspected, positive D-dimer;    COMPARISON:  None.    FINDINGS:  Ventilation scan: The patient received 40.0 mCi of technetium 99m DTPA aerosolized.    There is normal distribution of radiotracer in both lungs.    Perfusion scan: Patient received 4.0 mCi of technetium 99m MAA intravenously.    There is normal in distribution of radiotracer in both lungs without evidence of segmental or greater defects.  Impression: Normal nuclear medicine ventilation perfusion scan. This indicates low probability for pulmonary embolism.    Electronically signed by: Simon Virgen  Date:    02/11/2022  Time:    13:39       No results displayed because visit has over 200 results.      Office Visit on 02/03/2022   Component Date Value Ref Range Status    POC Amphetamines 02/03/2022 Negative  Negative, Inconclusive Final    POC Barbiturates 02/03/2022 Negative  Negative, Inconclusive Final    POC Benzodiazepines 02/03/2022 Negative  Negative, Inconclusive Final    POC Cocaine 02/03/2022 Negative  Negative, Inconclusive Final    POC THC 02/03/2022 Negative  Negative, Inconclusive Final    POC Methadone 02/03/2022 Negative  Negative, Inconclusive Final    POC Methamphetamine 02/03/2022 Negative  Negative, Inconclusive Final    POC Opiates 02/03/2022 Presumptive Positive (A) Negative, Inconclusive Final    POC Oxycodone 02/03/2022 Negative  Negative, Inconclusive Final    POC Phencyclidine 02/03/2022 Negative  Negative, Inconclusive Final    POC Methylenedioxymethamphetamine * 02/03/2022 Negative  Negative, Inconclusive Final    POC Tricyclic Antidepressants  02/03/2022 Negative  Negative, Inconclusive Final    POC Buprenorphine 02/03/2022 Negative   Final     Acceptable 02/03/2022 Yes   Final    POC Temperature (Urine) 02/03/2022 94   Final   Office Visit on 11/04/2021   Component Date Value Ref Range Status    POC Amphetamines 11/04/2021 Negative  Negative, Inconclusive Final    POC Barbiturates 11/04/2021 Negative  Negative, Inconclusive Final    POC Benzodiazepines 11/04/2021 Negative  Negative, Inconclusive Final    POC Cocaine 11/04/2021 Negative  Negative, Inconclusive Final    POC THC 11/04/2021 Negative  Negative, Inconclusive Final    POC Methadone 11/04/2021 Negative  Negative, Inconclusive Final    POC Methamphetamine 11/04/2021 Negative  Negative, Inconclusive Final    POC Opiates 11/04/2021 Presumptive Positive (A) Negative, Inconclusive Final    POC Oxycodone 11/04/2021 Negative  Negative, Inconclusive Final    POC Phencyclidine 11/04/2021 Negative  Negative, Inconclusive Final    POC Methylenedioxymethamphetamine * 11/04/2021 Negative  Negative, Inconclusive Final    POC Tricyclic Antidepressants 11/04/2021 Negative  Negative, Inconclusive Final    POC Buprenorphine 11/04/2021 Negative   Final     Acceptable 11/04/2021 Yes   Final    POC Temperature (Urine) 11/04/2021 90   Final         Assessment:      1. Chronic pain of left ankle history ORIF    2. Lumbosacral spondylosis without myelopathy    3. Osteoarthrosis multiple sites, not specified as generalized    4. Encounter for long-term (current) use of other medications            A's of Opioid Risk Assessment  Activity:Patient can perform ADL.   Analgesia:Patients pain is partially controlled by current medication. Patient has tried OTC medications such as Tylenol and Ibuprofen with out relief.   Adverse Effects: Patient denies constipation or sedation.  Aberrant Behavior:  reviewed with no aberrant drug seeking/taking behavior.  Overdose reversal drug  naloxone discussed    Drug screen reviewed      Requested Prescriptions     Signed Prescriptions Disp Refills    HYDROcodone-acetaminophen (NORCO)  mg per tablet 90 tablet 0     Sig: Take 1 tablet by mouth every 8 (eight) hours.    HYDROcodone-acetaminophen (NORCO)  mg per tablet 90 tablet 0     Sig: Take 1 tablet by mouth every 8 (eight) hours.    HYDROcodone-acetaminophen (NORCO)  mg per tablet 90 tablet 0     Sig: Take 1 tablet by mouth every 8 (eight) hours.         Plan:    History left ankle ORIF    He states current medications helping control his discomfort    He would like to continue with conservative management    Continue home exercise program as directed    Continue current medication    Follow-up 3 months    Dr. Beard, August 2022    Bring original prescription medication bottles/container/box with labels to each visit

## 2022-05-27 DIAGNOSIS — I10 HYPERTENSION, UNSPECIFIED TYPE: ICD-10-CM

## 2022-05-27 RX ORDER — LOSARTAN POTASSIUM 50 MG/1
50 TABLET ORAL DAILY
Qty: 30 TABLET | Refills: 0 | Status: CANCELLED | OUTPATIENT
Start: 2022-05-27

## 2022-06-20 ENCOUNTER — OFFICE VISIT (OUTPATIENT)
Dept: FAMILY MEDICINE | Facility: CLINIC | Age: 87
End: 2022-06-20
Payer: MEDICARE

## 2022-06-20 VITALS
OXYGEN SATURATION: 91 % | WEIGHT: 147 LBS | BODY MASS INDEX: 20.58 KG/M2 | DIASTOLIC BLOOD PRESSURE: 73 MMHG | HEIGHT: 71 IN | TEMPERATURE: 98 F | SYSTOLIC BLOOD PRESSURE: 121 MMHG | RESPIRATION RATE: 18 BRPM | HEART RATE: 79 BPM

## 2022-06-20 DIAGNOSIS — K59.00 CONSTIPATION, UNSPECIFIED CONSTIPATION TYPE: Primary | ICD-10-CM

## 2022-06-20 PROCEDURE — 99213 PR OFFICE/OUTPT VISIT, EST, LEVL III, 20-29 MIN: ICD-10-PCS | Mod: GC,,, | Performed by: SPECIALIST

## 2022-06-20 PROCEDURE — 99213 OFFICE O/P EST LOW 20 MIN: CPT | Mod: GC,,, | Performed by: SPECIALIST

## 2022-06-20 RX ORDER — DOCUSATE SODIUM 250 MG
250 CAPSULE ORAL DAILY
Qty: 30 CAPSULE | Refills: 3 | Status: SHIPPED | OUTPATIENT
Start: 2022-06-20 | End: 2023-05-04 | Stop reason: SDUPTHER

## 2022-06-20 RX ORDER — POLYETHYLENE GLYCOL 3350 17 G/17G
17 POWDER, FOR SOLUTION ORAL DAILY
Qty: 30 EACH | Refills: 3 | Status: SHIPPED | OUTPATIENT
Start: 2022-06-20 | End: 2023-05-04 | Stop reason: SDUPTHER

## 2022-06-20 NOTE — PROGRESS NOTES
History:     Patient ID: Coleman Lares is a 89 y.o. male.    Chief Complaint: Constipation (C/o severe constipation x 6 months. OTC laxatives are not working. )    Coleman Lares is an 89-year-old male who reports to clinic with complaints of constipation. Patient reports these symptoms started about six months ago, however per chart review he was prescribed Linzess in 2021. He describes infrequent, hard stool which is painful to pass. States he has regular urges to defecate but has difficulty passing stool when he attempts. He is usually able to feel hard stool in the rectum and has self-disimpacted on several occasions. He also describes some small amounts of bright red blood, especially associated with disimpaction. He denies any significant abdominal pain, nausea, vomiting, appetite changes, weight loss, or fatigue.    He is on daily opiate therapy for chronic back pain. Per family in the room he also is largely sedentary with a diet low in fiber (Ensure, cookies, etc.) and inadequate water intake. This has worsened since he was hospitalized with Covid approximately six months ago, with an associated worsening of constipation. Patient lives alone at home and ambulates with a walker; home health has attempted physical therapy in the past but he was not cooperative. Over the counter treatments have included fiber supplements, Miralax, stool softener, milk of magnesia, and stimulant laxatives with inconsistent results. Previously prescribed low-dose Linzess was minimally effective.     Recommended combination therapy with daily stool softener, Colage, and Miralax taken together once a day. Discussed adding additional doses of Miralax (2-3 times daily) until patient experiences a bowel movement, then once daily for maintenance. Also advised caution with self-disimpaction, discussed risks including trauma and infection. Recommended PRN Fleet enema instead. Recommended discontinuation of daily stimulant laxative,  with PRN use only. Consider GI referral if symptoms continue or worsen, however will proceed with conservative measures at this time due to patient age and underlying chronic health conditions. Patient and family in agreement.         Current Outpatient Medications:     acetaminophen (TYLENOL) 325 MG tablet, Take 325 mg by mouth every 6 (six) hours as needed for Pain., Disp: , Rfl:     aspirin (ECOTRIN) 81 MG EC tablet, Take 1 tablet (81 mg total) by mouth once daily., Disp: 30 tablet, Rfl: 5    carvediloL (COREG) 6.25 MG tablet, Take 1 tablet by mouth once daily, Disp: 30 tablet, Rfl: 2    docusate sodium (COLACE) 250 MG capsule, Take 1 capsule (250 mg total) by mouth once daily., Disp: 30 capsule, Rfl: 3    ergocalciferol (ERGOCALCIFEROL) 50,000 unit Cap, Take 50,000 Units by mouth every 7 days., Disp: , Rfl:     HYDROcodone-acetaminophen (NORCO)  mg per tablet, Take 1 tablet by mouth every 8 (eight) hours., Disp: 90 tablet, Rfl: 0    [START ON 7/10/2022] HYDROcodone-acetaminophen (NORCO)  mg per tablet, Take 1 tablet by mouth every 8 (eight) hours., Disp: 90 tablet, Rfl: 0    linaCLOtide (LINZESS) 290 mcg Cap capsule, Take 1 capsule (290 mcg total) by mouth before breakfast., Disp: 30 capsule, Rfl: 3    losartan (COZAAR) 50 MG tablet, Take 1 tablet by mouth once daily, Disp: 30 tablet, Rfl: 0    NIFEdipine (ADALAT CC) 30 MG TbSR, Take 1 tablet (30 mg total) by mouth once daily., Disp: 90 tablet, Rfl: 0    polyethylene glycol (GLYCOLAX) 17 gram PwPk, Take 17 g by mouth once daily., Disp: 30 each, Rfl: 3    simvastatin (ZOCOR) 80 MG tablet, Take 1 tablet (80 mg total) by mouth every evening. Take 1/2 tablet once at night., Disp: 30 tablet, Rfl: 2    tamsulosin (FLOMAX) 0.4 mg Cap, Take 1 capsule (0.4 mg total) by mouth once daily., Disp: 90 capsule, Rfl: 0    traZODone (DESYREL) 50 MG tablet, Take 1 tablet (50 mg total) by mouth every evening., Disp: 30 tablet, Rfl: 1    Review of  patient's allergies indicates:  No Known Allergies    Past Medical History:   Diagnosis Date    Aortic regurgitation     Back pain     CAD (coronary artery disease)     Chronic back pain     Constipation     COPD (chronic obstructive pulmonary disease)     Generalized OA     Hyperlipidemia     Hypertension     Osteopenia determined by x-ray     Pulmonary stenosis        Past Surgical History:   Procedure Laterality Date    CORONARY ANGIOPLASTY WITH STENT PLACEMENT      FOOT SURGERY  2021    Dr Jackson    HIP SURGERY      LITHOTRIPSY         Family History   Problem Relation Age of Onset    Cancer Mother     Cancer Sister     Cancer Brother        Social History     Tobacco Use    Smoking status: Former Smoker    Smokeless tobacco: Never Used   Substance Use Topics    Alcohol use: Never    Drug use: Never       Subjective:     Review of Systems   Constitutional: Negative for appetite change, fatigue and unexpected weight change.   HENT: Positive for hearing loss (chronic). Negative for nasal congestion, ear pain, sore throat and trouble swallowing.    Eyes: Negative for pain and visual disturbance.   Respiratory: Negative for cough and shortness of breath.    Cardiovascular: Negative for chest pain, palpitations, leg swelling and claudication.   Gastrointestinal: Positive for abdominal pain (mild, associated with constipation), blood in stool and constipation (chronic, worsening). Negative for change in bowel habit, diarrhea, nausea, rectal pain, reflux, fecal incontinence and change in bowel habit.   Endocrine: Negative for polydipsia and polyuria.   Genitourinary: Negative for dysuria and flank pain.   Musculoskeletal: Positive for arthralgias. Negative for myalgias.   Neurological: Negative for vertigo, weakness and headaches.   Psychiatric/Behavioral: Negative for confusion, sleep disturbance and suicidal ideas.           Objective:        Vitals:    06/20/22 1515   BP: 121/73   BP Location:  "Left arm   Patient Position: Sitting   BP Method: Medium (Automatic)   Pulse: 79   Resp: 18   Temp: 98.1 °F (36.7 °C)   TempSrc: Oral   SpO2: (!) 91%   Weight: 66.7 kg (147 lb)   Height: 5' 11" (1.803 m)       Physical Exam  Constitutional:       General: He is not in acute distress.  HENT:      Head: Normocephalic and atraumatic.      Mouth/Throat:      Mouth: Mucous membranes are moist.      Pharynx: No oropharyngeal exudate or posterior oropharyngeal erythema.   Eyes:      General: No scleral icterus.     Extraocular Movements: Extraocular movements intact.      Conjunctiva/sclera: Conjunctivae normal.      Pupils: Pupils are equal, round, and reactive to light.   Neck:      Vascular: No carotid bruit.   Cardiovascular:      Rate and Rhythm: Normal rate and regular rhythm.      Pulses: Normal pulses.      Heart sounds: No murmur heard.  Pulmonary:      Effort: Pulmonary effort is normal.      Breath sounds: Normal breath sounds.   Chest:      Chest wall: No tenderness.   Abdominal:      General: Abdomen is flat. Bowel sounds are normal. There is no distension.      Palpations: Abdomen is soft. There is no mass.      Tenderness: There is no abdominal tenderness. There is no guarding or rebound.   Musculoskeletal:         General: No deformity. Normal range of motion.      Cervical back: Normal range of motion and neck supple.      Right lower leg: No edema.      Left lower leg: No edema.   Skin:     General: Skin is warm and dry.      Capillary Refill: Capillary refill takes less than 2 seconds.      Coloration: Skin is not jaundiced.      Findings: No lesion or rash.   Neurological:      General: No focal deficit present.      Mental Status: He is alert and oriented to person, place, and time. Mental status is at baseline.      Cranial Nerves: No cranial nerve deficit.      Sensory: No sensory deficit.   Psychiatric:         Mood and Affect: Mood normal.         Behavior: Behavior normal.         Thought Content: " Thought content normal.         Judgment: Judgment normal.             Assessment         1. Constipation, unspecified constipation type        Plan:         Problem List Items Addressed This Visit        GI    Constipation - Primary     Daily fiber supplement, osmotic laxative stool softener. Fleet enema PRN. Discontinue daily stimulant laxative, use only PRN. Encourage exercise and adequate hydration.                Relevant Medications    linaCLOtide (LINZESS) 290 mcg Cap capsule    docusate sodium (COLACE) 250 MG capsule    polyethylene glycol (GLYCOLAX) 17 gram PwPk          Follow up in about 1 week (around 6/27/2022), or if symptoms worsen or fail to improve.    Page Plunkett, DO

## 2022-06-20 NOTE — PATIENT INSTRUCTIONS
Take daily (take all three medications together, the time of day is not important):    - fiber supplement  - Colace (docusate)  - Miralax (polyethylene glycol) 1-2 times daily    For the next 2-3 days, recommend a tablespoon of mineral oil by mouth daily.    Add Linzesse daily before breakfast.     Encourage hydration, fiber intake (see attached document) and exercise.     If symptoms do not improve with treatment, return to clinic for further evaluation and possible GI referral.     Call the clinic or go to ER for severe abdominal pain, nausea and vomiting that will not stop, heavy bleeding, or total inability to pass stool or gas.

## 2022-06-20 NOTE — ASSESSMENT & PLAN NOTE
Daily fiber supplement, osmotic laxative stool softener. Fleet enema PRN. Discontinue daily stimulant laxative, use only PRN. Encourage exercise and adequate hydration.     Consider GI referral if symptoms persist and patient agreeable, however will continue conservative treatment for now considering age and underlying health conditions.

## 2022-07-07 ENCOUNTER — OFFICE VISIT (OUTPATIENT)
Dept: FAMILY MEDICINE | Facility: CLINIC | Age: 87
End: 2022-07-07
Payer: MEDICARE

## 2022-07-07 VITALS
BODY MASS INDEX: 19.75 KG/M2 | HEART RATE: 91 BPM | SYSTOLIC BLOOD PRESSURE: 114 MMHG | TEMPERATURE: 99 F | DIASTOLIC BLOOD PRESSURE: 71 MMHG | OXYGEN SATURATION: 90 % | WEIGHT: 141.63 LBS

## 2022-07-07 DIAGNOSIS — K59.03 THERAPEUTIC OPIOID-INDUCED CONSTIPATION (OIC): Primary | ICD-10-CM

## 2022-07-07 DIAGNOSIS — T40.2X5A THERAPEUTIC OPIOID-INDUCED CONSTIPATION (OIC): Primary | ICD-10-CM

## 2022-07-07 PROCEDURE — 99213 PR OFFICE/OUTPT VISIT, EST, LEVL III, 20-29 MIN: ICD-10-PCS | Mod: ,,, | Performed by: FAMILY MEDICINE

## 2022-07-07 PROCEDURE — 99213 OFFICE O/P EST LOW 20 MIN: CPT | Mod: ,,, | Performed by: FAMILY MEDICINE

## 2022-07-07 RX ORDER — LUBIPROSTONE 24 UG/1
24 CAPSULE ORAL 2 TIMES DAILY WITH MEALS
Qty: 180 CAPSULE | Refills: 1 | Status: SHIPPED | OUTPATIENT
Start: 2022-07-07 | End: 2022-07-08

## 2022-07-07 RX ORDER — NALOXEGOL OXALATE 12.5 MG/1
1 TABLET, FILM COATED ORAL EVERY MORNING
Qty: 90 TABLET | Refills: 2 | Status: SHIPPED | OUTPATIENT
Start: 2022-07-07 | End: 2022-10-05

## 2022-07-07 RX ORDER — METHYLNALTREXONE BROMIDE 150 MG/1
1 TABLET ORAL EVERY MORNING
Qty: 90 TABLET | Refills: 0 | Status: CANCELLED | OUTPATIENT
Start: 2022-07-07

## 2022-07-07 NOTE — ASSESSMENT & PLAN NOTE
Pt reports that he has been compliant with the medications he was prescribed during his last visit. However, he continues to experience constipation. Pt instructed to double his Miralax dose.  I will prescribe Movantik and instruct Pt to RTC in 1 week.

## 2022-07-07 NOTE — PATIENT INSTRUCTIONS
Please continue using Linzess, Miralax and Docusate for Constipation. I will attempt to prescribe Movantik. Pt instructed to double his dose of Miralax.

## 2022-07-07 NOTE — PROGRESS NOTES
"  Subjective:       Patient ID: Coleman Lares is a 89 y.o. male.    Chief Complaint: Follow-up and Constipation    Pt is an 89 y.o.  male presenting  with a few weeks of constipation. He is a  former smoker, quit in 2000, with hx of COPD, HTN and HLD.  He is not accompanied by any family as in previous visits. Pt was last seen by Dr. Low a few weeks ago and given several medications including Linzess, stool softeners and laxatives for his constipation. He has also been drinking Ensure and prune juice. Pt states that neverthless has to "dig out" hist stool on a daily basis with a spoon. Pt is a long term opiate patient.      He reports that he no longer has home health. However, he states that his son lives next door to him and that his girlfriend regularly checks up on him. Although physical therapy has not followed up with him, he reports that he is able to exercise on his own and does not need them at this time.           Current Outpatient Medications:     acetaminophen (TYLENOL) 325 MG tablet, Take 325 mg by mouth every 6 (six) hours as needed for Pain., Disp: , Rfl:     aspirin (ECOTRIN) 81 MG EC tablet, Take 1 tablet (81 mg total) by mouth once daily., Disp: 30 tablet, Rfl: 5    carvediloL (COREG) 6.25 MG tablet, Take 1 tablet by mouth once daily, Disp: 30 tablet, Rfl: 2    docusate sodium (COLACE) 250 MG capsule, Take 1 capsule (250 mg total) by mouth once daily., Disp: 30 capsule, Rfl: 3    ergocalciferol (ERGOCALCIFEROL) 50,000 unit Cap, Take 50,000 Units by mouth every 7 days., Disp: , Rfl:     HYDROcodone-acetaminophen (NORCO)  mg per tablet, Take 1 tablet by mouth every 8 (eight) hours., Disp: 90 tablet, Rfl: 0    [START ON 7/10/2022] HYDROcodone-acetaminophen (NORCO)  mg per tablet, Take 1 tablet by mouth every 8 (eight) hours., Disp: 90 tablet, Rfl: 0    linaCLOtide (LINZESS) 290 mcg Cap capsule, Take 1 capsule (290 mcg total) by mouth before breakfast., Disp: 30 " capsule, Rfl: 3    losartan (COZAAR) 50 MG tablet, Take 1 tablet by mouth once daily, Disp: 30 tablet, Rfl: 0    naloxegoL (MOVANTIK) 12.5 mg Tab, Take 1 tablet by mouth every morning., Disp: 90 tablet, Rfl: 2    NIFEdipine (PROCARDIA-XL) 30 MG (OSM) 24 hr tablet, Take 1 tablet by mouth once daily, Disp: 90 tablet, Rfl: 0    polyethylene glycol (GLYCOLAX) 17 gram PwPk, Take 17 g by mouth once daily., Disp: 30 each, Rfl: 3    simvastatin (ZOCOR) 80 MG tablet, Take 1 tablet (80 mg total) by mouth every evening. Take 1/2 tablet once at night., Disp: 30 tablet, Rfl: 2    tamsulosin (FLOMAX) 0.4 mg Cap, Take 1 capsule (0.4 mg total) by mouth once daily., Disp: 90 capsule, Rfl: 0    traZODone (DESYREL) 50 MG tablet, Take 1 tablet (50 mg total) by mouth every evening., Disp: 30 tablet, Rfl: 1    Review of patient's allergies indicates:  No Known Allergies    Past Medical History:   Diagnosis Date    Aortic regurgitation     Back pain     CAD (coronary artery disease)     Chronic back pain     Constipation     COPD (chronic obstructive pulmonary disease)     Generalized OA     Hyperlipidemia     Hypertension     Osteopenia determined by x-ray     Pulmonary stenosis        Past Surgical History:   Procedure Laterality Date    CORONARY ANGIOPLASTY WITH STENT PLACEMENT      FOOT SURGERY  2021    Dr Jackson    HIP SURGERY      LITHOTRIPSY         Family History   Problem Relation Age of Onset    Cancer Mother     Cancer Sister     Cancer Brother        Social History     Tobacco Use    Smoking status: Former Smoker    Smokeless tobacco: Never Used   Substance Use Topics    Alcohol use: Never    Drug use: Never       Review of Systems   Constitutional: Negative for chills, diaphoresis and fever.   HENT: Negative for sore throat.    Eyes: Negative for visual disturbance.   Respiratory: Negative for cough and shortness of breath.    Cardiovascular: Negative for chest pain and palpitations.    Gastrointestinal: Positive for constipation. Negative for abdominal pain, diarrhea, nausea and vomiting.   Genitourinary: Negative for dysuria and hematuria.   Musculoskeletal: Negative for arthralgias and leg pain.   Integumentary:  Negative for rash.   Neurological: Negative for dizziness, light-headedness, numbness and headaches.         Current Medications:   Medication List with Changes/Refills   New Medications    NALOXEGOL (MOVANTIK) 12.5 MG TAB    Take 1 tablet by mouth every morning.       Start Date: 7/7/2022  End Date: 10/5/2022   Current Medications    ACETAMINOPHEN (TYLENOL) 325 MG TABLET    Take 325 mg by mouth every 6 (six) hours as needed for Pain.       Start Date: --        End Date: --    ASPIRIN (ECOTRIN) 81 MG EC TABLET    Take 1 tablet (81 mg total) by mouth once daily.       Start Date: 8/3/2021  End Date: --    CARVEDILOL (COREG) 6.25 MG TABLET    Take 1 tablet by mouth once daily       Start Date: 5/31/2022 End Date: 8/29/2022    DOCUSATE SODIUM (COLACE) 250 MG CAPSULE    Take 1 capsule (250 mg total) by mouth once daily.       Start Date: 6/20/2022 End Date: --    ERGOCALCIFEROL (ERGOCALCIFEROL) 50,000 UNIT CAP    Take 50,000 Units by mouth every 7 days.       Start Date: --        End Date: --    HYDROCODONE-ACETAMINOPHEN (NORCO)  MG PER TABLET    Take 1 tablet by mouth every 8 (eight) hours.       Start Date: 6/10/2022 End Date: 7/10/2022    HYDROCODONE-ACETAMINOPHEN (NORCO)  MG PER TABLET    Take 1 tablet by mouth every 8 (eight) hours.       Start Date: 7/10/2022 End Date: 8/9/2022    LINACLOTIDE (LINZESS) 290 MCG CAP CAPSULE    Take 1 capsule (290 mcg total) by mouth before breakfast.       Start Date: 6/20/2022 End Date: --    LOSARTAN (COZAAR) 50 MG TABLET    Take 1 tablet by mouth once daily       Start Date: 6/6/2022  End Date: --    NIFEDIPINE (PROCARDIA-XL) 30 MG (OSM) 24 HR TABLET    Take 1 tablet by mouth once daily       Start Date: 7/7/2022  End Date: --     POLYETHYLENE GLYCOL (GLYCOLAX) 17 GRAM PWPK    Take 17 g by mouth once daily.       Start Date: 6/20/2022 End Date: --    SIMVASTATIN (ZOCOR) 80 MG TABLET    Take 1 tablet (80 mg total) by mouth every evening. Take 1/2 tablet once at night.       Start Date: 2/22/2022 End Date: --    TAMSULOSIN (FLOMAX) 0.4 MG CAP    Take 1 capsule (0.4 mg total) by mouth once daily.       Start Date: 3/18/2022 End Date: 6/16/2022    TRAZODONE (DESYREL) 50 MG TABLET    Take 1 tablet (50 mg total) by mouth every evening.       Start Date: 2/28/2022 End Date: --            Objective:        Vitals:    07/07/22 1604   BP: 114/71   Pulse: 91   Temp: 98.5 °F (36.9 °C)   TempSrc: Temporal   SpO2: (!) 90%   Weight: 64.2 kg (141 lb 9.6 oz)       Physical Exam  Constitutional:       General: He is not in acute distress.     Appearance: He is not ill-appearing, toxic-appearing or diaphoretic.      Comments: Pt appears elderly and frail    HENT:      Head: Normocephalic and atraumatic.      Right Ear: External ear normal. Decreased hearing noted.      Left Ear: External ear normal. Decreased hearing noted.      Nose: Nose normal.   Eyes:      General: No scleral icterus.        Right eye: No discharge.         Left eye: No discharge.      Extraocular Movements: Extraocular movements intact.   Cardiovascular:      Rate and Rhythm: Normal rate and regular rhythm.      Pulses: Normal pulses.      Heart sounds: Normal heart sounds. No murmur heard.    No friction rub. No gallop.   Pulmonary:      Effort: Pulmonary effort is normal. No respiratory distress.      Breath sounds: No stridor. No wheezing, rhonchi or rales.   Chest:      Chest wall: No tenderness.   Abdominal:      General: Bowel sounds are increased.      Palpations: Abdomen is soft.      Tenderness: There is no abdominal tenderness. There is no guarding.   Musculoskeletal:         General: No swelling.      Right lower leg: No edema.      Left lower leg: No edema.   Skin:     General:  Skin is warm and dry.   Neurological:      Mental Status: He is alert and oriented to person, place, and time.      Comments: Pt with slow gait. Pt seen ambulating with a rolling worker.                Lab Results   Component Value Date    WBC 22.10 (H) 02/16/2022    HGB 14.4 02/16/2022    HCT 43.9 02/16/2022     02/16/2022    CHOL 91 02/11/2022    TRIG 122 02/11/2022    HDL 13 (L) 02/11/2022    ALT 7 (L) 02/10/2022    AST 9 (L) 02/10/2022     02/16/2022    K 4.8 02/16/2022     02/16/2022    CREATININE 1.33 (H) 02/16/2022    BUN 53 (H) 02/16/2022    CO2 26 02/16/2022    TSH 0.306 (L) 02/10/2022    INR 1.15 03/23/2020      Assessment:       1. Therapeutic opioid-induced constipation (OIC)        Plan:         Problem List Items Addressed This Visit        GI    Therapeutic opioid-induced constipation (OIC) - Primary     Pt reports that he has been compliant with the medications he was prescribed during his last visit. However, he            Relevant Medications    naloxegoL (MOVANTIK) 12.5 mg Tab            Follow up in about 1 week (around 7/14/2022), or if symptoms worsen or fail to improve.    Juan Collins DO     Instructed patient that if symptoms fail to improve or worsen patient should seek immediate medical attention or report to the nearest emergency department. Patient expressed verbal agreement and understanding to this plan of care.

## 2022-07-21 ENCOUNTER — OFFICE VISIT (OUTPATIENT)
Dept: FAMILY MEDICINE | Facility: CLINIC | Age: 87
End: 2022-07-21
Payer: MEDICARE

## 2022-07-21 VITALS
BODY MASS INDEX: 19.74 KG/M2 | RESPIRATION RATE: 18 BRPM | DIASTOLIC BLOOD PRESSURE: 63 MMHG | HEIGHT: 71 IN | OXYGEN SATURATION: 93 % | HEART RATE: 66 BPM | WEIGHT: 141 LBS | TEMPERATURE: 98 F | SYSTOLIC BLOOD PRESSURE: 116 MMHG

## 2022-07-21 DIAGNOSIS — I10 HYPERTENSION, UNSPECIFIED TYPE: ICD-10-CM

## 2022-07-21 DIAGNOSIS — R73.09 ELEVATED RANDOM BLOOD GLUCOSE LEVEL: ICD-10-CM

## 2022-07-21 DIAGNOSIS — K59.03 DRUG-INDUCED CONSTIPATION: ICD-10-CM

## 2022-07-21 DIAGNOSIS — E78.5 HYPERLIPIDEMIA, UNSPECIFIED HYPERLIPIDEMIA TYPE: ICD-10-CM

## 2022-07-21 DIAGNOSIS — Z91.89 AT RISK FOR DIABETES MELLITUS: ICD-10-CM

## 2022-07-21 DIAGNOSIS — G47.00 INSOMNIA, UNSPECIFIED TYPE: Primary | ICD-10-CM

## 2022-07-21 PROCEDURE — 3288F PR FALLS RISK ASSESSMENT DOCUMENTED: ICD-10-PCS | Mod: ,,, | Performed by: FAMILY MEDICINE

## 2022-07-21 PROCEDURE — 1101F PT FALLS ASSESS-DOCD LE1/YR: CPT | Mod: ,,, | Performed by: FAMILY MEDICINE

## 2022-07-21 PROCEDURE — 3288F FALL RISK ASSESSMENT DOCD: CPT | Mod: ,,, | Performed by: FAMILY MEDICINE

## 2022-07-21 PROCEDURE — 99214 PR OFFICE/OUTPT VISIT, EST, LEVL IV, 30-39 MIN: ICD-10-PCS | Mod: ,,, | Performed by: FAMILY MEDICINE

## 2022-07-21 PROCEDURE — 99214 OFFICE O/P EST MOD 30 MIN: CPT | Mod: ,,, | Performed by: FAMILY MEDICINE

## 2022-07-21 PROCEDURE — 1101F PR PT FALLS ASSESS DOC 0-1 FALLS W/OUT INJ PAST YR: ICD-10-PCS | Mod: ,,, | Performed by: FAMILY MEDICINE

## 2022-07-21 RX ORDER — SIMVASTATIN 80 MG/1
80 TABLET, FILM COATED ORAL NIGHTLY
Qty: 30 TABLET | Refills: 2 | Status: SHIPPED | OUTPATIENT
Start: 2022-07-21 | End: 2023-01-12 | Stop reason: SDUPTHER

## 2022-07-21 RX ORDER — ALBUTEROL SULFATE 90 UG/1
2 AEROSOL, METERED RESPIRATORY (INHALATION)
COMMUNITY
End: 2023-10-17 | Stop reason: SDUPTHER

## 2022-07-21 RX ORDER — CARVEDILOL 6.25 MG/1
6.25 TABLET ORAL DAILY
Qty: 90 TABLET | Refills: 2 | Status: SHIPPED | OUTPATIENT
Start: 2022-07-21 | End: 2023-05-04

## 2022-07-21 RX ORDER — NIFEDIPINE 30 MG/1
30 TABLET, EXTENDED RELEASE ORAL DAILY
Qty: 90 TABLET | Refills: 2 | Status: SHIPPED | OUTPATIENT
Start: 2022-07-21

## 2022-07-21 RX ORDER — TRAZODONE HYDROCHLORIDE 50 MG/1
50 TABLET ORAL NIGHTLY
Qty: 30 TABLET | Refills: 1 | Status: SHIPPED | OUTPATIENT
Start: 2022-07-21

## 2022-07-21 RX ORDER — FERROUS SULFATE 325(65) MG
TABLET ORAL
COMMUNITY

## 2022-07-21 RX ORDER — LOSARTAN POTASSIUM 50 MG/1
50 TABLET ORAL DAILY
Qty: 90 TABLET | Refills: 2 | Status: SHIPPED | OUTPATIENT
Start: 2022-07-21 | End: 2023-06-30

## 2022-07-21 RX ORDER — IPRATROPIUM BROMIDE AND ALBUTEROL 20; 100 UG/1; UG/1
1 SPRAY, METERED RESPIRATORY (INHALATION)
COMMUNITY

## 2022-07-21 RX ORDER — CHOLECALCIFEROL (VITAMIN D3) 25 MCG
TABLET,CHEWABLE ORAL
COMMUNITY

## 2022-07-22 NOTE — PROGRESS NOTES
Subjective:       Patient ID: Coleman Lares is a 89 y.o. male.    Chief Complaint: Follow-up, Constipation, and Medication Refill    Patient presents to clinic as a follow up for constipation. Says that it has improved since he started taking daily colace. The patient says that he needed refill on some of his medications, and that he has been compliant with them. His blood pressure was 114/71 at the visit, and he says he checks his BP at home and that it is well controlled. The patient was due for routine screenings labs, so a CBC, CMP, and A1C was ordered. He did not report any acute complaints during this visit. Denied chest pain, SOB, palpitations, fevers, chills and unintended weight loss. Had his last colonoscopy at age 85 which was normal, and was told he would not need any more.         Current Outpatient Medications:     acetaminophen (TYLENOL) 325 MG tablet, Take 325 mg by mouth every 6 (six) hours as needed for Pain., Disp: , Rfl:     albuterol (PROVENTIL/VENTOLIN HFA) 90 mcg/actuation inhaler, Inhale 2 puffs into the lungs., Disp: , Rfl:     aspirin (ECOTRIN) 81 MG EC tablet, Take 1 tablet (81 mg total) by mouth once daily., Disp: 30 tablet, Rfl: 5    cyanocobalamin, vitamin B-12, 1,000 mcg Cap, , Disp: , Rfl:     docusate sodium (COLACE) 250 MG capsule, Take 1 capsule (250 mg total) by mouth once daily., Disp: 30 capsule, Rfl: 3    ergocalciferol (ERGOCALCIFEROL) 50,000 unit Cap, Take 50,000 Units by mouth every 7 days., Disp: , Rfl:     ferrous sulfate (FEOSOL) 325 mg (65 mg iron) Tab tablet, Take by mouth., Disp: , Rfl:     HYDROcodone-acetaminophen (NORCO)  mg per tablet, Take 1 tablet by mouth every 8 (eight) hours., Disp: 90 tablet, Rfl: 0    ipratropium-albuteroL (COMBIVENT RESPIMAT)  mcg/actuation inhaler, Inhale 1 puff into the lungs., Disp: , Rfl:     linaCLOtide (LINZESS) 290 mcg Cap capsule, Take 1 capsule (290 mcg total) by mouth before breakfast., Disp: 30 capsule,  Rfl: 3    naloxegoL (MOVANTIK) 12.5 mg Tab, Take 1 tablet by mouth every morning., Disp: 90 tablet, Rfl: 2    polyethylene glycol (GLYCOLAX) 17 gram PwPk, Take 17 g by mouth once daily., Disp: 30 each, Rfl: 3    carvediloL (COREG) 6.25 MG tablet, Take 1 tablet (6.25 mg total) by mouth once daily., Disp: 90 tablet, Rfl: 2    losartan (COZAAR) 50 MG tablet, Take 1 tablet (50 mg total) by mouth once daily., Disp: 90 tablet, Rfl: 2    NIFEdipine (PROCARDIA-XL) 30 MG (OSM) 24 hr tablet, Take 1 tablet (30 mg total) by mouth once daily., Disp: 90 tablet, Rfl: 2    simvastatin (ZOCOR) 80 MG tablet, Take 1 tablet (80 mg total) by mouth every evening. Take 1/2 tablet once at night., Disp: 30 tablet, Rfl: 2    tamsulosin (FLOMAX) 0.4 mg Cap, Take 1 capsule (0.4 mg total) by mouth once daily., Disp: 90 capsule, Rfl: 0    traZODone (DESYREL) 50 MG tablet, Take 1 tablet (50 mg total) by mouth every evening., Disp: 30 tablet, Rfl: 1    Review of patient's allergies indicates:  No Known Allergies    Past Medical History:   Diagnosis Date    Aortic regurgitation     Back pain     CAD (coronary artery disease)     Chronic back pain     Constipation     COPD (chronic obstructive pulmonary disease)     Generalized OA     Hyperlipidemia     Hypertension     Osteopenia determined by x-ray     Pulmonary stenosis        Past Surgical History:   Procedure Laterality Date    CORONARY ANGIOPLASTY WITH STENT PLACEMENT      FOOT SURGERY  2021    Dr Jackson    HIP SURGERY      LITHOTRIPSY         Family History   Problem Relation Age of Onset    Cancer Mother     Cancer Sister     Cancer Brother        Social History     Tobacco Use    Smoking status: Former Smoker    Smokeless tobacco: Never Used   Substance Use Topics    Alcohol use: Never    Drug use: Never       Review of Systems   Constitutional: Negative for activity change, appetite change, chills, diaphoresis and fatigue.   Respiratory: Negative for shortness  of breath.    Cardiovascular: Negative for chest pain, palpitations, leg swelling and claudication.   Gastrointestinal: Negative for blood in stool, change in bowel habit and change in bowel habit.   Genitourinary: Negative for hematuria.   Musculoskeletal: Negative for arthralgias.   Neurological: Negative for weakness and memory loss.   Psychiatric/Behavioral: Negative for confusion.         Current Medications:   Medication List with Changes/Refills   Current Medications    ACETAMINOPHEN (TYLENOL) 325 MG TABLET    Take 325 mg by mouth every 6 (six) hours as needed for Pain.       Start Date: --        End Date: --    ALBUTEROL (PROVENTIL/VENTOLIN HFA) 90 MCG/ACTUATION INHALER    Inhale 2 puffs into the lungs.       Start Date: --        End Date: --    ASPIRIN (ECOTRIN) 81 MG EC TABLET    Take 1 tablet (81 mg total) by mouth once daily.       Start Date: 8/3/2021  End Date: --    CYANOCOBALAMIN, VITAMIN B-12, 1,000 MCG CAP           Start Date: --        End Date: --    DOCUSATE SODIUM (COLACE) 250 MG CAPSULE    Take 1 capsule (250 mg total) by mouth once daily.       Start Date: 6/20/2022 End Date: --    ERGOCALCIFEROL (ERGOCALCIFEROL) 50,000 UNIT CAP    Take 50,000 Units by mouth every 7 days.       Start Date: --        End Date: --    FERROUS SULFATE (FEOSOL) 325 MG (65 MG IRON) TAB TABLET    Take by mouth.       Start Date: --        End Date: --    HYDROCODONE-ACETAMINOPHEN (NORCO)  MG PER TABLET    Take 1 tablet by mouth every 8 (eight) hours.       Start Date: 7/10/2022 End Date: 8/9/2022    IPRATROPIUM-ALBUTEROL (COMBIVENT RESPIMAT)  MCG/ACTUATION INHALER    Inhale 1 puff into the lungs.       Start Date: --        End Date: --    LINACLOTIDE (LINZESS) 290 MCG CAP CAPSULE    Take 1 capsule (290 mcg total) by mouth before breakfast.       Start Date: 6/20/2022 End Date: --    NALOXEGOL (MOVANTIK) 12.5 MG TAB    Take 1 tablet by mouth every morning.       Start Date: 7/7/2022  End Date:  "10/5/2022    POLYETHYLENE GLYCOL (GLYCOLAX) 17 GRAM PWPK    Take 17 g by mouth once daily.       Start Date: 6/20/2022 End Date: --    TAMSULOSIN (FLOMAX) 0.4 MG CAP    Take 1 capsule (0.4 mg total) by mouth once daily.       Start Date: 3/18/2022 End Date: 6/16/2022   Changed and/or Refilled Medications    Modified Medication Previous Medication    CARVEDILOL (COREG) 6.25 MG TABLET carvediloL (COREG) 6.25 MG tablet       Take 1 tablet (6.25 mg total) by mouth once daily.    Take 1 tablet by mouth once daily       Start Date: 7/21/2022 End Date: 4/17/2023    Start Date: 5/31/2022 End Date: 7/21/2022    LOSARTAN (COZAAR) 50 MG TABLET losartan (COZAAR) 50 MG tablet       Take 1 tablet (50 mg total) by mouth once daily.    Take 1 tablet by mouth once daily       Start Date: 7/21/2022 End Date: --    Start Date: 6/6/2022  End Date: 7/21/2022    NIFEDIPINE (PROCARDIA-XL) 30 MG (OSM) 24 HR TABLET NIFEdipine (PROCARDIA-XL) 30 MG (OSM) 24 hr tablet       Take 1 tablet (30 mg total) by mouth once daily.    Take 1 tablet by mouth once daily       Start Date: 7/21/2022 End Date: --    Start Date: 7/7/2022  End Date: 7/21/2022    SIMVASTATIN (ZOCOR) 80 MG TABLET simvastatin (ZOCOR) 80 MG tablet       Take 1 tablet (80 mg total) by mouth every evening. Take 1/2 tablet once at night.    Take 1 tablet (80 mg total) by mouth every evening. Take 1/2 tablet once at night.       Start Date: 7/21/2022 End Date: --    Start Date: 2/22/2022 End Date: 7/21/2022    TRAZODONE (DESYREL) 50 MG TABLET traZODone (DESYREL) 50 MG tablet       Take 1 tablet (50 mg total) by mouth every evening.    Take 1 tablet (50 mg total) by mouth every evening.       Start Date: 7/21/2022 End Date: --    Start Date: 2/28/2022 End Date: 7/21/2022            Objective:        Vitals:    07/21/22 1254   BP: 116/63   Pulse: 66   Resp: 18   Temp: 97.9 °F (36.6 °C)   SpO2: (!) 93%   Weight: 64 kg (141 lb)   Height: 5' 11" (1.803 m)       Physical Exam          Lab " Results   Component Value Date    WBC 22.10 (H) 02/16/2022    HGB 14.4 02/16/2022    HCT 43.9 02/16/2022     02/16/2022    CHOL 91 02/11/2022    TRIG 122 02/11/2022    HDL 13 (L) 02/11/2022    ALT 7 (L) 02/10/2022    AST 9 (L) 02/10/2022     02/16/2022    K 4.8 02/16/2022     02/16/2022    CREATININE 1.33 (H) 02/16/2022    BUN 53 (H) 02/16/2022    CO2 26 02/16/2022    TSH 0.306 (L) 02/10/2022    INR 1.15 03/23/2020      Assessment:       1. Insomnia, unspecified type    2. Hypertension, unspecified type    3. Hyperlipidemia, unspecified hyperlipidemia type    4. Drug-induced constipation    5. At risk for diabetes mellitus    6. Elevated random blood glucose level        Plan:         Problem List Items Addressed This Visit        Cardiac/Vascular    Hypertension    Relevant Medications    carvediloL (COREG) 6.25 MG tablet    losartan (COZAAR) 50 MG tablet    NIFEdipine (PROCARDIA-XL) 30 MG (OSM) 24 hr tablet    simvastatin (ZOCOR) 80 MG tablet    traZODone (DESYREL) 50 MG tablet    Other Relevant Orders    CBC Auto Differential    Comprehensive Metabolic Panel    Hyperlipidemia    Relevant Medications    carvediloL (COREG) 6.25 MG tablet    losartan (COZAAR) 50 MG tablet    NIFEdipine (PROCARDIA-XL) 30 MG (OSM) 24 hr tablet    simvastatin (ZOCOR) 80 MG tablet    traZODone (DESYREL) 50 MG tablet    Other Relevant Orders    CBC Auto Differential    Comprehensive Metabolic Panel       GI    Constipation    Relevant Medications    carvediloL (COREG) 6.25 MG tablet    losartan (COZAAR) 50 MG tablet    NIFEdipine (PROCARDIA-XL) 30 MG (OSM) 24 hr tablet    simvastatin (ZOCOR) 80 MG tablet    traZODone (DESYREL) 50 MG tablet    Other Relevant Orders    CBC Auto Differential    Comprehensive Metabolic Panel      Other Visit Diagnoses     Insomnia, unspecified type    -  Primary    At risk for diabetes mellitus        Elevated random blood glucose level        Relevant Orders    Hemoglobin A1C             Follow up in about 6 months (around 1/21/2023).    Adore Tracy MD     Instructed patient that if symptoms fail to improve or worsen patient should seek immediate medical attention or report to the nearest emergency department. Patient expressed verbal agreement and understanding to this plan of care.

## 2022-08-08 ENCOUNTER — OFFICE VISIT (OUTPATIENT)
Dept: PAIN MEDICINE | Facility: CLINIC | Age: 87
End: 2022-08-08
Payer: MEDICARE

## 2022-08-08 VITALS
DIASTOLIC BLOOD PRESSURE: 68 MMHG | BODY MASS INDEX: 19.6 KG/M2 | RESPIRATION RATE: 18 BRPM | HEART RATE: 58 BPM | SYSTOLIC BLOOD PRESSURE: 116 MMHG | WEIGHT: 140 LBS | HEIGHT: 71 IN

## 2022-08-08 DIAGNOSIS — M25.572 CHRONIC PAIN OF LEFT ANKLE: Chronic | ICD-10-CM

## 2022-08-08 DIAGNOSIS — M89.49 OSTEOARTHROSIS MULTIPLE SITES, NOT SPECIFIED AS GENERALIZED: Chronic | ICD-10-CM

## 2022-08-08 DIAGNOSIS — M47.817 LUMBOSACRAL SPONDYLOSIS WITHOUT MYELOPATHY: Primary | Chronic | ICD-10-CM

## 2022-08-08 DIAGNOSIS — G89.29 CHRONIC PAIN OF LEFT ANKLE: Chronic | ICD-10-CM

## 2022-08-08 DIAGNOSIS — Z79.899 ENCOUNTER FOR LONG-TERM (CURRENT) USE OF OTHER MEDICATIONS: ICD-10-CM

## 2022-08-08 PROCEDURE — 99214 OFFICE O/P EST MOD 30 MIN: CPT | Mod: PBBFAC | Performed by: PHYSICIAN ASSISTANT

## 2022-08-08 PROCEDURE — 3288F PR FALLS RISK ASSESSMENT DOCUMENTED: ICD-10-PCS | Mod: CPTII,,, | Performed by: PHYSICIAN ASSISTANT

## 2022-08-08 PROCEDURE — 1101F PR PT FALLS ASSESS DOC 0-1 FALLS W/OUT INJ PAST YR: ICD-10-PCS | Mod: CPTII,,, | Performed by: PHYSICIAN ASSISTANT

## 2022-08-08 PROCEDURE — 3288F FALL RISK ASSESSMENT DOCD: CPT | Mod: CPTII,,, | Performed by: PHYSICIAN ASSISTANT

## 2022-08-08 PROCEDURE — G0481 DRUG TEST DEF 8-14 CLASSES: HCPCS | Performed by: PHYSICIAN ASSISTANT

## 2022-08-08 PROCEDURE — 1159F MED LIST DOCD IN RCRD: CPT | Mod: CPTII,,, | Performed by: PHYSICIAN ASSISTANT

## 2022-08-08 PROCEDURE — 99214 PR OFFICE/OUTPT VISIT, EST, LEVL IV, 30-39 MIN: ICD-10-PCS | Mod: S$PBB,,, | Performed by: PHYSICIAN ASSISTANT

## 2022-08-08 PROCEDURE — 1125F PR PAIN SEVERITY QUANTIFIED, PAIN PRESENT: ICD-10-PCS | Mod: CPTII,,, | Performed by: PHYSICIAN ASSISTANT

## 2022-08-08 PROCEDURE — 99214 OFFICE O/P EST MOD 30 MIN: CPT | Mod: S$PBB,,, | Performed by: PHYSICIAN ASSISTANT

## 2022-08-08 PROCEDURE — 1101F PT FALLS ASSESS-DOCD LE1/YR: CPT | Mod: CPTII,,, | Performed by: PHYSICIAN ASSISTANT

## 2022-08-08 PROCEDURE — 1125F AMNT PAIN NOTED PAIN PRSNT: CPT | Mod: CPTII,,, | Performed by: PHYSICIAN ASSISTANT

## 2022-08-08 PROCEDURE — 1159F PR MEDICATION LIST DOCUMENTED IN MEDICAL RECORD: ICD-10-PCS | Mod: CPTII,,, | Performed by: PHYSICIAN ASSISTANT

## 2022-08-08 RX ORDER — HYDROCODONE BITARTRATE AND ACETAMINOPHEN 10; 325 MG/1; MG/1
1 TABLET ORAL EVERY 8 HOURS
Qty: 90 TABLET | Refills: 0 | Status: SHIPPED | OUTPATIENT
Start: 2022-09-12 | End: 2022-08-18

## 2022-08-08 RX ORDER — HYDROCODONE BITARTRATE AND ACETAMINOPHEN 10; 325 MG/1; MG/1
1 TABLET ORAL EVERY 8 HOURS
Qty: 90 TABLET | Refills: 0 | Status: SHIPPED | OUTPATIENT
Start: 2022-10-12 | End: 2022-08-18

## 2022-08-08 RX ORDER — HYDROCODONE BITARTRATE AND ACETAMINOPHEN 10; 325 MG/1; MG/1
1 TABLET ORAL EVERY 8 HOURS
Qty: 90 TABLET | Refills: 0 | Status: SHIPPED | OUTPATIENT
Start: 2022-08-13 | End: 2022-08-18

## 2022-08-08 NOTE — PROGRESS NOTES
Subjective:      Patient ID: Coleman Lares is a 89 y.o. male.    Chief Complaint: Low-back Pain, Joint Pain, and Neck Pain      Pain  This is a chronic problem. The current episode started more than 1 year ago. The problem occurs daily. The problem has been waxing and waning. Associated symptoms include arthralgias and neck pain. Pertinent negatives include no change in bowel habit, chills, coughing, diaphoresis, rash, sore throat, vertigo or vomiting.     Review of Systems   Constitutional: Negative for activity change, chills, diaphoresis and unexpected weight change.   HENT: Negative for drooling, ear discharge, ear pain, facial swelling, mouth sores, nosebleeds, sore throat, trouble swallowing, voice change and goiter.    Eyes: Negative for photophobia, pain, discharge, redness and visual disturbance.   Respiratory: Negative for apnea, cough, choking, chest tightness, shortness of breath, wheezing and stridor.    Cardiovascular: Negative for palpitations and leg swelling.   Gastrointestinal: Negative for abdominal distention, change in bowel habit, diarrhea, rectal pain, vomiting, fecal incontinence and change in bowel habit.   Endocrine: Negative for cold intolerance, heat intolerance, polydipsia, polyphagia and polyuria.   Genitourinary: Negative for flank pain and frequency.   Musculoskeletal: Positive for arthralgias, back pain and neck pain.   Integumentary:  Negative for color change, pallor and rash.   Neurological: Negative for dizziness, vertigo, seizures, syncope, facial asymmetry, speech difficulty, light-headedness, disturbances in coordination, memory loss and coordination difficulties.   Hematological: Negative for adenopathy. Does not bruise/bleed easily.   Psychiatric/Behavioral: Negative for agitation, behavioral problems, confusion, decreased concentration, dysphoric mood, hallucinations, self-injury and suicidal ideas. The patient is not nervous/anxious and is not hyperactive.            "  Past Surgical History:   Procedure Laterality Date    CORONARY ANGIOPLASTY WITH STENT PLACEMENT      FOOT SURGERY  2021    Dr Jackson    HIP SURGERY      LITHOTRIPSY         Objective:  Vitals:    08/08/22 1226   BP: 116/68   Pulse: (!) 58   Resp: 18   Weight: 63.5 kg (140 lb)   Height: 5' 11" (1.803 m)   PainSc:   7         Physical Exam  Vitals and nursing note reviewed. Exam conducted with a chaperone present.   Constitutional:       General: He is awake.      Appearance: Normal appearance. He is not ill-appearing, toxic-appearing or diaphoretic.   HENT:      Head: Normocephalic and atraumatic.      Nose: Nose normal.      Mouth/Throat:      Mouth: Mucous membranes are moist.      Pharynx: Oropharynx is clear.   Eyes:      Conjunctiva/sclera: Conjunctivae normal.      Pupils: Pupils are equal, round, and reactive to light.   Cardiovascular:      Rate and Rhythm: Normal rate.   Pulmonary:      Effort: Pulmonary effort is normal. No respiratory distress.   Abdominal:      Palpations: Abdomen is soft.   Musculoskeletal:         General: Normal range of motion.      Cervical back: Normal range of motion and neck supple. Tenderness present.      Lumbar back: Tenderness present.   Skin:     General: Skin is warm and dry.   Neurological:      General: No focal deficit present.      Mental Status: He is alert and oriented to person, place, and time. Mental status is at baseline.      Cranial Nerves: No cranial nerve deficit (II-XII).   Psychiatric:         Mood and Affect: Mood normal.         Behavior: Behavior normal. Behavior is cooperative.         Thought Content: Thought content normal.           Orders Placed This Encounter   Procedures    Miscellaneous Test, Sendout oral drug screen     Standing Status:   Future     Number of Occurrences:   1     Standing Expiration Date:   10/7/2023     Order Specific Question:   What is the name of the test you wish to perform?     Answer:   oral drug screen        NM " Lung Scan Ventilation Perfusion  Narrative: EXAMINATION:  NM LUNG VENTILATION AND PERFUSION IMAGING    CLINICAL HISTORY:  Pulmonary embolism (PE) suspected, positive D-dimer;    COMPARISON:  None.    FINDINGS:  Ventilation scan: The patient received 40.0 mCi of technetium 99m DTPA aerosolized.    There is normal distribution of radiotracer in both lungs.    Perfusion scan: Patient received 4.0 mCi of technetium 99m MAA intravenously.    There is normal in distribution of radiotracer in both lungs without evidence of segmental or greater defects.  Impression: Normal nuclear medicine ventilation perfusion scan. This indicates low probability for pulmonary embolism.    Electronically signed by: Simon Virgen  Date:    02/11/2022  Time:    13:39       Office Visit on 05/03/2022   Component Date Value Ref Range Status    POC Amphetamines 05/03/2022 Negative  Negative, Inconclusive Final    POC Barbiturates 05/03/2022 Negative  Negative, Inconclusive Final    POC Benzodiazepines 05/03/2022 Negative  Negative, Inconclusive Final    POC Cocaine 05/03/2022 Negative  Negative, Inconclusive Final    POC THC 05/03/2022 Negative  Negative, Inconclusive Final    POC Methadone 05/03/2022 Negative  Negative, Inconclusive Final    POC Methamphetamine 05/03/2022 Negative  Negative, Inconclusive Final    POC Opiates 05/03/2022 Presumptive Positive (A) Negative, Inconclusive Final    POC Oxycodone 05/03/2022 Negative  Negative, Inconclusive Final    POC Phencyclidine 05/03/2022 Negative  Negative, Inconclusive Final    POC Methylenedioxymethamphetamine * 05/03/2022 Negative  Negative, Inconclusive Final    POC Tricyclic Antidepressants 05/03/2022 Negative  Negative, Inconclusive Final    POC Buprenorphine 05/03/2022 Negative   Final     Acceptable 05/03/2022 Yes   Final    POC Temperature (Urine) 05/03/2022 92   Final   No results displayed because visit has over 200 results.            Assessment:       1. Lumbosacral spondylosis without myelopathy    2. Osteoarthrosis multiple sites, not specified as generalized    3. Chronic pain of left ankle history ORIF    4. Encounter for long-term (current) use of other medications            A's of Opioid Risk Assessment  Activity:Patient can perform ADL.   Analgesia:Patients pain is partially controlled by current medication. Patient has tried OTC medications such as Tylenol and Ibuprofen with out relief.   Adverse Effects: Patient denies constipation or sedation.  Aberrant Behavior:  reviewed with no aberrant drug seeking/taking behavior.  Overdose reversal drug naloxone discussed    Drug screen reviewed      Requested Prescriptions      No prescriptions requested or ordered in this encounter         Plan:    Definitive oral swab drug screen results returned August 18, 2022 drug screen was collected August 8, 2022 positive for methamphetamine will discontinue narcotics    History left ankle ORIF    Using 4 point rolling walker assistance with ambulation    He states he continues to deal chronic joint back pain he states current medications helping control his discomfort he would like to continue with conservative management    Continue home exercise program as directed    Continue current medication    Follow-up 3 months    Dr. Beard, August 2022    Pill count    Bring original prescription medication bottles/container/box with labels to each visit

## 2022-08-18 LAB — BEAKER SEE SCANNED REPORT: NORMAL

## 2022-10-27 ENCOUNTER — OFFICE VISIT (OUTPATIENT)
Dept: FAMILY MEDICINE | Facility: CLINIC | Age: 87
End: 2022-10-27
Payer: MEDICARE

## 2022-10-27 VITALS
HEART RATE: 93 BPM | WEIGHT: 136.81 LBS | SYSTOLIC BLOOD PRESSURE: 105 MMHG | HEIGHT: 71 IN | DIASTOLIC BLOOD PRESSURE: 67 MMHG | RESPIRATION RATE: 20 BRPM | BODY MASS INDEX: 19.15 KG/M2 | OXYGEN SATURATION: 95 % | TEMPERATURE: 98 F

## 2022-10-27 DIAGNOSIS — K59.03 DRUG-INDUCED CONSTIPATION: ICD-10-CM

## 2022-10-27 DIAGNOSIS — R73.09 ELEVATED RANDOM BLOOD GLUCOSE LEVEL: ICD-10-CM

## 2022-10-27 DIAGNOSIS — E78.5 HYPERLIPIDEMIA, UNSPECIFIED HYPERLIPIDEMIA TYPE: ICD-10-CM

## 2022-10-27 DIAGNOSIS — Z23 FLU VACCINE NEED: Primary | ICD-10-CM

## 2022-10-27 DIAGNOSIS — E87.5 HYPERKALEMIA: ICD-10-CM

## 2022-10-27 DIAGNOSIS — I10 HYPERTENSION, UNSPECIFIED TYPE: ICD-10-CM

## 2022-10-27 PROCEDURE — 3288F PR FALLS RISK ASSESSMENT DOCUMENTED: ICD-10-PCS | Mod: ,,, | Performed by: FAMILY MEDICINE

## 2022-10-27 PROCEDURE — G0008 FLU VACCINE - QUADRIVALENT - ADJUVANTED: ICD-10-PCS | Mod: ,,, | Performed by: FAMILY MEDICINE

## 2022-10-27 PROCEDURE — 3288F FALL RISK ASSESSMENT DOCD: CPT | Mod: ,,, | Performed by: FAMILY MEDICINE

## 2022-10-27 PROCEDURE — 80053 COMPREHEN METABOLIC PANEL: CPT | Mod: ,,, | Performed by: CLINICAL MEDICAL LABORATORY

## 2022-10-27 PROCEDURE — 85025 CBC WITH DIFFERENTIAL: ICD-10-PCS | Mod: ,,, | Performed by: CLINICAL MEDICAL LABORATORY

## 2022-10-27 PROCEDURE — 90694 VACC AIIV4 NO PRSRV 0.5ML IM: CPT | Mod: ,,, | Performed by: FAMILY MEDICINE

## 2022-10-27 PROCEDURE — 80053 COMPREHENSIVE METABOLIC PANEL: ICD-10-PCS | Mod: ,,, | Performed by: CLINICAL MEDICAL LABORATORY

## 2022-10-27 PROCEDURE — 1160F RVW MEDS BY RX/DR IN RCRD: CPT | Mod: ,,, | Performed by: FAMILY MEDICINE

## 2022-10-27 PROCEDURE — 99213 OFFICE O/P EST LOW 20 MIN: CPT | Mod: 25,,, | Performed by: FAMILY MEDICINE

## 2022-10-27 PROCEDURE — 1160F PR REVIEW ALL MEDS BY PRESCRIBER/CLIN PHARMACIST DOCUMENTED: ICD-10-PCS | Mod: ,,, | Performed by: FAMILY MEDICINE

## 2022-10-27 PROCEDURE — 83036 HEMOGLOBIN A1C: ICD-10-PCS | Mod: ,,, | Performed by: CLINICAL MEDICAL LABORATORY

## 2022-10-27 PROCEDURE — 1159F PR MEDICATION LIST DOCUMENTED IN MEDICAL RECORD: ICD-10-PCS | Mod: ,,, | Performed by: FAMILY MEDICINE

## 2022-10-27 PROCEDURE — G0008 ADMIN INFLUENZA VIRUS VAC: HCPCS | Mod: ,,, | Performed by: FAMILY MEDICINE

## 2022-10-27 PROCEDURE — 1101F PT FALLS ASSESS-DOCD LE1/YR: CPT | Mod: ,,, | Performed by: FAMILY MEDICINE

## 2022-10-27 PROCEDURE — 99213 PR OFFICE/OUTPT VISIT, EST, LEVL III, 20-29 MIN: ICD-10-PCS | Mod: 25,,, | Performed by: FAMILY MEDICINE

## 2022-10-27 PROCEDURE — 85025 COMPLETE CBC W/AUTO DIFF WBC: CPT | Mod: ,,, | Performed by: CLINICAL MEDICAL LABORATORY

## 2022-10-27 PROCEDURE — 1159F MED LIST DOCD IN RCRD: CPT | Mod: ,,, | Performed by: FAMILY MEDICINE

## 2022-10-27 PROCEDURE — 90694 FLU VACCINE - QUADRIVALENT - ADJUVANTED: ICD-10-PCS | Mod: ,,, | Performed by: FAMILY MEDICINE

## 2022-10-27 PROCEDURE — 83036 HEMOGLOBIN GLYCOSYLATED A1C: CPT | Mod: ,,, | Performed by: CLINICAL MEDICAL LABORATORY

## 2022-10-27 PROCEDURE — 1101F PR PT FALLS ASSESS DOC 0-1 FALLS W/OUT INJ PAST YR: ICD-10-PCS | Mod: ,,, | Performed by: FAMILY MEDICINE

## 2022-10-27 NOTE — PROGRESS NOTES
I have reviewed the notes, assessments, and/or procedures performed by Dr. Tracy, I concur with her documentation of Coleman Lares.

## 2022-10-27 NOTE — PROGRESS NOTES
Subjective:       Patient ID: Coleman Lares is a 89 y.o. male.    Chief Complaint: Follow-up    Pt presents to clinic for a routine fu. Has no complaints today. Says BP at home runs between 140-145 systolic. Says insomnia is manageable and only gets it occassionally. Constipation is still an issue for this patient. He says he takes colace and miralax, sometimes together and will still only have 1 bowel movement a week some weeks. Pt says he drinks a lot of water and follows a healthy diet. Says stool are hard, but denies blood or mucus. Pt is agreeable to getting the high dose flu vaccine today. Does take opioids for chronic back pain on a regular basis.         Current Outpatient Medications:     acetaminophen (TYLENOL) 325 MG tablet, Take 325 mg by mouth every 6 (six) hours as needed for Pain., Disp: , Rfl:     albuterol (PROVENTIL/VENTOLIN HFA) 90 mcg/actuation inhaler, Inhale 2 puffs into the lungs., Disp: , Rfl:     aspirin (ECOTRIN) 81 MG EC tablet, Take 1 tablet (81 mg total) by mouth once daily., Disp: 30 tablet, Rfl: 5    carvediloL (COREG) 6.25 MG tablet, Take 1 tablet (6.25 mg total) by mouth once daily., Disp: 90 tablet, Rfl: 2    cyanocobalamin, vitamin B-12, 1,000 mcg Cap, , Disp: , Rfl:     docusate sodium (COLACE) 250 MG capsule, Take 1 capsule (250 mg total) by mouth once daily., Disp: 30 capsule, Rfl: 3    ergocalciferol (ERGOCALCIFEROL) 50,000 unit Cap, Take 50,000 Units by mouth every 7 days., Disp: , Rfl:     ferrous sulfate (FEOSOL) 325 mg (65 mg iron) Tab tablet, Take by mouth., Disp: , Rfl:     ipratropium-albuteroL (COMBIVENT RESPIMAT)  mcg/actuation inhaler, Inhale 1 puff into the lungs., Disp: , Rfl:     linaCLOtide (LINZESS) 290 mcg Cap capsule, Take 1 capsule (290 mcg total) by mouth before breakfast., Disp: 30 capsule, Rfl: 3    losartan (COZAAR) 50 MG tablet, Take 1 tablet (50 mg total) by mouth once daily., Disp: 90 tablet, Rfl: 2    NIFEdipine (PROCARDIA-XL) 30 MG (OSM) 24 hr  tablet, Take 1 tablet (30 mg total) by mouth once daily., Disp: 90 tablet, Rfl: 2    polyethylene glycol (GLYCOLAX) 17 gram PwPk, Take 17 g by mouth once daily., Disp: 30 each, Rfl: 3    simvastatin (ZOCOR) 80 MG tablet, Take 1 tablet (80 mg total) by mouth every evening. Take 1/2 tablet once at night., Disp: 30 tablet, Rfl: 2    tamsulosin (FLOMAX) 0.4 mg Cap, Take 1 capsule by mouth once daily, Disp: 90 capsule, Rfl: 0    tamsulosin (FLOMAX) 0.4 mg Cap, Take 1 capsule (0.4 mg total) by mouth once daily., Disp: 90 capsule, Rfl: 0    traZODone (DESYREL) 50 MG tablet, Take 1 tablet (50 mg total) by mouth every evening., Disp: 30 tablet, Rfl: 1    magnesium hydroxide (DE LEON CHEWS) 311 MG Chew, Take 1 tablet (311 mg total) by mouth 2 (two) times daily as needed (for severe constipation)., Disp: 120 tablet, Rfl: 0    Review of patient's allergies indicates:  No Known Allergies    Past Medical History:   Diagnosis Date    Aortic regurgitation     Back pain     CAD (coronary artery disease)     Chronic back pain     Constipation     COPD (chronic obstructive pulmonary disease)     Generalized OA     Hyperlipidemia     Hypertension     Osteopenia determined by x-ray     Pulmonary stenosis        Past Surgical History:   Procedure Laterality Date    CORONARY ANGIOPLASTY WITH STENT PLACEMENT      FOOT SURGERY  2021    Dr Jackson    HIP SURGERY      LITHOTRIPSY         Family History   Problem Relation Age of Onset    Cancer Mother     Cancer Sister     Cancer Brother        Social History     Tobacco Use    Smoking status: Former    Smokeless tobacco: Never   Substance Use Topics    Alcohol use: Never    Drug use: Never       Review of Systems   Constitutional:  Negative for activity change, appetite change, chills, diaphoresis, fatigue and fever.   Respiratory:  Negative for shortness of breath.    Cardiovascular:  Negative for chest pain and palpitations.   Gastrointestinal:  Positive for constipation. Negative for  abdominal pain, anal bleeding, blood in stool, change in bowel habit, nausea, vomiting and change in bowel habit.   Endocrine: Negative for polyuria.   Genitourinary:  Negative for enuresis, hematuria and urgency.   Musculoskeletal:  Positive for arthralgias and back pain.   Neurological:  Negative for dizziness.   Psychiatric/Behavioral:  Negative for confusion and dysphoric mood.        Current Medications:   Medication List with Changes/Refills   New Medications    MAGNESIUM HYDROXIDE (DE LEON CHEWS) 311 MG CHEW    Take 1 tablet (311 mg total) by mouth 2 (two) times daily as needed (for severe constipation).       Start Date: 10/27/2022End Date: 10/27/2023   Current Medications    ACETAMINOPHEN (TYLENOL) 325 MG TABLET    Take 325 mg by mouth every 6 (six) hours as needed for Pain.       Start Date: --        End Date: --    ALBUTEROL (PROVENTIL/VENTOLIN HFA) 90 MCG/ACTUATION INHALER    Inhale 2 puffs into the lungs.       Start Date: --        End Date: --    ASPIRIN (ECOTRIN) 81 MG EC TABLET    Take 1 tablet (81 mg total) by mouth once daily.       Start Date: 8/3/2021  End Date: --    CARVEDILOL (COREG) 6.25 MG TABLET    Take 1 tablet (6.25 mg total) by mouth once daily.       Start Date: 7/21/2022 End Date: 4/17/2023    CYANOCOBALAMIN, VITAMIN B-12, 1,000 MCG CAP           Start Date: --        End Date: --    DOCUSATE SODIUM (COLACE) 250 MG CAPSULE    Take 1 capsule (250 mg total) by mouth once daily.       Start Date: 6/20/2022 End Date: --    ERGOCALCIFEROL (ERGOCALCIFEROL) 50,000 UNIT CAP    Take 50,000 Units by mouth every 7 days.       Start Date: --        End Date: --    FERROUS SULFATE (FEOSOL) 325 MG (65 MG IRON) TAB TABLET    Take by mouth.       Start Date: --        End Date: --    IPRATROPIUM-ALBUTEROL (COMBIVENT RESPIMAT)  MCG/ACTUATION INHALER    Inhale 1 puff into the lungs.       Start Date: --        End Date: --    LINACLOTIDE (LINZESS) 290 MCG CAP CAPSULE    Take 1 capsule (290 mcg  "total) by mouth before breakfast.       Start Date: 6/20/2022 End Date: --    LOSARTAN (COZAAR) 50 MG TABLET    Take 1 tablet (50 mg total) by mouth once daily.       Start Date: 7/21/2022 End Date: --    NIFEDIPINE (PROCARDIA-XL) 30 MG (OSM) 24 HR TABLET    Take 1 tablet (30 mg total) by mouth once daily.       Start Date: 7/21/2022 End Date: --    POLYETHYLENE GLYCOL (GLYCOLAX) 17 GRAM PWPK    Take 17 g by mouth once daily.       Start Date: 6/20/2022 End Date: --    SIMVASTATIN (ZOCOR) 80 MG TABLET    Take 1 tablet (80 mg total) by mouth every evening. Take 1/2 tablet once at night.       Start Date: 7/21/2022 End Date: --    TAMSULOSIN (FLOMAX) 0.4 MG CAP    Take 1 capsule by mouth once daily       Start Date: 8/31/2022 End Date: --    TAMSULOSIN (FLOMAX) 0.4 MG CAP    Take 1 capsule (0.4 mg total) by mouth once daily.       Start Date: 8/15/2022 End Date: 11/13/2022    TRAZODONE (DESYREL) 50 MG TABLET    Take 1 tablet (50 mg total) by mouth every evening.       Start Date: 7/21/2022 End Date: --            Objective:        Vitals:    10/27/22 0817   BP: 105/67   BP Location: Left arm   Patient Position: Sitting   BP Method: Medium (Automatic)   Pulse: 93   Resp: 20   Temp: 97.7 °F (36.5 °C)   TempSrc: Oral   SpO2: 95%   Weight: 62.1 kg (136 lb 12.8 oz)   Height: 5' 11" (1.803 m)       Physical Exam  Constitutional:       Appearance: Normal appearance.   HENT:      Head: Normocephalic and atraumatic.      Mouth/Throat:      Mouth: Mucous membranes are moist.      Pharynx: Oropharynx is clear.   Eyes:      Extraocular Movements: Extraocular movements intact.      Conjunctiva/sclera: Conjunctivae normal.      Pupils: Pupils are equal, round, and reactive to light.   Cardiovascular:      Rate and Rhythm: Normal rate and regular rhythm.      Pulses: Normal pulses.      Heart sounds: Normal heart sounds.   Pulmonary:      Effort: Pulmonary effort is normal.      Breath sounds: Normal breath sounds.   Abdominal:      " General: Abdomen is flat. Bowel sounds are normal.      Palpations: Abdomen is soft.   Musculoskeletal:         General: Normal range of motion.   Skin:     General: Skin is warm.      Capillary Refill: Capillary refill takes less than 2 seconds.   Neurological:      General: No focal deficit present.      Mental Status: He is alert.           Lab Results   Component Value Date    WBC 22.10 (H) 02/16/2022    HGB 14.4 02/16/2022    HCT 43.9 02/16/2022     02/16/2022    CHOL 91 02/11/2022    TRIG 122 02/11/2022    HDL 13 (L) 02/11/2022    ALT 7 (L) 02/10/2022    AST 9 (L) 02/10/2022     02/16/2022    K 4.8 02/16/2022     02/16/2022    CREATININE 1.33 (H) 02/16/2022    BUN 53 (H) 02/16/2022    CO2 26 02/16/2022    TSH 0.306 (L) 02/10/2022    INR 1.15 03/23/2020      Assessment:       1. Flu vaccine need    2. Hypertension, unspecified type    3. Hyperlipidemia, unspecified hyperlipidemia type    4. Drug-induced constipation    5. Elevated random blood glucose level          Plan:         Problem List Items Addressed This Visit          Cardiac/Vascular    Hypertension    Hyperlipidemia       GI    Constipation     Other Visit Diagnoses       Flu vaccine need    -  Primary    Relevant Orders    Influenza (FLUAD) - Quadrivalent (Adjuvanted) *Preferred* (65+) (PF) (Completed)    Elevated random blood glucose level                  Follow up in about 6 months (around 4/27/2023).    Adore Tracy MD     Instructed patient that if symptoms fail to improve or worsen patient should seek immediate medical attention or report to the nearest emergency department. Patient expressed verbal agreement and understanding to this plan of care.

## 2022-10-28 LAB
ALBUMIN SERPL BCP-MCNC: 3.8 G/DL (ref 3.5–5)
ALBUMIN/GLOB SERPL: 1.1 {RATIO}
ALP SERPL-CCNC: 86 U/L (ref 45–115)
ALT SERPL W P-5'-P-CCNC: 20 U/L (ref 16–61)
ANION GAP SERPL CALCULATED.3IONS-SCNC: 12 MMOL/L (ref 7–16)
AST SERPL W P-5'-P-CCNC: 8 U/L (ref 15–37)
BASOPHILS # BLD AUTO: 0.05 K/UL (ref 0–0.2)
BASOPHILS NFR BLD AUTO: 0.7 % (ref 0–1)
BILIRUB SERPL-MCNC: 0.4 MG/DL (ref ?–1.2)
BUN SERPL-MCNC: 34 MG/DL (ref 7–18)
BUN/CREAT SERPL: 26 (ref 6–20)
CALCIUM SERPL-MCNC: 9.6 MG/DL (ref 8.5–10.1)
CHLORIDE SERPL-SCNC: 108 MMOL/L (ref 98–107)
CO2 SERPL-SCNC: 24 MMOL/L (ref 21–32)
CREAT SERPL-MCNC: 1.3 MG/DL (ref 0.7–1.3)
DIFFERENTIAL METHOD BLD: ABNORMAL
EGFR (NO RACE VARIABLE) (RUSH/TITUS): 53 ML/MIN/1.73M²
EOSINOPHIL # BLD AUTO: 0.35 K/UL (ref 0–0.5)
EOSINOPHIL NFR BLD AUTO: 4.8 % (ref 1–4)
ERYTHROCYTE [DISTWIDTH] IN BLOOD BY AUTOMATED COUNT: 16 % (ref 11.5–14.5)
EST. AVERAGE GLUCOSE BLD GHB EST-MCNC: 110 MG/DL
GLOBULIN SER-MCNC: 3.4 G/DL (ref 2–4)
GLUCOSE SERPL-MCNC: 106 MG/DL (ref 74–106)
HBA1C MFR BLD HPLC: 5.9 % (ref 4.5–6.6)
HCT VFR BLD AUTO: 37.5 % (ref 40–54)
HGB BLD-MCNC: 11.3 G/DL (ref 13.5–18)
IMM GRANULOCYTES # BLD AUTO: 0.02 K/UL (ref 0–0.04)
IMM GRANULOCYTES NFR BLD: 0.3 % (ref 0–0.4)
LYMPHOCYTES # BLD AUTO: 2.05 K/UL (ref 1–4.8)
LYMPHOCYTES NFR BLD AUTO: 28 % (ref 27–41)
MCH RBC QN AUTO: 27.8 PG (ref 27–31)
MCHC RBC AUTO-ENTMCNC: 30.1 G/DL (ref 32–36)
MCV RBC AUTO: 92.1 FL (ref 80–96)
MONOCYTES # BLD AUTO: 0.52 K/UL (ref 0–0.8)
MONOCYTES NFR BLD AUTO: 7.1 % (ref 2–6)
MPC BLD CALC-MCNC: 10.5 FL (ref 9.4–12.4)
NEUTROPHILS # BLD AUTO: 4.33 K/UL (ref 1.8–7.7)
NEUTROPHILS NFR BLD AUTO: 59.1 % (ref 53–65)
NRBC # BLD AUTO: 0 X10E3/UL
NRBC, AUTO (.00): 0 %
PLATELET # BLD AUTO: 308 K/UL (ref 150–400)
POTASSIUM SERPL-SCNC: 5.5 MMOL/L (ref 3.5–5.1)
PROT SERPL-MCNC: 7.2 G/DL (ref 6.4–8.2)
RBC # BLD AUTO: 4.07 M/UL (ref 4.6–6.2)
SODIUM SERPL-SCNC: 138 MMOL/L (ref 136–145)
WBC # BLD AUTO: 7.32 K/UL (ref 4.5–11)

## 2022-11-03 DIAGNOSIS — N40.0 BENIGN PROSTATIC HYPERPLASIA, UNSPECIFIED WHETHER LOWER URINARY TRACT SYMPTOMS PRESENT: ICD-10-CM

## 2022-11-03 DIAGNOSIS — E87.5 HYPERKALEMIA: ICD-10-CM

## 2022-11-03 LAB — POTASSIUM SERPL-SCNC: 4.7 MMOL/L (ref 3.5–5.1)

## 2022-11-03 PROCEDURE — 84132 POTASSIUM: ICD-10-PCS | Mod: ,,, | Performed by: CLINICAL MEDICAL LABORATORY

## 2022-11-03 PROCEDURE — 84132 ASSAY OF SERUM POTASSIUM: CPT | Mod: ,,, | Performed by: CLINICAL MEDICAL LABORATORY

## 2022-11-03 RX ORDER — TAMSULOSIN HYDROCHLORIDE 0.4 MG/1
0.4 CAPSULE ORAL DAILY
Qty: 90 CAPSULE | Refills: 2 | Status: SHIPPED | OUTPATIENT
Start: 2022-11-03 | End: 2023-10-17

## 2022-11-07 ENCOUNTER — OFFICE VISIT (OUTPATIENT)
Dept: PAIN MEDICINE | Facility: CLINIC | Age: 87
End: 2022-11-07
Payer: MEDICARE

## 2022-11-07 VITALS
BODY MASS INDEX: 19.32 KG/M2 | DIASTOLIC BLOOD PRESSURE: 53 MMHG | WEIGHT: 138 LBS | HEART RATE: 65 BPM | RESPIRATION RATE: 20 BRPM | SYSTOLIC BLOOD PRESSURE: 92 MMHG | HEIGHT: 71 IN

## 2022-11-07 DIAGNOSIS — G89.29 CHRONIC PAIN OF LEFT ANKLE: Chronic | ICD-10-CM

## 2022-11-07 DIAGNOSIS — M89.49 OSTEOARTHROSIS MULTIPLE SITES, NOT SPECIFIED AS GENERALIZED: Chronic | ICD-10-CM

## 2022-11-07 DIAGNOSIS — M25.572 CHRONIC PAIN OF LEFT ANKLE: Chronic | ICD-10-CM

## 2022-11-07 DIAGNOSIS — M47.817 LUMBOSACRAL SPONDYLOSIS WITHOUT MYELOPATHY: Primary | Chronic | ICD-10-CM

## 2022-11-07 DIAGNOSIS — G89.29 CHRONIC BILATERAL LOW BACK PAIN WITHOUT SCIATICA: Chronic | ICD-10-CM

## 2022-11-07 DIAGNOSIS — M54.50 CHRONIC BILATERAL LOW BACK PAIN WITHOUT SCIATICA: Chronic | ICD-10-CM

## 2022-11-07 PROCEDURE — 3288F PR FALLS RISK ASSESSMENT DOCUMENTED: ICD-10-PCS | Mod: CPTII,,, | Performed by: PAIN MEDICINE

## 2022-11-07 PROCEDURE — 1159F PR MEDICATION LIST DOCUMENTED IN MEDICAL RECORD: ICD-10-PCS | Mod: CPTII,,, | Performed by: PAIN MEDICINE

## 2022-11-07 PROCEDURE — 1101F PT FALLS ASSESS-DOCD LE1/YR: CPT | Mod: CPTII,,, | Performed by: PAIN MEDICINE

## 2022-11-07 PROCEDURE — 1159F MED LIST DOCD IN RCRD: CPT | Mod: CPTII,,, | Performed by: PAIN MEDICINE

## 2022-11-07 PROCEDURE — 1125F AMNT PAIN NOTED PAIN PRSNT: CPT | Mod: CPTII,,, | Performed by: PAIN MEDICINE

## 2022-11-07 PROCEDURE — 1101F PR PT FALLS ASSESS DOC 0-1 FALLS W/OUT INJ PAST YR: ICD-10-PCS | Mod: CPTII,,, | Performed by: PAIN MEDICINE

## 2022-11-07 PROCEDURE — 99215 OFFICE O/P EST HI 40 MIN: CPT | Mod: PBBFAC | Performed by: PAIN MEDICINE

## 2022-11-07 PROCEDURE — 99214 PR OFFICE/OUTPT VISIT, EST, LEVL IV, 30-39 MIN: ICD-10-PCS | Mod: S$PBB,,, | Performed by: PAIN MEDICINE

## 2022-11-07 PROCEDURE — 1125F PR PAIN SEVERITY QUANTIFIED, PAIN PRESENT: ICD-10-PCS | Mod: CPTII,,, | Performed by: PAIN MEDICINE

## 2022-11-07 PROCEDURE — 99214 OFFICE O/P EST MOD 30 MIN: CPT | Mod: S$PBB,,, | Performed by: PAIN MEDICINE

## 2022-11-07 PROCEDURE — 3288F FALL RISK ASSESSMENT DOCD: CPT | Mod: CPTII,,, | Performed by: PAIN MEDICINE

## 2022-11-07 RX ORDER — METHOCARBAMOL 500 MG/1
500 TABLET, FILM COATED ORAL 3 TIMES DAILY
Qty: 90 TABLET | Refills: 2 | Status: SHIPPED | OUTPATIENT
Start: 2022-11-07 | End: 2023-02-05

## 2022-11-07 RX ORDER — MELOXICAM 7.5 MG/1
7.5 TABLET ORAL DAILY
Qty: 30 TABLET | Refills: 2 | Status: SHIPPED | OUTPATIENT
Start: 2022-11-07

## 2022-11-07 NOTE — PROGRESS NOTES
"She Disclaimer: This note has been generated using voice-recognition software. There may be typographical errors that have been missed during proof-reading        Patient ID: Coleman Lares is a 89 y.o. male.      Chief Complaint: Low-back Pain and Leg Pain (Left ankle)      89-year-old male returns for re-evaluation of chronic lower back pain.  He has been treated conservatively with opioid maintenance for several years.  He deferred physical therapy and nerve block injections.  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  Patient states that he is unsure what "the girl next door" gave him but he will get to the bottom of it.  He returns today requesting opioids.  I explained that I can not refill  narcotics based on illicit substances detected  on his last urine drug screen.  I will prescribed Mobic 7.5 and Robaxin for chronic lower back pain. He is not a candidate for  opioid maintenance due to increased risk of overdose and death.          Pain Assessment  Pain Assessment: 0-10  Pain Score:   7  Pain Location: Back  Pain Descriptors: Nagging, Aching, Constant  Pain Frequency: Continuous  Pain Onset: Awakened from sleep  Clinical Progression: Not changed  Aggravating Factors: Bending, Kneeling, Squatting, Standing, Walking, Stairs  Pain Intervention(s): Ambulation/increased activity, Rest      A's of Opioid Risk Assessment  Activity:Patient can not perform ADL.   Analgesia:Patients pain is not controlled by current medication.   Adverse Effects: Patient denies constipation or sedation.  Aberrant Behavior:  reviewed with no aberrant drug seeking/taking behavior.      Patient denies any suicidal or homicidal ideations    Physical Therapy/Home Exercise: no      NM Lung Scan Ventilation Perfusion  Narrative: EXAMINATION:  NM LUNG VENTILATION AND PERFUSION IMAGING    CLINICAL HISTORY:  Pulmonary embolism (PE) suspected, positive D-dimer;    COMPARISON:  None.    FINDINGS:  Ventilation " scan: The patient received 40.0 mCi of technetium 99m DTPA aerosolized.    There is normal distribution of radiotracer in both lungs.    Perfusion scan: Patient received 4.0 mCi of technetium 99m MAA intravenously.    There is normal in distribution of radiotracer in both lungs without evidence of segmental or greater defects.  Impression: Normal nuclear medicine ventilation perfusion scan. This indicates low probability for pulmonary embolism.    Electronically signed by: Simon Virgen  Date:    02/11/2022  Time:    13:39      Review of Systems   Constitutional: Negative.    HENT: Negative.     Eyes: Negative.    Respiratory: Negative.     Cardiovascular: Negative.    Gastrointestinal: Negative.    Endocrine: Negative.    Genitourinary: Negative.    Musculoskeletal:  Positive for arthralgias, back pain and gait problem.   Integumentary:  Negative.   Allergic/Immunologic: Negative.    Hematological: Negative.    Psychiatric/Behavioral:  Positive for sleep disturbance.            Past Medical History:   Diagnosis Date    Aortic regurgitation     Back pain     CAD (coronary artery disease)     Chronic back pain     Constipation     COPD (chronic obstructive pulmonary disease)     Generalized OA     Hyperlipidemia     Hypertension     Osteopenia determined by x-ray     Pulmonary stenosis      Past Surgical History:   Procedure Laterality Date    CORONARY ANGIOPLASTY WITH STENT PLACEMENT      FOOT SURGERY  2021    Dr Jackson    HIP SURGERY      LITHOTRIPSY       Social History     Socioeconomic History    Marital status: Single   Tobacco Use    Smoking status: Former    Smokeless tobacco: Never   Substance and Sexual Activity    Alcohol use: Never    Drug use: Never    Sexual activity: Not Currently     Family History   Problem Relation Age of Onset    Cancer Mother     Cancer Sister     Cancer Brother      Review of patient's allergies indicates:  No Known Allergies  has a current medication list which includes the  following prescription(s): acetaminophen, albuterol, aspirin, carvedilol, cyanocobalamin (vitamin b-12), docusate sodium, ergocalciferol, ferrous sulfate, combivent respimat, linaclotide, losartan, magnesium hydroxide, nifedipine, polyethylene glycol, simvastatin, tamsulosin, trazodone, meloxicam, and methocarbamol.      Objective:  Vitals:    11/07/22 1015   BP: (!) 92/53   Pulse: 65   Resp: 20        Physical Exam  Vitals and nursing note reviewed.   Constitutional:       General: He is not in acute distress.     Appearance: Normal appearance. He is not ill-appearing, toxic-appearing or diaphoretic.   HENT:      Head: Normocephalic and atraumatic.      Nose: Nose normal.      Mouth/Throat:      Mouth: Mucous membranes are moist.   Eyes:      Extraocular Movements: Extraocular movements intact.      Pupils: Pupils are equal, round, and reactive to light.   Cardiovascular:      Rate and Rhythm: Normal rate and regular rhythm.      Heart sounds: Normal heart sounds.   Pulmonary:      Effort: Pulmonary effort is normal. No respiratory distress.      Breath sounds: Normal breath sounds. No stridor. No wheezing or rhonchi.   Abdominal:      General: Bowel sounds are normal.      Palpations: Abdomen is soft.   Musculoskeletal:         General: No swelling or deformity.      Cervical back: Normal and normal range of motion. No spasms or tenderness. No pain with movement. Normal range of motion.      Thoracic back: Normal.      Lumbar back: Tenderness and bony tenderness present. No spasms. Decreased range of motion. Negative right straight leg raise test and negative left straight leg raise test. No scoliosis.      Right lower leg: No edema.      Left lower leg: No edema.   Skin:     General: Skin is warm.   Neurological:      General: No focal deficit present.      Mental Status: He is alert and oriented to person, place, and time. Mental status is at baseline.      Cranial Nerves: No cranial nerve deficit.      Sensory:  Sensation is intact. No sensory deficit.      Motor: No weakness.      Coordination: Coordination normal.      Gait: Gait normal.      Deep Tendon Reflexes: Reflexes are normal and symmetric.   Psychiatric:         Mood and Affect: Mood normal.         Behavior: Behavior normal.         Assessment:      1. Lumbosacral spondylosis without myelopathy    2. Osteoarthrosis multiple sites, not specified as generalized    3. Chronic bilateral low back pain without sciatica    4. Chronic pain of left ankle history ORIF          Plan:  1. reviewed  2.Addiction, Dependency, Tolerance, Opioid abuse-misuse, Death, Diversion Discussed. Overdose reversal drug Naloxone discussed.  3.Refill/Continue medications for pain control and function       Requested Prescriptions     Signed Prescriptions Disp Refills    meloxicam (MOBIC) 7.5 MG tablet 30 tablet 2     Sig: Take 1 tablet (7.5 mg total) by mouth once daily.    methocarbamoL (ROBAXIN) 500 MG Tab 90 tablet 2     Sig: Take 1 tablet (500 mg total) by mouth 3 (three) times daily.     4. No further opiates due to illicit substances detected on definitive oral drug screen     5.Follow with [facility] in 3 months for re-evaluation and medication refill       report:  Reviewed and consistent with medication use as prescribed.      The total time spent for evaluation and management on 11/07/2022 including reviewing separately obtained history, performing a medically appropriate exam and evaluation, documenting clinical information in the health record, independently interpreting results and communicating them to the patient/family/caregiver, and ordering medications/tests/procedures was between 15-29 minutes.    The above plan and management options were discussed at length with patient. Patient is in agreement with the above and verbalized understanding. It will be communicated with the referring physician via electronic record, fax, or mail.

## 2022-11-09 DIAGNOSIS — Z71.89 COMPLEX CARE COORDINATION: ICD-10-CM

## 2023-01-12 DIAGNOSIS — I10 HYPERTENSION, UNSPECIFIED TYPE: ICD-10-CM

## 2023-01-12 DIAGNOSIS — K59.03 DRUG-INDUCED CONSTIPATION: ICD-10-CM

## 2023-01-12 DIAGNOSIS — E78.5 HYPERLIPIDEMIA, UNSPECIFIED HYPERLIPIDEMIA TYPE: ICD-10-CM

## 2023-01-12 RX ORDER — SIMVASTATIN 80 MG/1
80 TABLET, FILM COATED ORAL NIGHTLY
Qty: 90 TABLET | Refills: 3 | Status: SHIPPED | OUTPATIENT
Start: 2023-01-12 | End: 2023-10-17 | Stop reason: SDUPTHER

## 2023-01-26 ENCOUNTER — OFFICE VISIT (OUTPATIENT)
Dept: FAMILY MEDICINE | Facility: CLINIC | Age: 88
End: 2023-01-26
Payer: MEDICARE

## 2023-01-26 VITALS
HEART RATE: 78 BPM | HEIGHT: 71 IN | SYSTOLIC BLOOD PRESSURE: 103 MMHG | BODY MASS INDEX: 20.27 KG/M2 | OXYGEN SATURATION: 92 % | DIASTOLIC BLOOD PRESSURE: 63 MMHG | TEMPERATURE: 98 F | WEIGHT: 144.81 LBS

## 2023-01-26 DIAGNOSIS — K59.03 DRUG-INDUCED CONSTIPATION: Primary | ICD-10-CM

## 2023-01-26 DIAGNOSIS — I10 PRIMARY HYPERTENSION: ICD-10-CM

## 2023-01-26 DIAGNOSIS — Z23 IMMUNIZATION DUE: ICD-10-CM

## 2023-01-26 PROCEDURE — 1126F AMNT PAIN NOTED NONE PRSNT: CPT | Mod: ,,, | Performed by: FAMILY MEDICINE

## 2023-01-26 PROCEDURE — 1160F RVW MEDS BY RX/DR IN RCRD: CPT | Mod: ,,, | Performed by: FAMILY MEDICINE

## 2023-01-26 PROCEDURE — 1101F PT FALLS ASSESS-DOCD LE1/YR: CPT | Mod: ,,, | Performed by: FAMILY MEDICINE

## 2023-01-26 PROCEDURE — 1159F MED LIST DOCD IN RCRD: CPT | Mod: ,,, | Performed by: FAMILY MEDICINE

## 2023-01-26 PROCEDURE — 1126F PR PAIN SEVERITY QUANTIFIED, NO PAIN PRESENT: ICD-10-PCS | Mod: ,,, | Performed by: FAMILY MEDICINE

## 2023-01-26 PROCEDURE — 1160F PR REVIEW ALL MEDS BY PRESCRIBER/CLIN PHARMACIST DOCUMENTED: ICD-10-PCS | Mod: ,,, | Performed by: FAMILY MEDICINE

## 2023-01-26 PROCEDURE — 1159F PR MEDICATION LIST DOCUMENTED IN MEDICAL RECORD: ICD-10-PCS | Mod: ,,, | Performed by: FAMILY MEDICINE

## 2023-01-26 PROCEDURE — 99213 OFFICE O/P EST LOW 20 MIN: CPT | Mod: ,,, | Performed by: FAMILY MEDICINE

## 2023-01-26 PROCEDURE — 3288F FALL RISK ASSESSMENT DOCD: CPT | Mod: ,,, | Performed by: FAMILY MEDICINE

## 2023-01-26 PROCEDURE — 99213 PR OFFICE/OUTPT VISIT, EST, LEVL III, 20-29 MIN: ICD-10-PCS | Mod: ,,, | Performed by: FAMILY MEDICINE

## 2023-01-26 PROCEDURE — 3288F PR FALLS RISK ASSESSMENT DOCUMENTED: ICD-10-PCS | Mod: ,,, | Performed by: FAMILY MEDICINE

## 2023-01-26 PROCEDURE — 1101F PR PT FALLS ASSESS DOC 0-1 FALLS W/OUT INJ PAST YR: ICD-10-PCS | Mod: ,,, | Performed by: FAMILY MEDICINE

## 2023-01-26 RX ORDER — MULTIVITAMIN
TABLET ORAL
COMMUNITY

## 2023-01-26 RX ORDER — METHYLNALTREXONE BROMIDE 150 MG/1
450 TABLET ORAL EVERY MORNING
Qty: 90 TABLET | Refills: 0 | Status: SHIPPED | OUTPATIENT
Start: 2023-01-26

## 2023-01-26 RX ORDER — HYDROCODONE BITARTRATE AND ACETAMINOPHEN 10; 325 MG/1; MG/1
1 TABLET ORAL 2 TIMES DAILY PRN
COMMUNITY
Start: 2023-01-11

## 2023-01-26 RX ORDER — FERROUS SULFATE 325(65) MG
TABLET ORAL
COMMUNITY
End: 2023-05-04 | Stop reason: SDUPTHER

## 2023-01-26 RX ORDER — NIFEDIPINE 30 MG/1
TABLET, FILM COATED, EXTENDED RELEASE ORAL
COMMUNITY

## 2023-01-26 RX ORDER — OMEGA-3/DHA/EPA/FISH OIL 300-1000MG
1 CAPSULE,DELAYED RELEASE (ENTERIC COATED) ORAL
COMMUNITY

## 2023-01-26 RX ORDER — TERAZOSIN 2 MG/1
1 CAPSULE ORAL
COMMUNITY

## 2023-01-26 NOTE — PROGRESS NOTES
Subjective:       Patient ID: Coleman Lares is a 89 y.o. male.    Chief Complaint: Follow-up (HTN AND HLD (DID NOT BRING MEDS TO REVIEW TODAY))    Pt presents today for FU for constipation. Says he is still only having 1 bowel movement a week. Denies abdominal pain, loss of appetite or sudden weight loss. Denies blood in stool. Says BP still well controlled at home, and is checked daily. Pt taking combination of docusate and miralax. Is agreeable to trying something else.. Denies hx of bowel obstruction.         Current Outpatient Medications:     acetaminophen (TYLENOL) 325 MG tablet, Take 325 mg by mouth every 6 (six) hours as needed for Pain., Disp: , Rfl:     albuterol (PROVENTIL/VENTOLIN HFA) 90 mcg/actuation inhaler, Inhale 2 puffs into the lungs., Disp: , Rfl:     aspirin (ECOTRIN) 81 MG EC tablet, Take 1 tablet (81 mg total) by mouth once daily., Disp: 30 tablet, Rfl: 5    carvediloL (COREG) 6.25 MG tablet, Take 1 tablet (6.25 mg total) by mouth once daily., Disp: 90 tablet, Rfl: 2    cyanocobalamin, vitamin B-12, 1,000 mcg Cap, , Disp: , Rfl:     docusate sodium (COLACE) 250 MG capsule, Take 1 capsule (250 mg total) by mouth once daily., Disp: 30 capsule, Rfl: 3    ergocalciferol (ERGOCALCIFEROL) 50,000 unit Cap, Take 50,000 Units by mouth every 7 days., Disp: , Rfl:     ergocalciferol, vitamin D2, (VITAMIN D2 ORAL), 1 tablet., Disp: , Rfl:     ferrous sulfate (FEOSOL) 325 mg (65 mg iron) Tab tablet, Take by mouth., Disp: , Rfl:     ferrous sulfate (FEOSOL) 325 mg (65 mg iron) Tab tablet, as directed, Disp: , Rfl:     HYDROcodone-acetaminophen (NORCO)  mg per tablet, Take 1 tablet by mouth 2 (two) times daily as needed., Disp: , Rfl:     ipratropium-albuteroL (COMBIVENT RESPIMAT)  mcg/actuation inhaler, Inhale 1 puff into the lungs., Disp: , Rfl:     linaCLOtide (LINZESS) 290 mcg Cap capsule, Take 1 capsule (290 mcg total) by mouth before breakfast., Disp: 30 capsule, Rfl: 3    losartan  (COZAAR) 50 MG tablet, Take 1 tablet (50 mg total) by mouth once daily., Disp: 90 tablet, Rfl: 2    magnesium hydroxide (DE LEON CHEWS) 311 MG Chew, Take 1 tablet (311 mg total) by mouth 2 (two) times daily as needed (for severe constipation)., Disp: 120 tablet, Rfl: 0    meloxicam (MOBIC) 7.5 MG tablet, Take 1 tablet (7.5 mg total) by mouth once daily., Disp: 30 tablet, Rfl: 2    methocarbamoL (ROBAXIN) 500 MG Tab, Take 1 tablet (500 mg total) by mouth 3 (three) times daily., Disp: 90 tablet, Rfl: 2    methylnaltrexone (RELISTOR) 150 mg Tab, Take 450 mg by mouth every morning., Disp: 90 tablet, Rfl: 0    multivitamin (THERAGRAN) per tablet, , Disp: , Rfl:     NIFEdipine (ADALAT CC) 30 MG TbSR, as directed, Disp: , Rfl:     NIFEdipine (PROCARDIA-XL) 30 MG (OSM) 24 hr tablet, Take 1 tablet (30 mg total) by mouth once daily., Disp: 90 tablet, Rfl: 2    omega 3-dha-epa-fish oil 300-1,000 mg CpDR capsule, 1 capsule., Disp: , Rfl:     polyethylene glycol (GLYCOLAX) 17 gram PwPk, Take 17 g by mouth once daily., Disp: 30 each, Rfl: 3    simvastatin (ZOCOR) 80 MG tablet, Take 1 tablet (80 mg total) by mouth every evening. Take 1/2 tablet once at night., Disp: 90 tablet, Rfl: 3    tamsulosin (FLOMAX) 0.4 mg Cap, Take 1 capsule (0.4 mg total) by mouth once daily., Disp: 90 capsule, Rfl: 2    terazosin (HYTRIN) 2 MG capsule, 1 capsule., Disp: , Rfl:     traZODone (DESYREL) 50 MG tablet, Take 1 tablet (50 mg total) by mouth every evening., Disp: 30 tablet, Rfl: 1    Review of patient's allergies indicates:  No Known Allergies    Past Medical History:   Diagnosis Date    Aortic regurgitation     Back pain     CAD (coronary artery disease)     Chronic back pain     Constipation     COPD (chronic obstructive pulmonary disease)     Generalized OA     Hyperlipidemia     Hypertension     Osteopenia determined by x-ray     Pulmonary stenosis        Past Surgical History:   Procedure Laterality Date    CORONARY ANGIOPLASTY WITH STENT  PLACEMENT      FOOT SURGERY  2021    Dr Jackson    HIP SURGERY      LITHOTRIPSY         Family History   Problem Relation Age of Onset    Cancer Mother     Cancer Sister     Cancer Brother        Social History     Tobacco Use    Smoking status: Former    Smokeless tobacco: Never   Substance Use Topics    Alcohol use: Never    Drug use: Never       Review of Systems   Constitutional:  Negative for appetite change, fatigue, fever and unexpected weight change.   Respiratory:  Negative for shortness of breath.    Cardiovascular:  Negative for chest pain.   Gastrointestinal:  Positive for constipation. Negative for abdominal pain, blood in stool, change in bowel habit and change in bowel habit.   Genitourinary:  Negative for dysuria, frequency and hematuria.   Musculoskeletal:  Positive for arthralgias and back pain.   Neurological:  Negative for weakness and numbness.   Psychiatric/Behavioral:  Negative for confusion.        Current Medications:   Medication List with Changes/Refills   New Medications    METHYLNALTREXONE (RELISTOR) 150 MG TAB    Take 450 mg by mouth every morning.       Start Date: 1/26/2023 End Date: --   Current Medications    ACETAMINOPHEN (TYLENOL) 325 MG TABLET    Take 325 mg by mouth every 6 (six) hours as needed for Pain.       Start Date: --        End Date: --    ALBUTEROL (PROVENTIL/VENTOLIN HFA) 90 MCG/ACTUATION INHALER    Inhale 2 puffs into the lungs.       Start Date: --        End Date: --    ASPIRIN (ECOTRIN) 81 MG EC TABLET    Take 1 tablet (81 mg total) by mouth once daily.       Start Date: 8/3/2021  End Date: --    CARVEDILOL (COREG) 6.25 MG TABLET    Take 1 tablet (6.25 mg total) by mouth once daily.       Start Date: 7/21/2022 End Date: 4/17/2023    CYANOCOBALAMIN, VITAMIN B-12, 1,000 MCG CAP           Start Date: --        End Date: --    DOCUSATE SODIUM (COLACE) 250 MG CAPSULE    Take 1 capsule (250 mg total) by mouth once daily.       Start Date: 6/20/2022 End Date: --     ERGOCALCIFEROL (ERGOCALCIFEROL) 50,000 UNIT CAP    Take 50,000 Units by mouth every 7 days.       Start Date: --        End Date: --    ERGOCALCIFEROL, VITAMIN D2, (VITAMIN D2 ORAL)    1 tablet.       Start Date: --        End Date: --    FERROUS SULFATE (FEOSOL) 325 MG (65 MG IRON) TAB TABLET    Take by mouth.       Start Date: --        End Date: --    FERROUS SULFATE (FEOSOL) 325 MG (65 MG IRON) TAB TABLET    as directed       Start Date: --        End Date: --    HYDROCODONE-ACETAMINOPHEN (NORCO)  MG PER TABLET    Take 1 tablet by mouth 2 (two) times daily as needed.       Start Date: 1/11/2023 End Date: --    IPRATROPIUM-ALBUTEROL (COMBIVENT RESPIMAT)  MCG/ACTUATION INHALER    Inhale 1 puff into the lungs.       Start Date: --        End Date: --    LINACLOTIDE (LINZESS) 290 MCG CAP CAPSULE    Take 1 capsule (290 mcg total) by mouth before breakfast.       Start Date: 6/20/2022 End Date: --    LOSARTAN (COZAAR) 50 MG TABLET    Take 1 tablet (50 mg total) by mouth once daily.       Start Date: 7/21/2022 End Date: --    MAGNESIUM HYDROXIDE (DE LEON CHEWS) 311 MG CHEW    Take 1 tablet (311 mg total) by mouth 2 (two) times daily as needed (for severe constipation).       Start Date: 10/27/2022End Date: 10/27/2023    MELOXICAM (MOBIC) 7.5 MG TABLET    Take 1 tablet (7.5 mg total) by mouth once daily.       Start Date: 11/7/2022 End Date: --    METHOCARBAMOL (ROBAXIN) 500 MG TAB    Take 1 tablet (500 mg total) by mouth 3 (three) times daily.       Start Date: 11/7/2022 End Date: 2/5/2023    MULTIVITAMIN (THERAGRAN) PER TABLET           Start Date: --        End Date: --    NIFEDIPINE (ADALAT CC) 30 MG TBSR    as directed       Start Date: --        End Date: --    NIFEDIPINE (PROCARDIA-XL) 30 MG (OSM) 24 HR TABLET    Take 1 tablet (30 mg total) by mouth once daily.       Start Date: 7/21/2022 End Date: --    OMEGA 3-DHA-EPA-FISH -1,000 MG CPDR CAPSULE    1 capsule.       Start Date: --        End  "Date: --    POLYETHYLENE GLYCOL (GLYCOLAX) 17 GRAM PWPK    Take 17 g by mouth once daily.       Start Date: 6/20/2022 End Date: --    SIMVASTATIN (ZOCOR) 80 MG TABLET    Take 1 tablet (80 mg total) by mouth every evening. Take 1/2 tablet once at night.       Start Date: 1/12/2023 End Date: --    TAMSULOSIN (FLOMAX) 0.4 MG CAP    Take 1 capsule (0.4 mg total) by mouth once daily.       Start Date: 11/3/2022 End Date: 2/1/2023    TERAZOSIN (HYTRIN) 2 MG CAPSULE    1 capsule.       Start Date: --        End Date: --    TRAZODONE (DESYREL) 50 MG TABLET    Take 1 tablet (50 mg total) by mouth every evening.       Start Date: 7/21/2022 End Date: --            Objective:        Vitals:    01/26/23 0834   BP: 103/63   Pulse: 78   Temp: 97.8 °F (36.6 °C)   TempSrc: Temporal   SpO2: (!) 92%   Weight: 65.7 kg (144 lb 12.8 oz)   Height: 5' 11" (1.803 m)       Physical Exam  Constitutional:       Appearance: Normal appearance.   HENT:      Head: Normocephalic and atraumatic.      Mouth/Throat:      Mouth: Mucous membranes are moist.      Pharynx: Oropharynx is clear.   Eyes:      Extraocular Movements: Extraocular movements intact.   Cardiovascular:      Rate and Rhythm: Normal rate and regular rhythm.   Pulmonary:      Effort: Pulmonary effort is normal.      Breath sounds: Normal breath sounds.   Abdominal:      General: Abdomen is flat. Bowel sounds are normal.      Palpations: Abdomen is soft.   Musculoskeletal:         General: Normal range of motion.   Neurological:      Mental Status: He is alert.           Lab Results   Component Value Date    WBC 7.32 10/27/2022    HGB 11.3 (L) 10/27/2022    HCT 37.5 (L) 10/27/2022     10/27/2022    CHOL 91 02/11/2022    TRIG 122 02/11/2022    HDL 13 (L) 02/11/2022    ALT 20 10/27/2022    AST 8 (L) 10/27/2022     10/27/2022    K 4.7 11/03/2022     (H) 10/27/2022    CREATININE 1.30 10/27/2022    BUN 34 (H) 10/27/2022    CO2 24 10/27/2022    TSH 0.306 (L) 02/10/2022    " INR 1.15 03/23/2020    HGBA1C 5.9 10/27/2022      Assessment:       1. Drug-induced constipation    2. Immunization due    3. Primary hypertension          Plan:         Problem List Items Addressed This Visit          Cardiac/Vascular    Hypertension       GI    Constipation - Primary    Relevant Medications    methylnaltrexone (RELISTOR) 150 mg Tab     Other Visit Diagnoses       Immunization due        Relevant Orders    COVID-19-MRNA-(PF)(Moderna Omicron) Vaccine              Follow up in about 1 week (around 2/2/2023).    Adore Tracy MD     Instructed patient that if symptoms fail to improve or worsen patient should seek immediate medical attention or report to the nearest emergency department. Patient expressed verbal agreement and understanding to this plan of care.

## 2023-02-02 ENCOUNTER — OFFICE VISIT (OUTPATIENT)
Dept: FAMILY MEDICINE | Facility: CLINIC | Age: 88
End: 2023-02-02
Payer: MEDICARE

## 2023-02-02 VITALS
OXYGEN SATURATION: 90 % | TEMPERATURE: 98 F | BODY MASS INDEX: 20.53 KG/M2 | SYSTOLIC BLOOD PRESSURE: 110 MMHG | HEIGHT: 71 IN | DIASTOLIC BLOOD PRESSURE: 66 MMHG | HEART RATE: 60 BPM | WEIGHT: 146.63 LBS

## 2023-02-02 DIAGNOSIS — K59.03 DRUG-INDUCED CONSTIPATION: Primary | ICD-10-CM

## 2023-02-02 PROCEDURE — 1126F AMNT PAIN NOTED NONE PRSNT: CPT | Mod: ,,, | Performed by: FAMILY MEDICINE

## 2023-02-02 PROCEDURE — 1126F PR PAIN SEVERITY QUANTIFIED, NO PAIN PRESENT: ICD-10-PCS | Mod: ,,, | Performed by: FAMILY MEDICINE

## 2023-02-02 PROCEDURE — 1101F PT FALLS ASSESS-DOCD LE1/YR: CPT | Mod: ,,, | Performed by: FAMILY MEDICINE

## 2023-02-02 PROCEDURE — 1159F PR MEDICATION LIST DOCUMENTED IN MEDICAL RECORD: ICD-10-PCS | Mod: ,,, | Performed by: FAMILY MEDICINE

## 2023-02-02 PROCEDURE — 1160F RVW MEDS BY RX/DR IN RCRD: CPT | Mod: ,,, | Performed by: FAMILY MEDICINE

## 2023-02-02 PROCEDURE — 3288F PR FALLS RISK ASSESSMENT DOCUMENTED: ICD-10-PCS | Mod: ,,, | Performed by: FAMILY MEDICINE

## 2023-02-02 PROCEDURE — 99212 PR OFFICE/OUTPT VISIT, EST, LEVL II, 10-19 MIN: ICD-10-PCS | Mod: ,,, | Performed by: FAMILY MEDICINE

## 2023-02-02 PROCEDURE — 99212 OFFICE O/P EST SF 10 MIN: CPT | Mod: ,,, | Performed by: FAMILY MEDICINE

## 2023-02-02 PROCEDURE — 3288F FALL RISK ASSESSMENT DOCD: CPT | Mod: ,,, | Performed by: FAMILY MEDICINE

## 2023-02-02 PROCEDURE — 1160F PR REVIEW ALL MEDS BY PRESCRIBER/CLIN PHARMACIST DOCUMENTED: ICD-10-PCS | Mod: ,,, | Performed by: FAMILY MEDICINE

## 2023-02-02 PROCEDURE — 1159F MED LIST DOCD IN RCRD: CPT | Mod: ,,, | Performed by: FAMILY MEDICINE

## 2023-02-02 PROCEDURE — 1101F PR PT FALLS ASSESS DOC 0-1 FALLS W/OUT INJ PAST YR: ICD-10-PCS | Mod: ,,, | Performed by: FAMILY MEDICINE

## 2023-02-02 RX ORDER — SYRING-NEEDL,DISP,INSUL,0.3 ML 29 G X1/2"
296 SYRINGE, EMPTY DISPOSABLE MISCELLANEOUS WEEKLY
Qty: 1184 ML | Refills: 5 | Status: SHIPPED | OUTPATIENT
Start: 2023-02-02 | End: 2023-05-04

## 2023-02-09 NOTE — PROGRESS NOTES
Subjective:       Patient ID: Coleman Lares is a 89 y.o. male.    Chief Complaint: Follow-up (1 week follow up for constipation and htn )    Patient presented to clinic stating he could not afford the relistor for his constipation. Stated he had been taking all of his fibre supplements and drinking plenty of water daily in addition to his medications for constipation. Agreeable to taking 1/2 bottle of mag citrate twice a week to produce at least 2 bowel movements a week.        Current Outpatient Medications:     acetaminophen (TYLENOL) 325 MG tablet, Take 325 mg by mouth every 6 (six) hours as needed for Pain., Disp: , Rfl:     albuterol (PROVENTIL/VENTOLIN HFA) 90 mcg/actuation inhaler, Inhale 2 puffs into the lungs., Disp: , Rfl:     aspirin (ECOTRIN) 81 MG EC tablet, Take 1 tablet (81 mg total) by mouth once daily., Disp: 30 tablet, Rfl: 5    carvediloL (COREG) 6.25 MG tablet, Take 1 tablet (6.25 mg total) by mouth once daily., Disp: 90 tablet, Rfl: 2    cyanocobalamin, vitamin B-12, 1,000 mcg Cap, , Disp: , Rfl:     docusate sodium (COLACE) 250 MG capsule, Take 1 capsule (250 mg total) by mouth once daily., Disp: 30 capsule, Rfl: 3    ergocalciferol (ERGOCALCIFEROL) 50,000 unit Cap, Take 50,000 Units by mouth every 7 days., Disp: , Rfl:     ergocalciferol, vitamin D2, (VITAMIN D2 ORAL), 1 tablet., Disp: , Rfl:     ferrous sulfate (FEOSOL) 325 mg (65 mg iron) Tab tablet, Take by mouth., Disp: , Rfl:     ferrous sulfate (FEOSOL) 325 mg (65 mg iron) Tab tablet, as directed, Disp: , Rfl:     HYDROcodone-acetaminophen (NORCO)  mg per tablet, Take 1 tablet by mouth 2 (two) times daily as needed., Disp: , Rfl:     ipratropium-albuteroL (COMBIVENT RESPIMAT)  mcg/actuation inhaler, Inhale 1 puff into the lungs., Disp: , Rfl:     linaCLOtide (LINZESS) 290 mcg Cap capsule, Take 1 capsule (290 mcg total) by mouth before breakfast., Disp: 30 capsule, Rfl: 3    losartan (COZAAR) 50 MG tablet, Take 1 tablet (50  mg total) by mouth once daily., Disp: 90 tablet, Rfl: 2    magnesium citrate solution, Take 296 mLs by mouth once a week. for 24 doses, Disp: 1184 mL, Rfl: 5    magnesium hydroxide (DE LEON CHEWS) 311 MG Chew, Take 1 tablet (311 mg total) by mouth 2 (two) times daily as needed (for severe constipation)., Disp: 120 tablet, Rfl: 0    meloxicam (MOBIC) 7.5 MG tablet, Take 1 tablet (7.5 mg total) by mouth once daily., Disp: 30 tablet, Rfl: 2    methylnaltrexone (RELISTOR) 150 mg Tab, Take 450 mg by mouth every morning., Disp: 90 tablet, Rfl: 0    multivitamin (THERAGRAN) per tablet, , Disp: , Rfl:     NIFEdipine (ADALAT CC) 30 MG TbSR, as directed, Disp: , Rfl:     NIFEdipine (PROCARDIA-XL) 30 MG (OSM) 24 hr tablet, Take 1 tablet (30 mg total) by mouth once daily., Disp: 90 tablet, Rfl: 2    omega 3-dha-epa-fish oil 300-1,000 mg CpDR capsule, 1 capsule., Disp: , Rfl:     polyethylene glycol (GLYCOLAX) 17 gram PwPk, Take 17 g by mouth once daily., Disp: 30 each, Rfl: 3    simvastatin (ZOCOR) 80 MG tablet, Take 1 tablet (80 mg total) by mouth every evening. Take 1/2 tablet once at night., Disp: 90 tablet, Rfl: 3    tamsulosin (FLOMAX) 0.4 mg Cap, Take 1 capsule (0.4 mg total) by mouth once daily., Disp: 90 capsule, Rfl: 2    terazosin (HYTRIN) 2 MG capsule, 1 capsule., Disp: , Rfl:     traZODone (DESYREL) 50 MG tablet, Take 1 tablet (50 mg total) by mouth every evening., Disp: 30 tablet, Rfl: 1    Review of patient's allergies indicates:  No Known Allergies    Past Medical History:   Diagnosis Date    Aortic regurgitation     Back pain     CAD (coronary artery disease)     Chronic back pain     Constipation     COPD (chronic obstructive pulmonary disease)     Generalized OA     Hyperlipidemia     Hypertension     Osteopenia determined by x-ray     Pulmonary stenosis        Past Surgical History:   Procedure Laterality Date    CORONARY ANGIOPLASTY WITH STENT PLACEMENT      FOOT SURGERY  2021    Dr Jackson    HIP SURGERY       LITHOTRIPSY         Family History   Problem Relation Age of Onset    Cancer Mother     Cancer Sister     Cancer Brother        Social History     Tobacco Use    Smoking status: Former    Smokeless tobacco: Never   Substance Use Topics    Alcohol use: Never    Drug use: Never       Review of Systems   Constitutional:  Negative for appetite change, fatigue, fever and unexpected weight change.   Respiratory:  Negative for shortness of breath.    Cardiovascular:  Negative for chest pain.   Gastrointestinal:  Positive for constipation. Negative for abdominal pain.   Genitourinary:  Negative for dysuria and frequency.   Neurological:  Negative for weakness and numbness.   Psychiatric/Behavioral:  Negative for confusion.        Current Medications:   Medication List with Changes/Refills   New Medications    MAGNESIUM CITRATE SOLUTION    Take 296 mLs by mouth once a week. for 24 doses       Start Date: 2/2/2023  End Date: 7/14/2023   Current Medications    ACETAMINOPHEN (TYLENOL) 325 MG TABLET    Take 325 mg by mouth every 6 (six) hours as needed for Pain.       Start Date: --        End Date: --    ALBUTEROL (PROVENTIL/VENTOLIN HFA) 90 MCG/ACTUATION INHALER    Inhale 2 puffs into the lungs.       Start Date: --        End Date: --    ASPIRIN (ECOTRIN) 81 MG EC TABLET    Take 1 tablet (81 mg total) by mouth once daily.       Start Date: 8/3/2021  End Date: --    CARVEDILOL (COREG) 6.25 MG TABLET    Take 1 tablet (6.25 mg total) by mouth once daily.       Start Date: 7/21/2022 End Date: 4/17/2023    CYANOCOBALAMIN, VITAMIN B-12, 1,000 MCG CAP           Start Date: --        End Date: --    DOCUSATE SODIUM (COLACE) 250 MG CAPSULE    Take 1 capsule (250 mg total) by mouth once daily.       Start Date: 6/20/2022 End Date: --    ERGOCALCIFEROL (ERGOCALCIFEROL) 50,000 UNIT CAP    Take 50,000 Units by mouth every 7 days.       Start Date: --        End Date: --    ERGOCALCIFEROL, VITAMIN D2, (VITAMIN D2 ORAL)    1 tablet.        Start Date: --        End Date: --    FERROUS SULFATE (FEOSOL) 325 MG (65 MG IRON) TAB TABLET    Take by mouth.       Start Date: --        End Date: --    FERROUS SULFATE (FEOSOL) 325 MG (65 MG IRON) TAB TABLET    as directed       Start Date: --        End Date: --    HYDROCODONE-ACETAMINOPHEN (NORCO)  MG PER TABLET    Take 1 tablet by mouth 2 (two) times daily as needed.       Start Date: 1/11/2023 End Date: --    IPRATROPIUM-ALBUTEROL (COMBIVENT RESPIMAT)  MCG/ACTUATION INHALER    Inhale 1 puff into the lungs.       Start Date: --        End Date: --    LINACLOTIDE (LINZESS) 290 MCG CAP CAPSULE    Take 1 capsule (290 mcg total) by mouth before breakfast.       Start Date: 6/20/2022 End Date: --    LOSARTAN (COZAAR) 50 MG TABLET    Take 1 tablet (50 mg total) by mouth once daily.       Start Date: 7/21/2022 End Date: --    MAGNESIUM HYDROXIDE (DE LEON CHEWS) 311 MG CHEW    Take 1 tablet (311 mg total) by mouth 2 (two) times daily as needed (for severe constipation).       Start Date: 10/27/2022End Date: 10/27/2023    MELOXICAM (MOBIC) 7.5 MG TABLET    Take 1 tablet (7.5 mg total) by mouth once daily.       Start Date: 11/7/2022 End Date: --    METHYLNALTREXONE (RELISTOR) 150 MG TAB    Take 450 mg by mouth every morning.       Start Date: 1/26/2023 End Date: --    MULTIVITAMIN (THERAGRAN) PER TABLET           Start Date: --        End Date: --    NIFEDIPINE (ADALAT CC) 30 MG TBSR    as directed       Start Date: --        End Date: --    NIFEDIPINE (PROCARDIA-XL) 30 MG (OSM) 24 HR TABLET    Take 1 tablet (30 mg total) by mouth once daily.       Start Date: 7/21/2022 End Date: --    OMEGA 3-DHA-EPA-FISH -1,000 MG CPDR CAPSULE    1 capsule.       Start Date: --        End Date: --    POLYETHYLENE GLYCOL (GLYCOLAX) 17 GRAM PWPK    Take 17 g by mouth once daily.       Start Date: 6/20/2022 End Date: --    SIMVASTATIN (ZOCOR) 80 MG TABLET    Take 1 tablet (80 mg total) by mouth every evening. Take  "1/2 tablet once at night.       Start Date: 1/12/2023 End Date: --    TAMSULOSIN (FLOMAX) 0.4 MG CAP    Take 1 capsule (0.4 mg total) by mouth once daily.       Start Date: 11/3/2022 End Date: 2/1/2023    TERAZOSIN (HYTRIN) 2 MG CAPSULE    1 capsule.       Start Date: --        End Date: --    TRAZODONE (DESYREL) 50 MG TABLET    Take 1 tablet (50 mg total) by mouth every evening.       Start Date: 7/21/2022 End Date: --            Objective:        Vitals:    02/02/23 0816   BP: 110/66   Pulse: 60   Temp: 98.4 °F (36.9 °C)   TempSrc: Temporal   SpO2: (!) 90%   Weight: 66.5 kg (146 lb 9.6 oz)   Height: 5' 11" (1.803 m)       Physical Exam  Constitutional:       Appearance: Normal appearance.   HENT:      Head: Normocephalic and atraumatic.      Mouth/Throat:      Mouth: Mucous membranes are moist.      Pharynx: Oropharynx is clear.   Eyes:      Extraocular Movements: Extraocular movements intact.      Conjunctiva/sclera: Conjunctivae normal.      Pupils: Pupils are equal, round, and reactive to light.   Cardiovascular:      Rate and Rhythm: Normal rate and regular rhythm.      Pulses: Normal pulses.      Heart sounds: Normal heart sounds.   Pulmonary:      Effort: Pulmonary effort is normal.      Breath sounds: Normal breath sounds.   Abdominal:      General: Abdomen is flat. Bowel sounds are normal.      Palpations: Abdomen is soft.   Musculoskeletal:         General: Normal range of motion.   Skin:     General: Skin is warm.      Capillary Refill: Capillary refill takes less than 2 seconds.   Neurological:      General: No focal deficit present.      Mental Status: He is alert and oriented to person, place, and time.             Lab Results   Component Value Date    WBC 7.32 10/27/2022    HGB 11.3 (L) 10/27/2022    HCT 37.5 (L) 10/27/2022     10/27/2022    CHOL 91 02/11/2022    TRIG 122 02/11/2022    HDL 13 (L) 02/11/2022    ALT 20 10/27/2022    AST 8 (L) 10/27/2022     10/27/2022    K 4.7 11/03/2022 "     (H) 10/27/2022    CREATININE 1.30 10/27/2022    BUN 34 (H) 10/27/2022    CO2 24 10/27/2022    TSH 0.306 (L) 02/10/2022    INR 1.15 03/23/2020    HGBA1C 5.9 10/27/2022      Assessment:       1. Drug-induced constipation          Plan:         Problem List Items Addressed This Visit          GI    Constipation - Primary    Relevant Medications    magnesium citrate solution         Follow up in about 3 months (around 5/2/2023).    Adore Tracy MD     Instructed patient that if symptoms fail to improve or worsen patient should seek immediate medical attention or report to the nearest emergency department. Patient expressed verbal agreement and understanding to this plan of care.

## 2023-03-12 ENCOUNTER — HOSPITAL ENCOUNTER (EMERGENCY)
Facility: HOSPITAL | Age: 88
Discharge: HOME | End: 2023-03-12
Payer: MEDICARE

## 2023-03-12 VITALS
BODY MASS INDEX: 20.16 KG/M2 | HEART RATE: 75 BPM | HEIGHT: 71 IN | RESPIRATION RATE: 18 BRPM | TEMPERATURE: 98 F | DIASTOLIC BLOOD PRESSURE: 58 MMHG | OXYGEN SATURATION: 97 % | SYSTOLIC BLOOD PRESSURE: 112 MMHG | WEIGHT: 144 LBS

## 2023-03-12 DIAGNOSIS — R11.10 VOMITING, UNSPECIFIED VOMITING TYPE, UNSPECIFIED WHETHER NAUSEA PRESENT: ICD-10-CM

## 2023-03-12 DIAGNOSIS — K52.9 GASTROENTERITIS: Primary | ICD-10-CM

## 2023-03-12 LAB
ANION GAP SERPL CALCULATED.3IONS-SCNC: 12 MMOL/L (ref 7–16)
BASOPHILS # BLD AUTO: 0.02 K/UL (ref 0–0.2)
BASOPHILS NFR BLD AUTO: 0.2 % (ref 0–1)
BUN SERPL-MCNC: 19 MG/DL (ref 7–18)
BUN/CREAT SERPL: 14 (ref 6–20)
CALCIUM SERPL-MCNC: 9.3 MG/DL (ref 8.5–10.1)
CHLORIDE SERPL-SCNC: 106 MMOL/L (ref 98–107)
CO2 SERPL-SCNC: 26 MMOL/L (ref 21–32)
CREAT SERPL-MCNC: 1.38 MG/DL (ref 0.7–1.3)
DIFFERENTIAL METHOD BLD: ABNORMAL
EGFR (NO RACE VARIABLE) (RUSH/TITUS): 49 ML/MIN/1.73M²
EOSINOPHIL # BLD AUTO: 0.13 K/UL (ref 0–0.5)
EOSINOPHIL NFR BLD AUTO: 1.4 % (ref 1–4)
ERYTHROCYTE [DISTWIDTH] IN BLOOD BY AUTOMATED COUNT: 15.5 % (ref 11.5–14.5)
GLUCOSE SERPL-MCNC: 149 MG/DL (ref 74–106)
HCT VFR BLD AUTO: 33.4 % (ref 40–54)
HGB BLD-MCNC: 9.9 G/DL (ref 13.5–18)
IMM GRANULOCYTES # BLD AUTO: 0.04 K/UL (ref 0–0.04)
IMM GRANULOCYTES NFR BLD: 0.4 % (ref 0–0.4)
LYMPHOCYTES # BLD AUTO: 1.05 K/UL (ref 1–4.8)
LYMPHOCYTES NFR BLD AUTO: 11.6 % (ref 27–41)
MCH RBC QN AUTO: 25.6 PG (ref 27–31)
MCHC RBC AUTO-ENTMCNC: 29.6 G/DL (ref 32–36)
MCV RBC AUTO: 86.3 FL (ref 80–96)
MONOCYTES # BLD AUTO: 0.77 K/UL (ref 0–0.8)
MONOCYTES NFR BLD AUTO: 8.5 % (ref 2–6)
MPC BLD CALC-MCNC: 9.8 FL (ref 9.4–12.4)
NEUTROPHILS # BLD AUTO: 7.07 K/UL (ref 1.8–7.7)
NEUTROPHILS NFR BLD AUTO: 77.9 % (ref 53–65)
NRBC # BLD AUTO: 0 X10E3/UL
NRBC, AUTO (.00): 0 %
PLATELET # BLD AUTO: 231 K/UL (ref 150–400)
POTASSIUM SERPL-SCNC: 3.8 MMOL/L (ref 3.5–5.1)
RBC # BLD AUTO: 3.87 M/UL (ref 4.6–6.2)
SODIUM SERPL-SCNC: 140 MMOL/L (ref 136–145)
WBC # BLD AUTO: 9.08 K/UL (ref 4.5–11)

## 2023-03-12 PROCEDURE — 63600175 PHARM REV CODE 636 W HCPCS: Performed by: NURSE PRACTITIONER

## 2023-03-12 PROCEDURE — 96374 THER/PROPH/DIAG INJ IV PUSH: CPT

## 2023-03-12 PROCEDURE — 85025 COMPLETE CBC W/AUTO DIFF WBC: CPT | Performed by: NURSE PRACTITIONER

## 2023-03-12 PROCEDURE — 99284 EMERGENCY DEPT VISIT MOD MDM: CPT | Mod: 25

## 2023-03-12 PROCEDURE — 25000003 PHARM REV CODE 250: Performed by: NURSE PRACTITIONER

## 2023-03-12 PROCEDURE — 99283 PR EMERGENCY DEPT VISIT,LEVEL III: ICD-10-PCS | Mod: ,,, | Performed by: NURSE PRACTITIONER

## 2023-03-12 PROCEDURE — 80048 BASIC METABOLIC PNL TOTAL CA: CPT | Performed by: NURSE PRACTITIONER

## 2023-03-12 PROCEDURE — 99283 EMERGENCY DEPT VISIT LOW MDM: CPT | Mod: ,,, | Performed by: NURSE PRACTITIONER

## 2023-03-12 RX ORDER — ONDANSETRON 2 MG/ML
4 INJECTION INTRAMUSCULAR; INTRAVENOUS
Status: COMPLETED | OUTPATIENT
Start: 2023-03-12 | End: 2023-03-12

## 2023-03-12 RX ORDER — ONDANSETRON 4 MG/1
4 TABLET, ORALLY DISINTEGRATING ORAL EVERY 8 HOURS PRN
Qty: 20 TABLET | Refills: 0 | Status: SHIPPED | OUTPATIENT
Start: 2023-03-12

## 2023-03-12 RX ADMIN — ONDANSETRON 4 MG: 2 INJECTION INTRAMUSCULAR; INTRAVENOUS at 11:03

## 2023-03-12 RX ADMIN — SODIUM CHLORIDE 1000 ML: 9 INJECTION, SOLUTION INTRAVENOUS at 11:03

## 2023-03-12 NOTE — ED PROVIDER NOTES
Encounter Date: 3/12/2023       History     Chief Complaint   Patient presents with    Vomiting    Diarrhea     89 y/o male presnets to the ED for vomiting.  Reports it began around 2100 last PM, vomited total of about 4 times with last being around 0400 this morning.  He feels a little weak,  denies Abd pain, no diarrhea that her reports.  Usually has problems with constipation.  Denies sick contacts that he is aware, no fever, no dysuria reported.    Review of patient's allergies indicates:  No Known Allergies  Past Medical History:   Diagnosis Date    Aortic regurgitation     Back pain     CAD (coronary artery disease)     Chronic back pain     Constipation     COPD (chronic obstructive pulmonary disease)     Generalized OA     Hyperlipidemia     Hypertension     Osteopenia determined by x-ray     Pulmonary stenosis      Past Surgical History:   Procedure Laterality Date    CORONARY ANGIOPLASTY WITH STENT PLACEMENT      FOOT SURGERY  2021    Dr Jackson    HIP SURGERY      LITHOTRIPSY       Family History   Problem Relation Age of Onset    Cancer Mother     Cancer Sister     Cancer Brother      Social History     Tobacco Use    Smoking status: Former    Smokeless tobacco: Never   Substance Use Topics    Alcohol use: Never    Drug use: Never     Review of Systems   Constitutional:  Negative for fever.   HENT:  Negative for sore throat.    Respiratory:  Negative for shortness of breath.    Cardiovascular:  Negative for chest pain.   Gastrointestinal:  Positive for nausea and vomiting.   Genitourinary:  Negative for dysuria.   Musculoskeletal:  Negative for back pain.   Skin:  Negative for rash.   Neurological:  Negative for weakness.   Hematological:  Does not bruise/bleed easily.   All other systems reviewed and are negative.    Physical Exam     Initial Vitals [03/12/23 1047]   BP Pulse Resp Temp SpO2   (!) 99/54 83 16 98.1 °F (36.7 °C) 97 %      MAP       --         Physical Exam    Constitutional: He appears  well-developed and well-nourished. He appears ill.   HENT:   Head: Normocephalic.   Eyes: EOM are normal. Pupils are equal, round, and reactive to light.   Neck: Neck supple.   Cardiovascular:  Normal rate, regular rhythm, normal heart sounds and intact distal pulses.           Pulmonary/Chest: Breath sounds normal.   Abdominal: Abdomen is soft. Bowel sounds are normal.   Musculoskeletal:         General: Normal range of motion.      Cervical back: Neck supple.     Neurological: He is alert and oriented to person, place, and time. He has normal strength. No cranial nerve deficit or sensory deficit. GCS score is 15. GCS eye subscore is 4. GCS verbal subscore is 5. GCS motor subscore is 6.   Skin: Skin is warm and dry. Capillary refill takes less than 2 seconds.   Psychiatric: He has a normal mood and affect. His behavior is normal. Thought content normal.       Medical Screening Exam   See Full Note    ED Course   Procedures  Labs Reviewed   BASIC METABOLIC PANEL - Abnormal; Notable for the following components:       Result Value    Glucose 149 (*)     BUN 19 (*)     Creatinine 1.38 (*)     eGFR 49 (*)     All other components within normal limits   CBC WITH DIFFERENTIAL - Abnormal; Notable for the following components:    RBC 3.87 (*)     Hemoglobin 9.9 (*)     Hematocrit 33.4 (*)     MCH 25.6 (*)     MCHC 29.6 (*)     RDW 15.5 (*)     Neutrophils % 77.9 (*)     Lymphocytes % 11.6 (*)     Monocytes % 8.5 (*)     All other components within normal limits   CBC W/ AUTO DIFFERENTIAL    Narrative:     The following orders were created for panel order CBC auto differential.  Procedure                               Abnormality         Status                     ---------                               -----------         ------                     CBC with Differential[[id number]]        Abnormal            Final result                 Please view results for these tests on the individual orders.          Imaging Results     None          Medications   sodium chloride 0.9% bolus 1,000 mL 1,000 mL (0 mLs Intravenous Stopped 3/12/23 1157)   ondansetron injection 4 mg (4 mg Intravenous Given 3/12/23 1130)     Medical Decision Making:   Initial Assessment:   89 y/o male presnets to the ED for vomiting.  Reports it began around 2100 last PM, vomited total of about 4 times with last being around 0400 this morning.  He feels a little weak,  denies Abd pain, no diarrhea that her reports.  Usually has problems with constipation.  Denies sick contacts that he is aware, no fever, no dysuria reported.    Differential Diagnosis:   Gastroenteritis  Vomiting  hypotension  Clinical Tests:   Lab Tests: Reviewed  The following lab test(s) were unremarkable: CBC, BMP and Urinalysis  ED Management:  IVF and zofran given in ED.  With improved BP and tolerated PO challenge.  He desires to go home, will d/c with zofran and have followup with primary care this week  MDM    Patient presents for emergent evaluation of acute vomiting that poses a threat to life and/or bodily function.    In the ED patient found to have acute vomiting.    I ordered labs and personally reviewed them.  Labs significant for dehydration    Discharge [facility]  Patient was managed in the ED with IV fluids and zofran.    The response to treatment was good.    Patient was discharged in stable condition.  Detailed return precautions discussed.                  Clinical Impression:   Final diagnoses:  [K52.9] Gastroenteritis (Primary)  [R11.10] Vomiting, unspecified vomiting type, unspecified whether nausea present        ED Disposition Condition    Discharge Stable          ED Prescriptions       Medication Sig Dispense Start Date End Date Auth. Provider    ondansetron (ZOFRAN-ODT) 4 MG TbDL Take 1 tablet (4 mg total) by mouth every 8 (eight) hours as needed (nausea). 20 tablet 3/12/2023 -- LEXIE Fontenot          Follow-up Information    None          LEXIE Fontenot  03/12/23  [id number]

## 2023-03-12 NOTE — DISCHARGE INSTRUCTIONS
Clear liquids for 24 hours  Zofran as directed as needed for nausea and vomiting  Followup with primary care this week

## 2023-03-30 ENCOUNTER — HOSPITAL ENCOUNTER (EMERGENCY)
Facility: HOSPITAL | Age: 88
Discharge: HOME | End: 2023-03-30
Payer: MEDICARE

## 2023-03-30 VITALS
RESPIRATION RATE: 16 BRPM | HEIGHT: 71 IN | HEART RATE: 85 BPM | SYSTOLIC BLOOD PRESSURE: 111 MMHG | TEMPERATURE: 99 F | BODY MASS INDEX: 19.32 KG/M2 | OXYGEN SATURATION: 95 % | DIASTOLIC BLOOD PRESSURE: 70 MMHG | WEIGHT: 138 LBS

## 2023-03-30 DIAGNOSIS — N39.0 URINARY TRACT INFECTION WITHOUT HEMATURIA, SITE UNSPECIFIED: Primary | ICD-10-CM

## 2023-03-30 DIAGNOSIS — E86.0 DEHYDRATION: ICD-10-CM

## 2023-03-30 DIAGNOSIS — K52.9 GASTROENTERITIS: ICD-10-CM

## 2023-03-30 LAB
ACANTHOCYTES BLD QL SMEAR: ABNORMAL
ALBUMIN SERPL BCP-MCNC: 3.1 G/DL (ref 3.5–5)
ALBUMIN/GLOB SERPL: 0.8 {RATIO}
ALP SERPL-CCNC: 75 U/L (ref 45–115)
ALT SERPL W P-5'-P-CCNC: 13 U/L (ref 16–61)
ANION GAP SERPL CALCULATED.3IONS-SCNC: 12 MMOL/L (ref 7–16)
ANISOCYTOSIS BLD QL SMEAR: ABNORMAL
AST SERPL W P-5'-P-CCNC: 9 U/L (ref 15–37)
BACTERIA #/AREA URNS HPF: ABNORMAL /HPF
BASOPHILS # BLD AUTO: 0.01 K/UL (ref 0–0.2)
BASOPHILS NFR BLD AUTO: 0.2 % (ref 0–1)
BILIRUB SERPL-MCNC: 0.4 MG/DL (ref ?–1.2)
BILIRUB UR QL STRIP: NEGATIVE
BUN SERPL-MCNC: 23 MG/DL (ref 7–18)
BUN/CREAT SERPL: 17 (ref 6–20)
CALCIUM SERPL-MCNC: 9.1 MG/DL (ref 8.5–10.1)
CHLORIDE SERPL-SCNC: 110 MMOL/L (ref 98–107)
CLARITY UR: CLEAR
CO2 SERPL-SCNC: 23 MMOL/L (ref 21–32)
COLOR UR: ABNORMAL
CREAT SERPL-MCNC: 1.36 MG/DL (ref 0.7–1.3)
CRENATED CELLS: ABNORMAL
DIFFERENTIAL METHOD BLD: ABNORMAL
EGFR (NO RACE VARIABLE) (RUSH/TITUS): 49 ML/MIN/1.73M²
EOSINOPHIL # BLD AUTO: 0 K/UL (ref 0–0.5)
EOSINOPHIL NFR BLD AUTO: 0 % (ref 1–4)
ERYTHROCYTE [DISTWIDTH] IN BLOOD BY AUTOMATED COUNT: 15 % (ref 11.5–14.5)
GLOBULIN SER-MCNC: 3.8 G/DL (ref 2–4)
GLUCOSE SERPL-MCNC: 135 MG/DL (ref 74–106)
GLUCOSE UR STRIP-MCNC: NORMAL MG/DL
HCT VFR BLD AUTO: 33.8 % (ref 40–54)
HGB BLD-MCNC: 10 G/DL (ref 13.5–18)
HYPOCHROMIA BLD QL SMEAR: ABNORMAL
IMM GRANULOCYTES # BLD AUTO: 0.03 K/UL (ref 0–0.04)
IMM GRANULOCYTES NFR BLD: 0.5 % (ref 0–0.4)
KETONES UR STRIP-SCNC: NEGATIVE MG/DL
LEUKOCYTE ESTERASE UR QL STRIP: ABNORMAL
LYMPHOCYTES # BLD AUTO: 0.62 K/UL (ref 1–4.8)
LYMPHOCYTES NFR BLD AUTO: 9.7 % (ref 27–41)
LYMPHOCYTES NFR BLD MANUAL: 8 % (ref 27–41)
MAGNESIUM SERPL-MCNC: 2.1 MG/DL (ref 1.7–2.3)
MCH RBC QN AUTO: 25.3 PG (ref 27–31)
MCHC RBC AUTO-ENTMCNC: 29.6 G/DL (ref 32–36)
MCV RBC AUTO: 85.6 FL (ref 80–96)
MONOCYTES # BLD AUTO: 0.32 K/UL (ref 0–0.8)
MONOCYTES NFR BLD AUTO: 5 % (ref 2–6)
MONOCYTES NFR BLD MANUAL: 3 % (ref 2–6)
MPC BLD CALC-MCNC: 8.8 FL (ref 9.4–12.4)
MUCOUS THREADS #/AREA URNS HPF: ABNORMAL /HPF
NEUTROPHILS # BLD AUTO: 5.39 K/UL (ref 1.8–7.7)
NEUTROPHILS NFR BLD AUTO: 84.6 % (ref 53–65)
NEUTS SEG NFR BLD MANUAL: 89 % (ref 50–62)
NITRITE UR QL STRIP: POSITIVE
NRBC # BLD AUTO: 0 X10E3/UL
NRBC, AUTO (.00): 0 %
PH UR STRIP: 5.5 PH UNITS
PLATELET # BLD AUTO: 356 K/UL (ref 150–400)
PLATELET MORPHOLOGY: ABNORMAL
POTASSIUM SERPL-SCNC: 3.6 MMOL/L (ref 3.5–5.1)
PROT SERPL-MCNC: 6.9 G/DL (ref 6.4–8.2)
PROT UR QL STRIP: 10
RBC # BLD AUTO: 3.95 M/UL (ref 4.6–6.2)
RBC # UR STRIP: NEGATIVE /UL
RBC #/AREA URNS HPF: ABNORMAL /HPF
SODIUM SERPL-SCNC: 141 MMOL/L (ref 136–145)
SP GR UR STRIP: 1.01
SQUAMOUS #/AREA URNS LPF: ABNORMAL /LPF
TRANS CELLS #/AREA URNS LPF: ABNORMAL /LPF
TRICHOMONAS #/AREA URNS HPF: ABNORMAL /HPF
UROBILINOGEN UR STRIP-ACNC: NORMAL MG/DL
WBC # BLD AUTO: 6.37 K/UL (ref 4.5–11)
WBC #/AREA URNS HPF: ABNORMAL /HPF
YEAST #/AREA URNS HPF: ABNORMAL /HPF

## 2023-03-30 PROCEDURE — 85025 COMPLETE CBC W/AUTO DIFF WBC: CPT | Performed by: NURSE PRACTITIONER

## 2023-03-30 PROCEDURE — 96365 THER/PROPH/DIAG IV INF INIT: CPT

## 2023-03-30 PROCEDURE — 63600175 PHARM REV CODE 636 W HCPCS: Performed by: NURSE PRACTITIONER

## 2023-03-30 PROCEDURE — 96361 HYDRATE IV INFUSION ADD-ON: CPT

## 2023-03-30 PROCEDURE — 99284 EMERGENCY DEPT VISIT MOD MDM: CPT | Mod: ,,, | Performed by: NURSE PRACTITIONER

## 2023-03-30 PROCEDURE — 87086 URINE CULTURE/COLONY COUNT: CPT | Performed by: NURSE PRACTITIONER

## 2023-03-30 PROCEDURE — 99284 EMERGENCY DEPT VISIT MOD MDM: CPT | Mod: 25

## 2023-03-30 PROCEDURE — 99284 PR EMERGENCY DEPT VISIT,LEVEL IV: ICD-10-PCS | Mod: ,,, | Performed by: NURSE PRACTITIONER

## 2023-03-30 PROCEDURE — 87077 CULTURE AEROBIC IDENTIFY: CPT | Performed by: NURSE PRACTITIONER

## 2023-03-30 PROCEDURE — 25000003 PHARM REV CODE 250: Performed by: NURSE PRACTITIONER

## 2023-03-30 PROCEDURE — 83735 ASSAY OF MAGNESIUM: CPT | Performed by: NURSE PRACTITIONER

## 2023-03-30 PROCEDURE — 80053 COMPREHEN METABOLIC PANEL: CPT | Performed by: NURSE PRACTITIONER

## 2023-03-30 PROCEDURE — 81001 URINALYSIS AUTO W/SCOPE: CPT | Performed by: NURSE PRACTITIONER

## 2023-03-30 RX ORDER — CEFUROXIME AXETIL 500 MG/1
500 TABLET ORAL EVERY 12 HOURS
Qty: 14 TABLET | Refills: 0 | Status: SHIPPED | OUTPATIENT
Start: 2023-03-30 | End: 2023-04-06

## 2023-03-30 RX ADMIN — SODIUM CHLORIDE 1000 ML: 9 INJECTION, SOLUTION INTRAVENOUS at 03:03

## 2023-03-30 RX ADMIN — DEXTROSE MONOHYDRATE 1 G: 5 INJECTION INTRAVENOUS at 05:03

## 2023-03-30 NOTE — ED PROVIDER NOTES
Encounter Date: 3/30/2023       History     Chief Complaint   Patient presents with    Nausea     89 year old male presents to ED with complaint of nausea/vomiting. Patient states he ate barbeque chicken and ribs on Monday and everyone that ate it got sick. Patient endorses nausea/vomiting with use of PRN Zofran on today that helped with nausea. Family at bedside states patient was seen approximately 2 weeks ago for the same complaint and was diagnosed with gastroenteritis and prescribed Zofran for nausea/vomiting. Family endorses decreased appetite and poor intake of food/fluid. Patient denies abdominal pain, chest pain, fever, chills, shortness of breath.     The history is provided by the patient, medical records and a relative.   Nausea  This is a recurrent problem. The current episode started more than 2 days ago. The problem occurs daily. Progression since onset: waxing/waning. Pertinent negatives include no chest pain, no abdominal pain and no shortness of breath. Nothing aggravates the symptoms. Relieved by: anitemetics.   Review of patient's allergies indicates:  No Known Allergies  Past Medical History:   Diagnosis Date    Aortic regurgitation     Back pain     CAD (coronary artery disease)     Chronic back pain     Constipation     COPD (chronic obstructive pulmonary disease)     Generalized OA     Hyperlipidemia     Hypertension     Osteopenia determined by x-ray     Pulmonary stenosis      Past Surgical History:   Procedure Laterality Date    CORONARY ANGIOPLASTY WITH STENT PLACEMENT      FOOT SURGERY  2021    Dr Jackson    HIP SURGERY      LITHOTRIPSY       Family History   Problem Relation Age of Onset    Cancer Mother     Cancer Sister     Cancer Brother      Social History     Tobacco Use    Smoking status: Former    Smokeless tobacco: Never   Substance Use Topics    Alcohol use: Never    Drug use: Never     Review of Systems   Constitutional:  Negative for chills and fever.   HENT:  Negative for  congestion, sinus pressure and sinus pain.    Eyes:  Negative for photophobia and visual disturbance.   Respiratory:  Negative for cough and shortness of breath.    Cardiovascular:  Negative for chest pain and palpitations.   Gastrointestinal:  Positive for nausea and vomiting. Negative for abdominal pain and diarrhea.   Endocrine: Negative for cold intolerance and heat intolerance.   Genitourinary:  Positive for decreased urine volume. Negative for difficulty urinating.   Musculoskeletal:  Negative for arthralgias and gait problem.   Skin:  Negative for color change and wound.   Allergic/Immunologic: Negative for environmental allergies and food allergies.   Neurological:  Positive for weakness. Negative for dizziness.   Hematological:  Negative for adenopathy. Does not bruise/bleed easily.   Psychiatric/Behavioral:  Negative for agitation and confusion.    All other systems reviewed and are negative.    Physical Exam     Initial Vitals [03/30/23 1432]   BP Pulse Resp Temp SpO2   111/70 85 16 98.5 °F (36.9 °C) 95 %      MAP       --         Physical Exam    Nursing note and vitals reviewed.  Constitutional: He appears well-developed and well-nourished.   HENT:   Head: Normocephalic and atraumatic.   Eyes: EOM are normal. Pupils are equal, round, and reactive to light.   Neck: Neck supple.   Normal range of motion.  Cardiovascular:  Normal rate and regular rhythm.           Pulmonary/Chest: He has no wheezes. He has no rhonchi.   Abdominal: Abdomen is soft. He exhibits no distension. There is no abdominal tenderness.   Musculoskeletal:         General: No tenderness or edema.      Cervical back: Normal range of motion and neck supple.     Neurological: No cranial nerve deficit or sensory deficit.   Skin: Skin is warm and dry. Capillary refill takes less than 2 seconds.   Psychiatric: He has a normal mood and affect. Thought content normal.       Medical Screening Exam   See Full Note    ED Course   Procedures  Labs  Reviewed   CULTURE, URINE - Abnormal; Notable for the following components:       Result Value    Culture, Urine >100,000 Escherichia coli (*)     All other components within normal limits   COMPREHENSIVE METABOLIC PANEL - Abnormal; Notable for the following components:    Chloride 110 (*)     Glucose 135 (*)     BUN 23 (*)     Creatinine 1.36 (*)     Albumin 3.1 (*)     ALT 13 (*)     AST 9 (*)     eGFR 49 (*)     All other components within normal limits   URINALYSIS, REFLEX TO URINE CULTURE - Abnormal; Notable for the following components:    Leukocytes, UA Large (*)     Nitrites, UA Positive (*)     Protein, UA 10 (*)     All other components within normal limits   CBC WITH DIFFERENTIAL - Abnormal; Notable for the following components:    RBC 3.95 (*)     Hemoglobin 10.0 (*)     Hematocrit 33.8 (*)     MCH 25.3 (*)     MCHC 29.6 (*)     RDW 15.0 (*)     MPV 8.8 (*)     Neutrophils % 84.6 (*)     Lymphocytes % 9.7 (*)     Eosinophils % 0.0 (*)     Immature Granulocytes % 0.5 (*)     Lymphocytes, Absolute 0.62 (*)     All other components within normal limits   MANUAL DIFFERENTIAL - Abnormal; Notable for the following components:    Segmented Neutrophils, Man % 89 (*)     Lymphocytes, Man % 8 (*)     Platelet Morphology Few Large Platelets (*)     All other components within normal limits   URINALYSIS, MICROSCOPIC - Abnormal; Notable for the following components:    WBC, UA 11-15 (*)     Bacteria, UA Loaded (*)     Yeast, UA Rare (*)     Transitional Epithelial Cells, UA Rare (*)     All other components within normal limits   MAGNESIUM - Normal   CBC W/ AUTO DIFFERENTIAL    Narrative:     The following orders were created for panel order CBC auto differential.  Procedure                               Abnormality         Status                     ---------                               -----------         ------                     CBC with Differential[[id number]]        Abnormal            Final result                Manual Differential[[id number]]          Abnormal            Final result                 Please view results for these tests on the individual orders.   EXTRA TUBES    Narrative:     The following orders were created for panel order EXTRA TUBES.  Procedure                               Abnormality         Status                     ---------                               -----------         ------                     Light Blue Top Hold[[id number]]                              In process                 Light Green Top Hold[[id number]]                             In process                   Please view results for these tests on the individual orders.   LIGHT BLUE TOP HOLD   LIGHT GREEN TOP HOLD          Imaging Results              X-Ray KUB (Final result)  Result time 03/30/23 15:37:29      Final result by Simon Virgen II, MD (03/30/23 15:37:29)                   Impression:      No evidence of abnormality demonstrated      Electronically signed by: Simon Virgen  Date:    03/30/2023  Time:    15:37               Narrative:    EXAMINATION:  XR KUB    CLINICAL HISTORY:  Abdominal pain  vomiting;    COMPARISON:  None available    TECHNIQUE:  XR KUB    FINDINGS:  No free fluid or free air seen.  The bowel gas pattern appears within normal limits.  No abnormal calcifications are present.  No other abnormality is identified.                                       Medications   sodium chloride 0.9% bolus 1,000 mL 1,000 mL (0 mLs Intravenous Stopped 3/30/23 1600)   cefTRIAXone (ROCEPHIN) 1 g in dextrose 5 % in water (D5W) 5 % 50 mL IVPB (MB+) (0 g Intravenous Stopped 3/30/23 1825)     Medical Decision Making:   Initial Assessment:   Nausea/vomiting  Differential Diagnosis:   Gastroenteritis  Dehydration  Clinical Tests:   Lab Tests: Ordered and Reviewed  Radiological Study: Ordered and Reviewed  ED Management:  MDM    Patient presents for emergent evaluation of acute nausea/vomiting, decreased appetitie that  poses a threat to life and/or bodily function.    In the ED patient found to have acute UTI, Dehydration, gastroenteritis.    I ordered labs and personally reviewed them.  Labs significant for large leukocytes, positive nitrites, 11-15 WBC, loaded bacteria; BUN/Creat 1.36/23,   I ordered X-rays and personally reviewed them and reviewed the radiologist interpretation.  Xray significant for no evidence of abnormality demonstrated.      Discharge [facility]  Patient was managed in the ED with IV NS, Rocephin.    The response to treatment was good.    Patient was discharged in stable condition.  Detailed return precautions discussed.                   Clinical Impression:   Final diagnoses:  [N39.0] Urinary tract infection without hematuria, site unspecified (Primary)  [E86.0] Dehydration  [K52.9] Gastroenteritis        ED Disposition Condition    Discharge Stable          ED Prescriptions       Medication Sig Dispense Start Date End Date Auth. Provider    cefUROXime (CEFTIN) 500 MG tablet Take 1 tablet (500 mg total) by mouth every 12 (twelve) hours. for 7 days 14 tablet 3/30/2023 4/6/2023 LEXIE Irving          Follow-up Information    None          LEXIE Irving  04/02/23 2501

## 2023-04-01 LAB — UA COMPLETE W REFLEX CULTURE PNL UR: ABNORMAL

## 2023-04-28 ENCOUNTER — PATIENT MESSAGE (OUTPATIENT)
Dept: FAMILY MEDICINE | Facility: CLINIC | Age: 88
End: 2023-04-28
Payer: MEDICARE

## 2023-05-04 ENCOUNTER — OFFICE VISIT (OUTPATIENT)
Dept: FAMILY MEDICINE | Facility: CLINIC | Age: 88
End: 2023-05-04
Payer: MEDICARE

## 2023-05-04 VITALS
HEIGHT: 71 IN | SYSTOLIC BLOOD PRESSURE: 135 MMHG | HEART RATE: 53 BPM | OXYGEN SATURATION: 95 % | TEMPERATURE: 98 F | BODY MASS INDEX: 19.6 KG/M2 | RESPIRATION RATE: 16 BRPM | WEIGHT: 140 LBS | DIASTOLIC BLOOD PRESSURE: 76 MMHG

## 2023-05-04 DIAGNOSIS — K59.00 CONSTIPATION, UNSPECIFIED CONSTIPATION TYPE: ICD-10-CM

## 2023-05-04 LAB
ANION GAP SERPL CALCULATED.3IONS-SCNC: 7 MMOL/L (ref 7–16)
BASOPHILS # BLD AUTO: 0.05 K/UL (ref 0–0.2)
BASOPHILS NFR BLD AUTO: 0.8 % (ref 0–1)
BUN SERPL-MCNC: 13 MG/DL (ref 7–18)
BUN/CREAT SERPL: 11 (ref 6–20)
CALCIUM SERPL-MCNC: 9.5 MG/DL (ref 8.5–10.1)
CHLORIDE SERPL-SCNC: 109 MMOL/L (ref 98–107)
CO2 SERPL-SCNC: 26 MMOL/L (ref 21–32)
CREAT SERPL-MCNC: 1.14 MG/DL (ref 0.7–1.3)
DIFFERENTIAL METHOD BLD: ABNORMAL
EGFR (NO RACE VARIABLE) (RUSH/TITUS): 61 ML/MIN/1.73M2
EOSINOPHIL # BLD AUTO: 0.27 K/UL (ref 0–0.5)
EOSINOPHIL NFR BLD AUTO: 4.2 % (ref 1–4)
ERYTHROCYTE [DISTWIDTH] IN BLOOD BY AUTOMATED COUNT: 16.1 % (ref 11.5–14.5)
GLUCOSE SERPL-MCNC: 118 MG/DL (ref 74–106)
HCT VFR BLD AUTO: 33.5 % (ref 40–54)
HGB BLD-MCNC: 9.5 G/DL (ref 13.5–18)
IMM GRANULOCYTES # BLD AUTO: 0.01 K/UL (ref 0–0.04)
IMM GRANULOCYTES NFR BLD: 0.2 % (ref 0–0.4)
LYMPHOCYTES # BLD AUTO: 1.47 K/UL (ref 1–4.8)
LYMPHOCYTES NFR BLD AUTO: 23 % (ref 27–41)
MCH RBC QN AUTO: 24.5 PG (ref 27–31)
MCHC RBC AUTO-ENTMCNC: 28.4 G/DL (ref 32–36)
MCV RBC AUTO: 86.6 FL (ref 80–96)
MONOCYTES # BLD AUTO: 0.53 K/UL (ref 0–0.8)
MONOCYTES NFR BLD AUTO: 8.3 % (ref 2–6)
MPC BLD CALC-MCNC: 9.6 FL (ref 9.4–12.4)
NEUTROPHILS # BLD AUTO: 4.06 K/UL (ref 1.8–7.7)
NEUTROPHILS NFR BLD AUTO: 63.5 % (ref 53–65)
NRBC # BLD AUTO: 0 X10E3/UL
NRBC, AUTO (.00): 0 %
PLATELET # BLD AUTO: 309 K/UL (ref 150–400)
POTASSIUM SERPL-SCNC: 4.3 MMOL/L (ref 3.5–5.1)
RBC # BLD AUTO: 3.87 M/UL (ref 4.6–6.2)
SODIUM SERPL-SCNC: 138 MMOL/L (ref 136–145)
WBC # BLD AUTO: 6.39 K/UL (ref 4.5–11)

## 2023-05-04 PROCEDURE — 85025 CBC WITH DIFFERENTIAL: ICD-10-PCS | Mod: ,,, | Performed by: CLINICAL MEDICAL LABORATORY

## 2023-05-04 PROCEDURE — 1101F PR PT FALLS ASSESS DOC 0-1 FALLS W/OUT INJ PAST YR: ICD-10-PCS | Mod: ,,, | Performed by: SPECIALIST

## 2023-05-04 PROCEDURE — 80048 BASIC METABOLIC PANEL: ICD-10-PCS | Mod: ,,, | Performed by: CLINICAL MEDICAL LABORATORY

## 2023-05-04 PROCEDURE — 1159F PR MEDICATION LIST DOCUMENTED IN MEDICAL RECORD: ICD-10-PCS | Mod: ,,, | Performed by: SPECIALIST

## 2023-05-04 PROCEDURE — 3288F PR FALLS RISK ASSESSMENT DOCUMENTED: ICD-10-PCS | Mod: ,,, | Performed by: SPECIALIST

## 2023-05-04 PROCEDURE — 1160F RVW MEDS BY RX/DR IN RCRD: CPT | Mod: ,,, | Performed by: SPECIALIST

## 2023-05-04 PROCEDURE — 99213 PR OFFICE/OUTPT VISIT, EST, LEVL III, 20-29 MIN: ICD-10-PCS | Mod: GC,,, | Performed by: SPECIALIST

## 2023-05-04 PROCEDURE — 80048 BASIC METABOLIC PNL TOTAL CA: CPT | Mod: ,,, | Performed by: CLINICAL MEDICAL LABORATORY

## 2023-05-04 PROCEDURE — 1159F MED LIST DOCD IN RCRD: CPT | Mod: ,,, | Performed by: SPECIALIST

## 2023-05-04 PROCEDURE — 1160F PR REVIEW ALL MEDS BY PRESCRIBER/CLIN PHARMACIST DOCUMENTED: ICD-10-PCS | Mod: ,,, | Performed by: SPECIALIST

## 2023-05-04 PROCEDURE — 1101F PT FALLS ASSESS-DOCD LE1/YR: CPT | Mod: ,,, | Performed by: SPECIALIST

## 2023-05-04 PROCEDURE — 85025 COMPLETE CBC W/AUTO DIFF WBC: CPT | Mod: ,,, | Performed by: CLINICAL MEDICAL LABORATORY

## 2023-05-04 PROCEDURE — 3288F FALL RISK ASSESSMENT DOCD: CPT | Mod: ,,, | Performed by: SPECIALIST

## 2023-05-04 PROCEDURE — 99213 OFFICE O/P EST LOW 20 MIN: CPT | Mod: GC,,, | Performed by: SPECIALIST

## 2023-05-04 RX ORDER — DEXTROMETHORPHAN POLISTIREX 30 MG/5 ML
1 SUSPENSION, EXTENDED RELEASE 12 HR ORAL DAILY PRN
Qty: 10 ENEMA | Refills: 0 | Status: SHIPPED | OUTPATIENT
Start: 2023-05-04

## 2023-05-04 RX ORDER — DOCUSATE SODIUM 250 MG
250 CAPSULE ORAL DAILY
Qty: 30 CAPSULE | Refills: 2 | Status: SHIPPED | OUTPATIENT
Start: 2023-05-04 | End: 2023-10-10 | Stop reason: SDUPTHER

## 2023-05-04 RX ORDER — POLYETHYLENE GLYCOL 3350 17 G/17G
17 POWDER, FOR SOLUTION ORAL DAILY
Qty: 30 EACH | Refills: 2 | Status: SHIPPED | OUTPATIENT
Start: 2023-05-04 | End: 2023-05-04

## 2023-05-04 RX ORDER — POLYETHYLENE GLYCOL 3350 17 G/17G
17 POWDER, FOR SOLUTION ORAL 3 TIMES DAILY
Qty: 30 EACH | Refills: 2 | Status: SHIPPED | OUTPATIENT
Start: 2023-05-04

## 2023-05-04 NOTE — PATIENT INSTRUCTIONS
Take daily colace and miralax three times a day for two weeks  Use enema as needed to produce at least one bowel movement every 3 days if miralax and colace does not work  Follow up in two weeks

## 2023-05-04 NOTE — PROGRESS NOTES
Subjective:       Patient ID: Coleman Lares is a 89 y.o. male.    Chief Complaint: Follow-up (Patient reports to the clinic for his 3 month follow up.)    The patient presents to clinic for a fu visit. He was in the ER at the end of March for treatment of a UTI and dehydration. Due to this, the patient was medically unable to travel to see his grandson's graduation in a different state this week. The patient says he is feeling better, but is suffering from severe constipation. He says he has only had one bowel movement in the past month. The patient takes norco so is chronically constipated. He says stool softeners seem to work best for him. He was switched from milk of magnesia back to colace and miralax today with the addition of enemas as needed. He was told if he does not have a bowel movement within two weeks to come to get manually disimpacted if neccessary. His last bowel movement was 4 days ago.      Current Outpatient Medications:     acetaminophen (TYLENOL) 325 MG tablet, Take 325 mg by mouth every 6 (six) hours as needed for Pain., Disp: , Rfl:     albuterol (PROVENTIL/VENTOLIN HFA) 90 mcg/actuation inhaler, Inhale 2 puffs into the lungs., Disp: , Rfl:     aspirin (ECOTRIN) 81 MG EC tablet, Take 1 tablet (81 mg total) by mouth once daily., Disp: 30 tablet, Rfl: 5    carvediloL (COREG) 6.25 MG tablet, Take 1 tablet (6.25 mg total) by mouth once daily., Disp: 90 tablet, Rfl: 2    cyanocobalamin, vitamin B-12, 1,000 mcg Cap, , Disp: , Rfl:     ergocalciferol (ERGOCALCIFEROL) 50,000 unit Cap, Take 50,000 Units by mouth every 7 days., Disp: , Rfl:     ergocalciferol, vitamin D2, (VITAMIN D2 ORAL), 1 tablet., Disp: , Rfl:     ferrous sulfate (FEOSOL) 325 mg (65 mg iron) Tab tablet, Take by mouth., Disp: , Rfl:     HYDROcodone-acetaminophen (NORCO)  mg per tablet, Take 1 tablet by mouth 2 (two) times daily as needed., Disp: , Rfl:     ipratropium-albuteroL (COMBIVENT RESPIMAT)   mcg/actuation inhaler, Inhale 1 puff into the lungs., Disp: , Rfl:     linaCLOtide (LINZESS) 290 mcg Cap capsule, Take 1 capsule (290 mcg total) by mouth before breakfast., Disp: 30 capsule, Rfl: 3    losartan (COZAAR) 50 MG tablet, Take 1 tablet (50 mg total) by mouth once daily., Disp: 90 tablet, Rfl: 2    magnesium hydroxide (DE LEON CHEWS) 311 MG Chew, Take 1 tablet (311 mg total) by mouth 2 (two) times daily as needed (for severe constipation)., Disp: 120 tablet, Rfl: 0    meloxicam (MOBIC) 7.5 MG tablet, Take 1 tablet (7.5 mg total) by mouth once daily., Disp: 30 tablet, Rfl: 2    methylnaltrexone (RELISTOR) 150 mg Tab, Take 450 mg by mouth every morning., Disp: 90 tablet, Rfl: 0    multivitamin (THERAGRAN) per tablet, , Disp: , Rfl:     NIFEdipine (ADALAT CC) 30 MG TbSR, as directed, Disp: , Rfl:     NIFEdipine (PROCARDIA-XL) 30 MG (OSM) 24 hr tablet, Take 1 tablet (30 mg total) by mouth once daily., Disp: 90 tablet, Rfl: 2    omega 3-dha-epa-fish oil 300-1,000 mg CpDR capsule, 1 capsule., Disp: , Rfl:     ondansetron (ZOFRAN-ODT) 4 MG TbDL, Take 1 tablet (4 mg total) by mouth every 8 (eight) hours as needed (nausea)., Disp: 20 tablet, Rfl: 0    simvastatin (ZOCOR) 80 MG tablet, Take 1 tablet (80 mg total) by mouth every evening. Take 1/2 tablet once at night., Disp: 90 tablet, Rfl: 3    tamsulosin (FLOMAX) 0.4 mg Cap, Take 1 capsule (0.4 mg total) by mouth once daily., Disp: 90 capsule, Rfl: 2    terazosin (HYTRIN) 2 MG capsule, 1 capsule., Disp: , Rfl:     traZODone (DESYREL) 50 MG tablet, Take 1 tablet (50 mg total) by mouth every evening., Disp: 30 tablet, Rfl: 1    docusate sodium (COLACE) 250 MG capsule, Take 1 capsule (250 mg total) by mouth once daily., Disp: 30 capsule, Rfl: 2    mineral oil (FLEET OIL RETENTION) enema, Place 133 mLs (1 enema total) rectally daily as needed for Constipation., Disp: 10 enema, Rfl: 0    polyethylene glycol (GLYCOLAX) 17 gram PwPk, Take 17 g by mouth 3  (three) times daily., Disp: 30 each, Rfl: 2    Review of patient's allergies indicates:  No Known Allergies    Past Medical History:   Diagnosis Date    Aortic regurgitation     Back pain     CAD (coronary artery disease)     Chronic back pain     Constipation     COPD (chronic obstructive pulmonary disease)     Generalized OA     Hyperlipidemia     Hypertension     Osteopenia determined by x-ray     Pulmonary stenosis        Past Surgical History:   Procedure Laterality Date    CORONARY ANGIOPLASTY WITH STENT PLACEMENT      FOOT SURGERY  2021    Dr Jackson    HIP SURGERY      LITHOTRIPSY         Family History   Problem Relation Age of Onset    Cancer Mother     Cancer Sister     Cancer Brother        Social History     Tobacco Use    Smoking status: Former    Smokeless tobacco: Never   Substance Use Topics    Alcohol use: Never    Drug use: Never       Review of Systems   Constitutional:  Negative for fatigue and fever.   Respiratory:  Negative for shortness of breath.    Cardiovascular:  Negative for chest pain, palpitations and leg swelling.   Gastrointestinal:  Positive for constipation. Negative for blood in stool, change in bowel habit and change in bowel habit.   Genitourinary:  Negative for dysuria and hematuria.   Neurological:  Negative for headaches.   Psychiatric/Behavioral:  Negative for confusion.        Current Medications:   Medication List with Changes/Refills   New Medications    MINERAL OIL (FLEET OIL RETENTION) ENEMA    Place 133 mLs (1 enema total) rectally daily as needed for Constipation.       Start Date: 5/4/2023  End Date: --   Current Medications    ACETAMINOPHEN (TYLENOL) 325 MG TABLET    Take 325 mg by mouth every 6 (six) hours as needed for Pain.       Start Date: --        End Date: --    ALBUTEROL (PROVENTIL/VENTOLIN HFA) 90 MCG/ACTUATION INHALER    Inhale 2 puffs into the lungs.       Start Date: --        End Date: --    ASPIRIN (ECOTRIN) 81 MG EC TABLET     Take 1 tablet (81 mg total) by mouth once daily.       Start Date: 8/3/2021  End Date: --    CARVEDILOL (COREG) 6.25 MG TABLET    Take 1 tablet (6.25 mg total) by mouth once daily.       Start Date: 7/21/2022 End Date: 5/4/2023    CYANOCOBALAMIN, VITAMIN B-12, 1,000 MCG CAP           Start Date: --        End Date: --    ERGOCALCIFEROL (ERGOCALCIFEROL) 50,000 UNIT CAP    Take 50,000 Units by mouth every 7 days.       Start Date: --        End Date: --    ERGOCALCIFEROL, VITAMIN D2, (VITAMIN D2 ORAL)    1 tablet.       Start Date: --        End Date: --    FERROUS SULFATE (FEOSOL) 325 MG (65 MG IRON) TAB TABLET    Take by mouth.       Start Date: --        End Date: --    HYDROCODONE-ACETAMINOPHEN (NORCO)  MG PER TABLET    Take 1 tablet by mouth 2 (two) times daily as needed.       Start Date: 1/11/2023 End Date: --    IPRATROPIUM-ALBUTEROL (COMBIVENT RESPIMAT)  MCG/ACTUATION INHALER    Inhale 1 puff into the lungs.       Start Date: --        End Date: --    LINACLOTIDE (LINZESS) 290 MCG CAP CAPSULE    Take 1 capsule (290 mcg total) by mouth before breakfast.       Start Date: 6/20/2022 End Date: --    LOSARTAN (COZAAR) 50 MG TABLET    Take 1 tablet (50 mg total) by mouth once daily.       Start Date: 7/21/2022 End Date: --    MAGNESIUM HYDROXIDE (DE LEON CHEWS) 311 MG CHEW    Take 1 tablet (311 mg total) by mouth 2 (two) times daily as needed (for severe constipation).       Start Date: 10/27/2022End Date: 10/27/2023    MELOXICAM (MOBIC) 7.5 MG TABLET    Take 1 tablet (7.5 mg total) by mouth once daily.       Start Date: 11/7/2022 End Date: --    METHYLNALTREXONE (RELISTOR) 150 MG TAB    Take 450 mg by mouth every morning.       Start Date: 1/26/2023 End Date: --    MULTIVITAMIN (THERAGRAN) PER TABLET           Start Date: --        End Date: --    NIFEDIPINE (ADALAT CC) 30 MG TBSR    as directed       Start Date: --        End Date: --    NIFEDIPINE (PROCARDIA-XL) 30 MG (OSM) 24 HR TABLET    Take 1  tablet (30 mg total) by mouth once daily.       Start Date: 7/21/2022 End Date: --    OMEGA 3-DHA-EPA-FISH -1,000 MG CPDR CAPSULE    1 capsule.       Start Date: --        End Date: --    ONDANSETRON (ZOFRAN-ODT) 4 MG TBDL    Take 1 tablet (4 mg total) by mouth every 8 (eight) hours as needed (nausea).       Start Date: 3/12/2023 End Date: --    SIMVASTATIN (ZOCOR) 80 MG TABLET    Take 1 tablet (80 mg total) by mouth every evening. Take 1/2 tablet once at night.       Start Date: 1/12/2023 End Date: --    TAMSULOSIN (FLOMAX) 0.4 MG CAP    Take 1 capsule (0.4 mg total) by mouth once daily.       Start Date: 11/3/2022 End Date: 5/4/2023    TERAZOSIN (HYTRIN) 2 MG CAPSULE    1 capsule.       Start Date: --        End Date: --    TRAZODONE (DESYREL) 50 MG TABLET    Take 1 tablet (50 mg total) by mouth every evening.       Start Date: 7/21/2022 End Date: --   Changed and/or Refilled Medications    Modified Medication Previous Medication    DOCUSATE SODIUM (COLACE) 250 MG CAPSULE docusate sodium (COLACE) 250 MG capsule       Take 1 capsule (250 mg total) by mouth once daily.    Take 1 capsule (250 mg total) by mouth once daily.       Start Date: 5/4/2023  End Date: --    Start Date: 6/20/2022 End Date: 5/4/2023    POLYETHYLENE GLYCOL (GLYCOLAX) 17 GRAM PWPK polyethylene glycol (GLYCOLAX) 17 gram PwPk       Take 17 g by mouth 3 (three) times daily.    Take 17 g by mouth once daily.       Start Date: 5/4/2023  End Date: --    Start Date: 6/20/2022 End Date: 5/4/2023   Discontinued Medications    FERROUS SULFATE (FEOSOL) 325 MG (65 MG IRON) TAB TABLET    as directed       Start Date: --        End Date: 5/4/2023    MAGNESIUM CITRATE SOLUTION    Take 296 mLs by mouth once a week. for 24 doses       Start Date: 2/2/2023  End Date: 5/4/2023            Objective:        Vitals:    05/04/23 0900   BP: 135/76   BP Location: Right arm   Patient Position: Sitting   BP Method: Medium (Automatic)   Pulse: (!) 53   Resp: 16  "  Temp: 98.2 °F (36.8 °C)   TempSrc: Temporal   SpO2: 95%   Weight: 63.5 kg (140 lb)   Height: 5' 11" (1.803 m)       Physical Exam  Constitutional:       Appearance: Normal appearance.   HENT:      Head: Normocephalic and atraumatic.      Mouth/Throat:      Mouth: Mucous membranes are moist.      Pharynx: Oropharynx is clear.   Eyes:      Extraocular Movements: Extraocular movements intact.      Conjunctiva/sclera: Conjunctivae normal.      Pupils: Pupils are equal, round, and reactive to light.   Cardiovascular:      Rate and Rhythm: Normal rate and regular rhythm.      Pulses: Normal pulses.      Heart sounds: Normal heart sounds.   Pulmonary:      Effort: Pulmonary effort is normal.      Breath sounds: Normal breath sounds.   Abdominal:      General: Abdomen is flat. Bowel sounds are normal.      Palpations: Abdomen is soft.   Musculoskeletal:         General: Normal range of motion.   Skin:     General: Skin is warm.      Capillary Refill: Capillary refill takes less than 2 seconds.   Neurological:      General: No focal deficit present.      Mental Status: He is alert and oriented to person, place, and time.           Lab Results   Component Value Date    WBC 6.37 03/30/2023    HGB 10.0 (L) 03/30/2023    HCT 33.8 (L) 03/30/2023     03/30/2023    CHOL 91 02/11/2022    TRIG 122 02/11/2022    HDL 13 (L) 02/11/2022    ALT 13 (L) 03/30/2023    AST 9 (L) 03/30/2023     03/30/2023    K 3.6 03/30/2023     (H) 03/30/2023    CREATININE 1.36 (H) 03/30/2023    BUN 23 (H) 03/30/2023    CO2 23 03/30/2023    TSH 0.306 (L) 02/10/2022    INR 1.15 03/23/2020    HGBA1C 5.9 10/27/2022      Assessment:       1. Constipation, unspecified constipation type        Plan:         Problem List Items Addressed This Visit          GI    Constipation    Relevant Medications    docusate sodium (COLACE) 250 MG capsule    mineral oil (FLEET OIL RETENTION) enema    polyethylene glycol (GLYCOLAX) 17 gram PwPk    Other " Relevant Orders    CBC Auto Differential    Basic Metabolic Panel         Follow up in about 2 weeks (around 5/18/2023).    Adore Trayc MD     Instructed patient that if symptoms fail to improve or worsen patient should seek immediate medical attention or report to the nearest emergency department. Patient expressed verbal agreement and understanding to this plan of care.

## 2023-06-09 DIAGNOSIS — Z71.89 COMPLEX CARE COORDINATION: ICD-10-CM

## 2023-06-30 ENCOUNTER — DOCUMENT SCAN (OUTPATIENT)
Dept: HOME HEALTH SERVICES | Facility: HOSPITAL | Age: 88
End: 2023-06-30
Payer: MEDICARE

## 2023-06-30 ENCOUNTER — HOSPITAL ENCOUNTER (EMERGENCY)
Facility: HOSPITAL | Age: 88
Discharge: HOME | End: 2023-06-30
Payer: MEDICARE

## 2023-06-30 VITALS
RESPIRATION RATE: 16 BRPM | SYSTOLIC BLOOD PRESSURE: 122 MMHG | BODY MASS INDEX: 19.6 KG/M2 | OXYGEN SATURATION: 95 % | HEIGHT: 71 IN | WEIGHT: 140 LBS | DIASTOLIC BLOOD PRESSURE: 68 MMHG | TEMPERATURE: 99 F | HEART RATE: 72 BPM

## 2023-06-30 DIAGNOSIS — I10 HYPERTENSION, UNSPECIFIED TYPE: ICD-10-CM

## 2023-06-30 DIAGNOSIS — K59.03 DRUG-INDUCED CONSTIPATION: ICD-10-CM

## 2023-06-30 DIAGNOSIS — E86.0 DEHYDRATION: Primary | ICD-10-CM

## 2023-06-30 DIAGNOSIS — E78.5 HYPERLIPIDEMIA, UNSPECIFIED HYPERLIPIDEMIA TYPE: ICD-10-CM

## 2023-06-30 PROCEDURE — 99283 PR EMERGENCY DEPT VISIT,LEVEL III: ICD-10-PCS | Mod: ,,, | Performed by: NURSE PRACTITIONER

## 2023-06-30 PROCEDURE — 99283 EMERGENCY DEPT VISIT LOW MDM: CPT | Mod: ,,, | Performed by: NURSE PRACTITIONER

## 2023-06-30 PROCEDURE — 96360 HYDRATION IV INFUSION INIT: CPT

## 2023-06-30 PROCEDURE — 99284 EMERGENCY DEPT VISIT MOD MDM: CPT | Mod: 25

## 2023-06-30 PROCEDURE — 25000003 PHARM REV CODE 250: Performed by: NURSE PRACTITIONER

## 2023-06-30 RX ORDER — LOSARTAN POTASSIUM 50 MG/1
TABLET ORAL
Qty: 90 TABLET | Refills: 0 | Status: SHIPPED | OUTPATIENT
Start: 2023-06-30 | End: 2023-10-20 | Stop reason: SDUPTHER

## 2023-06-30 RX ADMIN — SODIUM CHLORIDE 1000 ML: 9 INJECTION, SOLUTION INTRAVENOUS at 03:06

## 2023-06-30 NOTE — ED NOTES
Called [facility] spoke to Ramila MATIAS.  Pt had order from LASHONDA Dior to get 1L NS for dehydration at home and then d/c IV today.  Pt IV stopped working so he came to ER.

## 2023-06-30 NOTE — ED PROVIDER NOTES
.Encounter Date: 6/30/2023       History     Chief Complaint   Patient presents with    IV Medication     Pt is receiving IV fluids at home.  Pt IV clotted.       89 year old male presents to ED with complaint of IV line malfunction. Patient was seen by PCP in office and was noted to have low blood pressure and dehydration. He was set up to receive IV fluids at home for 1 liter. Patient states the pole/bag tilted over and the line disconnected. They were unable to flush line and was instructed to come to ED for further evaluation. Patient denies chest pain, shortness of breath, weakness, dizziness. Family reports patient does not drink well; only drinking coffee.     The history is provided by the patient and a relative.   Review of patient's allergies indicates:  No Known Allergies  Past Medical History:   Diagnosis Date    Aortic regurgitation     Back pain     CAD (coronary artery disease)     Chronic back pain     Constipation     COPD (chronic obstructive pulmonary disease)     Generalized OA     Hyperlipidemia     Hypertension     Osteopenia determined by x-ray     Pulmonary stenosis      Past Surgical History:   Procedure Laterality Date    CORONARY ANGIOPLASTY WITH STENT PLACEMENT      FOOT SURGERY  2021    Dr Jackson    HIP SURGERY      LITHOTRIPSY       Family History   Problem Relation Age of Onset    Cancer Mother     Cancer Sister     Cancer Brother      Social History     Tobacco Use    Smoking status: Former    Smokeless tobacco: Never   Substance Use Topics    Alcohol use: Never    Drug use: Never     Review of Systems   Constitutional:  Negative for chills and fever.   HENT:  Negative for congestion, sinus pressure and sinus pain.    Respiratory:  Negative for shortness of breath and wheezing.    Cardiovascular:  Negative for chest pain and palpitations.   Gastrointestinal:  Negative for abdominal pain, nausea and vomiting.   Genitourinary:  Positive for decreased urine volume. Negative for  difficulty urinating.   Musculoskeletal:  Negative for arthralgias and gait problem.   Skin:  Negative for color change and wound.   Neurological:  Negative for dizziness and weakness.   All other systems reviewed and are negative.    Physical Exam     Initial Vitals [06/30/23 1504]   BP Pulse Resp Temp SpO2   124/66 77 16 98.7 °F (37.1 °C) 95 %      MAP       --         Physical Exam    Nursing note and vitals reviewed.  Constitutional: He appears well-developed and well-nourished.   HENT:   Head: Normocephalic and atraumatic.   Eyes: EOM are normal. Pupils are equal, round, and reactive to light.   Neck: Neck supple.   Normal range of motion.  Cardiovascular:  Normal rate and regular rhythm.           No murmur heard.  Pulmonary/Chest: He has no wheezes. He has no rhonchi.   Abdominal: Abdomen is soft. He exhibits no distension. There is no abdominal tenderness.   Musculoskeletal:         General: No tenderness or edema.        Arms:       Cervical back: Normal range of motion and neck supple.     Lymphadenopathy:     He has no cervical adenopathy.   Neurological: He is alert and oriented to person, place, and time.   Skin: Skin is warm and dry. Capillary refill takes less than 2 seconds.   Psychiatric: He has a normal mood and affect. Thought content normal.       Medical Screening Exam   See Full Note    ED Course   Procedures  Labs Reviewed - No data to display       Imaging Results    None          Medications   sodium chloride 0.9% bolus 1,000 mL 1,000 mL (0 mLs Intravenous Stopped 6/30/23 1630)     Medical Decision Making:   Initial Assessment:   IV medication  Differential Diagnosis:   Dehydration  ED Management:  [facility]    Patient presents for emergent evaluation of acute IV medication that poses a threat to life and/or bodily function.    In the ED patient found to have acute dehydration.      Discharge MDM  Patient was managed in the ED with IV normal saline.    The response to treatment was good.     Patient was discharged in stable condition.  Detailed return precautions discussed.                         Clinical Impression:   Final diagnoses:  [E86.0] Dehydration (Primary)        ED Disposition Condition    Discharge Stable          ED Prescriptions    None       Follow-up Information    None          LEXIE Irving  06/30/23 3822

## 2023-09-15 ENCOUNTER — PATIENT MESSAGE (OUTPATIENT)
Dept: FAMILY MEDICINE | Facility: CLINIC | Age: 88
End: 2023-09-15
Payer: MEDICARE

## 2023-09-15 DIAGNOSIS — L60.0 INGROWN NAIL: Primary | ICD-10-CM

## 2023-10-04 ENCOUNTER — OFFICE VISIT (OUTPATIENT)
Dept: FAMILY MEDICINE | Facility: CLINIC | Age: 88
End: 2023-10-04
Payer: MEDICARE

## 2023-10-04 ENCOUNTER — APPOINTMENT (OUTPATIENT)
Dept: RADIOLOGY | Facility: CLINIC | Age: 88
End: 2023-10-04
Attending: FAMILY MEDICINE
Payer: MEDICARE

## 2023-10-04 VITALS
DIASTOLIC BLOOD PRESSURE: 86 MMHG | HEIGHT: 71 IN | HEART RATE: 103 BPM | SYSTOLIC BLOOD PRESSURE: 138 MMHG | OXYGEN SATURATION: 95 % | TEMPERATURE: 98 F | BODY MASS INDEX: 17.64 KG/M2 | WEIGHT: 126 LBS

## 2023-10-04 DIAGNOSIS — R63.0 ANOREXIA: ICD-10-CM

## 2023-10-04 DIAGNOSIS — E86.0 DEHYDRATION: ICD-10-CM

## 2023-10-04 DIAGNOSIS — K59.03 DRUG-INDUCED CONSTIPATION: ICD-10-CM

## 2023-10-04 DIAGNOSIS — R11.0 NAUSEA: Primary | ICD-10-CM

## 2023-10-04 DIAGNOSIS — R11.0 NAUSEA: ICD-10-CM

## 2023-10-04 DIAGNOSIS — D64.9 ANEMIA, UNSPECIFIED TYPE: ICD-10-CM

## 2023-10-04 DIAGNOSIS — N18.9 CHRONIC KIDNEY DISEASE, UNSPECIFIED CKD STAGE: ICD-10-CM

## 2023-10-04 LAB
ALBUMIN SERPL BCP-MCNC: 3 G/DL (ref 3.5–5)
ALBUMIN/GLOB SERPL: 0.7 {RATIO}
ALP SERPL-CCNC: 81 U/L (ref 45–115)
ALT SERPL W P-5'-P-CCNC: 40 U/L (ref 16–61)
ANION GAP SERPL CALCULATED.3IONS-SCNC: 15 MMOL/L (ref 7–16)
AST SERPL W P-5'-P-CCNC: 19 U/L (ref 15–37)
BASOPHILS # BLD AUTO: 0.03 K/UL (ref 0–0.2)
BASOPHILS NFR BLD AUTO: 0.2 % (ref 0–1)
BILIRUB SERPL-MCNC: 1.2 MG/DL (ref ?–1.2)
BUN SERPL-MCNC: 22 MG/DL (ref 7–18)
BUN/CREAT SERPL: 14 (ref 6–20)
CALCIUM SERPL-MCNC: 9.4 MG/DL (ref 8.5–10.1)
CHLORIDE SERPL-SCNC: 106 MMOL/L (ref 98–107)
CO2 SERPL-SCNC: 24 MMOL/L (ref 21–32)
CREAT SERPL-MCNC: 1.52 MG/DL (ref 0.7–1.3)
DIFFERENTIAL METHOD BLD: ABNORMAL
EGFR (NO RACE VARIABLE) (RUSH/TITUS): 43 ML/MIN/1.73M2
EOSINOPHIL # BLD AUTO: 0 K/UL (ref 0–0.5)
EOSINOPHIL NFR BLD AUTO: 0 % (ref 1–4)
ERYTHROCYTE [DISTWIDTH] IN BLOOD BY AUTOMATED COUNT: 16.8 % (ref 11.5–14.5)
GLOBULIN SER-MCNC: 4.2 G/DL (ref 2–4)
GLUCOSE SERPL-MCNC: 121 MG/DL (ref 74–106)
HCT VFR BLD AUTO: 38.6 % (ref 40–54)
HGB BLD-MCNC: 12 G/DL (ref 13.5–18)
IMM GRANULOCYTES # BLD AUTO: 0.07 K/UL (ref 0–0.04)
IMM GRANULOCYTES NFR BLD: 0.5 % (ref 0–0.4)
LYMPHOCYTES # BLD AUTO: 1 K/UL (ref 1–4.8)
LYMPHOCYTES NFR BLD AUTO: 6.9 % (ref 27–41)
MCH RBC QN AUTO: 26.8 PG (ref 27–31)
MCHC RBC AUTO-ENTMCNC: 31.1 G/DL (ref 32–36)
MCV RBC AUTO: 86.4 FL (ref 80–96)
MONOCYTES # BLD AUTO: 0.94 K/UL (ref 0–0.8)
MONOCYTES NFR BLD AUTO: 6.5 % (ref 2–6)
MPC BLD CALC-MCNC: 10.5 FL (ref 9.4–12.4)
NEUTROPHILS # BLD AUTO: 12.4 K/UL (ref 1.8–7.7)
NEUTROPHILS NFR BLD AUTO: 85.9 % (ref 53–65)
NRBC # BLD AUTO: 0 X10E3/UL
NRBC, AUTO (.00): 0 %
PLATELET # BLD AUTO: 256 K/UL (ref 150–400)
POTASSIUM SERPL-SCNC: 3.9 MMOL/L (ref 3.5–5.1)
PROT SERPL-MCNC: 7.2 G/DL (ref 6.4–8.2)
RBC # BLD AUTO: 4.47 M/UL (ref 4.6–6.2)
SODIUM SERPL-SCNC: 141 MMOL/L (ref 136–145)
WBC # BLD AUTO: 14.44 K/UL (ref 4.5–11)

## 2023-10-04 PROCEDURE — 1101F PT FALLS ASSESS-DOCD LE1/YR: CPT | Mod: ,,, | Performed by: FAMILY MEDICINE

## 2023-10-04 PROCEDURE — 3288F FALL RISK ASSESSMENT DOCD: CPT | Mod: ,,, | Performed by: FAMILY MEDICINE

## 2023-10-04 PROCEDURE — 99213 PR OFFICE/OUTPT VISIT, EST, LEVL III, 20-29 MIN: ICD-10-PCS | Mod: GC,,, | Performed by: FAMILY MEDICINE

## 2023-10-04 PROCEDURE — 1126F PR PAIN SEVERITY QUANTIFIED, NO PAIN PRESENT: ICD-10-PCS | Mod: ,,, | Performed by: FAMILY MEDICINE

## 2023-10-04 PROCEDURE — 85025 COMPLETE CBC W/AUTO DIFF WBC: CPT | Mod: ,,, | Performed by: CLINICAL MEDICAL LABORATORY

## 2023-10-04 PROCEDURE — 99213 OFFICE O/P EST LOW 20 MIN: CPT | Mod: GC,,, | Performed by: FAMILY MEDICINE

## 2023-10-04 PROCEDURE — 1101F PR PT FALLS ASSESS DOC 0-1 FALLS W/OUT INJ PAST YR: ICD-10-PCS | Mod: ,,, | Performed by: FAMILY MEDICINE

## 2023-10-04 PROCEDURE — 74018 XR KUB: ICD-10-PCS | Mod: 26,,, | Performed by: RADIOLOGY

## 2023-10-04 PROCEDURE — 74018 RADEX ABDOMEN 1 VIEW: CPT | Mod: TC,RHCUB | Performed by: FAMILY MEDICINE

## 2023-10-04 PROCEDURE — 1126F AMNT PAIN NOTED NONE PRSNT: CPT | Mod: ,,, | Performed by: FAMILY MEDICINE

## 2023-10-04 PROCEDURE — 80053 COMPREHENSIVE METABOLIC PANEL: ICD-10-PCS | Mod: ,,, | Performed by: CLINICAL MEDICAL LABORATORY

## 2023-10-04 PROCEDURE — 3288F PR FALLS RISK ASSESSMENT DOCUMENTED: ICD-10-PCS | Mod: ,,, | Performed by: FAMILY MEDICINE

## 2023-10-04 PROCEDURE — 74018 RADEX ABDOMEN 1 VIEW: CPT | Mod: 26,,, | Performed by: RADIOLOGY

## 2023-10-04 PROCEDURE — 85025 CBC WITH DIFFERENTIAL: ICD-10-PCS | Mod: ,,, | Performed by: CLINICAL MEDICAL LABORATORY

## 2023-10-04 PROCEDURE — 80053 COMPREHEN METABOLIC PANEL: CPT | Mod: ,,, | Performed by: CLINICAL MEDICAL LABORATORY

## 2023-10-04 NOTE — PROGRESS NOTES
Subjective:       Patient ID: Coleman Lares is a 89 y.o. male.    Chief Complaint: Anorexia (Loss of appetite x 2 days ) and Nausea (X 2 days )    Pt is a 89 y/o male who presents to clinic with a C/O I dont feel like eating I have no appetite for the past two days. Pt also reports his stool is soft primarily diarrhea. Pt Also reports he does not want to go to hospital today. Pt is fraile appearing but not in distress. Pt arrived alone in clinic today and is not a good historian. Pt denies abdominal pain, blood in stool, fever, chills.  PMH includes Chronic constipation and daily hydrocodone for back pain, CKD,ANEMIA, COPD, HTN/ and HLD.     I will do KUB to look for impaction/constipation, negative per my interpretation in clinic.  CBC pending, CMP pending. Plan is to stop linzess, miralax for 3 days . Immodium OTC as needed if not improving in 3 days RTC or Go to ER for further eval.          Current Outpatient Medications:     acetaminophen (TYLENOL) 325 MG tablet, Take 325 mg by mouth every 6 (six) hours as needed for Pain., Disp: , Rfl:     albuterol (PROVENTIL/VENTOLIN HFA) 90 mcg/actuation inhaler, Inhale 2 puffs into the lungs., Disp: , Rfl:     aspirin (ECOTRIN) 81 MG EC tablet, Take 1 tablet (81 mg total) by mouth once daily., Disp: 30 tablet, Rfl: 5    carvediloL (COREG) 6.25 MG tablet, Take 1 tablet (6.25 mg total) by mouth once daily., Disp: 90 tablet, Rfl: 2    cyanocobalamin, vitamin B-12, 1,000 mcg Cap, , Disp: , Rfl:     docusate sodium (COLACE) 250 MG capsule, Take 1 capsule (250 mg total) by mouth once daily., Disp: 30 capsule, Rfl: 2    ergocalciferol (ERGOCALCIFEROL) 50,000 unit Cap, Take 50,000 Units by mouth every 7 days., Disp: , Rfl:     ergocalciferol, vitamin D2, (VITAMIN D2 ORAL), 1 tablet., Disp: , Rfl:     ferrous sulfate (FEOSOL) 325 mg (65 mg iron) Tab tablet, Take by mouth., Disp: , Rfl:     HYDROcodone-acetaminophen (NORCO)  mg per tablet, Take 1 tablet by mouth 2 (two)  times daily as needed., Disp: , Rfl:     ipratropium-albuteroL (COMBIVENT RESPIMAT)  mcg/actuation inhaler, Inhale 1 puff into the lungs., Disp: , Rfl:     linaCLOtide (LINZESS) 290 mcg Cap capsule, Take 1 capsule (290 mcg total) by mouth before breakfast., Disp: 30 capsule, Rfl: 3    losartan (COZAAR) 50 MG tablet, Take 1 tablet by mouth once daily, Disp: 90 tablet, Rfl: 0    magnesium hydroxide (DE LEON CHEWS) 311 MG Chew, Take 1 tablet (311 mg total) by mouth 2 (two) times daily as needed (for severe constipation)., Disp: 120 tablet, Rfl: 0    meloxicam (MOBIC) 7.5 MG tablet, Take 1 tablet (7.5 mg total) by mouth once daily., Disp: 30 tablet, Rfl: 2    methylnaltrexone (RELISTOR) 150 mg Tab, Take 450 mg by mouth every morning., Disp: 90 tablet, Rfl: 0    mineral oil (FLEET OIL RETENTION) enema, Place 133 mLs (1 enema total) rectally daily as needed for Constipation., Disp: 10 enema, Rfl: 0    multivitamin (THERAGRAN) per tablet, , Disp: , Rfl:     NIFEdipine (ADALAT CC) 30 MG TbSR, as directed, Disp: , Rfl:     NIFEdipine (PROCARDIA-XL) 30 MG (OSM) 24 hr tablet, Take 1 tablet (30 mg total) by mouth once daily., Disp: 90 tablet, Rfl: 2    omega 3-dha-epa-fish oil 300-1,000 mg CpDR capsule, 1 capsule., Disp: , Rfl:     ondansetron (ZOFRAN-ODT) 4 MG TbDL, Take 1 tablet (4 mg total) by mouth every 8 (eight) hours as needed (nausea)., Disp: 20 tablet, Rfl: 0    polyethylene glycol (GLYCOLAX) 17 gram PwPk, Take 17 g by mouth 3 (three) times daily., Disp: 30 each, Rfl: 2    simvastatin (ZOCOR) 80 MG tablet, Take 1 tablet (80 mg total) by mouth every evening. Take 1/2 tablet once at night., Disp: 90 tablet, Rfl: 3    tamsulosin (FLOMAX) 0.4 mg Cap, Take 1 capsule (0.4 mg total) by mouth once daily., Disp: 90 capsule, Rfl: 2    terazosin (HYTRIN) 2 MG capsule, 1 capsule., Disp: , Rfl:     traZODone (DESYREL) 50 MG tablet, Take 1 tablet (50 mg total) by mouth every evening., Disp: 30 tablet, Rfl: 1    Review of  "patient's allergies indicates:  No Known Allergies    Past Medical History:   Diagnosis Date    Aortic regurgitation     Back pain     CAD (coronary artery disease)     Chronic back pain     Constipation     COPD (chronic obstructive pulmonary disease)     Generalized OA     Hyperlipidemia     Hypertension     Osteopenia determined by x-ray     Pulmonary stenosis        Past Surgical History:   Procedure Laterality Date    CORONARY ANGIOPLASTY WITH STENT PLACEMENT      FOOT SURGERY  2021    Dr Jackson    HIP SURGERY      LITHOTRIPSY         Family History   Problem Relation Age of Onset    Cancer Mother     Cancer Sister     Cancer Brother        Social History     Tobacco Use    Smoking status: Former    Smokeless tobacco: Never   Substance Use Topics    Alcohol use: Never    Drug use: Never       Review of Systems   Constitutional:  Positive for activity change. Negative for diaphoresis, fatigue, fever and unexpected weight change.        Frail appearance.     HENT:  Negative for rhinorrhea, sinus pressure/congestion and sneezing.    Respiratory:  Negative for cough, chest tightness, shortness of breath and wheezing.    Cardiovascular:  Negative for chest pain, palpitations and leg swelling.   Gastrointestinal:  Positive for nausea. Negative for abdominal distention, abdominal pain, blood in stool, constipation, diarrhea and vomiting.   Genitourinary:  Negative for difficulty urinating.   Musculoskeletal:  Positive for back pain.   Neurological:  Negative for dizziness, weakness, light-headedness and headaches.           Objective:      Vitals:    10/04/23 1453 10/04/23 1455   BP: (!) 146/98 138/86   BP Location: Right arm Left arm   Patient Position: Sitting Sitting   BP Method: Medium (Automatic) Medium (Manual)   Pulse: 103    Temp: 98.2 °F (36.8 °C)    TempSrc: Oral    SpO2: 95%    Weight: 57.2 kg (126 lb)    Height: 5' 11" (1.803 m)      Physical Exam  Constitutional:       Appearance: Normal appearance. "   HENT:      Head: Normocephalic and atraumatic.      Right Ear: External ear normal.      Left Ear: External ear normal.      Mouth/Throat:      Mouth: Mucous membranes are moist.      Pharynx: Oropharynx is clear.   Eyes:      Extraocular Movements: Extraocular movements intact.      Pupils: Pupils are equal, round, and reactive to light.   Cardiovascular:      Rate and Rhythm: Normal rate and regular rhythm.      Heart sounds: Normal heart sounds.   Pulmonary:      Effort: Pulmonary effort is normal.      Breath sounds: Normal breath sounds.   Abdominal:      Palpations: Abdomen is soft.   Musculoskeletal:      Cervical back: Neck supple.   Skin:     General: Skin is warm and dry.      Capillary Refill: Capillary refill takes less than 2 seconds.   Neurological:      Mental Status: He is alert and oriented to person, place, and time.   Psychiatric:         Mood and Affect: Mood normal.         Behavior: Behavior normal.         Lab Results   Component Value Date    WBC 6.39 05/04/2023    HGB 9.5 (L) 05/04/2023    HCT 33.5 (L) 05/04/2023     05/04/2023    CHOL 91 02/11/2022    TRIG 122 02/11/2022    HDL 13 (L) 02/11/2022    ALT 13 (L) 03/30/2023    AST 9 (L) 03/30/2023     05/04/2023    K 4.3 05/04/2023     (H) 05/04/2023    CREATININE 1.14 05/04/2023    BUN 13 05/04/2023    CO2 26 05/04/2023    TSH 0.306 (L) 02/10/2022    INR 1.15 03/23/2020    HGBA1C 5.9 10/27/2022      Assessment:       1. Nausea    2. Drug-induced constipation    3. Anemia, unspecified type    4. Chronic kidney disease, unspecified CKD stage    5. Anorexia    6. Dehydration        Plan:         Problem List Items Addressed This Visit          Renal/    Dehydration     Increase fluid intake.         CKD (chronic kidney disease)    Relevant Orders    Comprehensive Metabolic Panel       GI    Constipation    Relevant Orders    X-Ray KUB       Other    Anorexia     Other Visit Diagnoses       Nausea    -  Primary    Relevant  Orders    X-Ray KUB    Anemia, unspecified type        Relevant Orders    Comprehensive Metabolic Panel    CBC Auto Differential              Follow up in about 1 week (around 10/11/2023), or if symptoms worsen or fail to improve.    Azeb Avalos MD

## 2023-10-05 NOTE — PROGRESS NOTES
Discussed labs with SON Amy Lares, 1030Am 10/5/2023, Pt has EMETERIO most likely 2/2 dehydration. CBC with Elevated WBC mild. Pt would benefit form increased hydration (push fluids) or IV fluids. At visit yesterday patient did not want to go to hospital. Decision was made to try to rehydrate at home. I instructed Mr Reyes to take him to ED if not getting Better.

## 2023-10-06 ENCOUNTER — HOSPITAL ENCOUNTER (EMERGENCY)
Facility: HOSPITAL | Age: 88
Discharge: HOME | End: 2023-10-06
Attending: EMERGENCY MEDICINE
Payer: MEDICARE

## 2023-10-06 VITALS
OXYGEN SATURATION: 96 % | RESPIRATION RATE: 14 BRPM | DIASTOLIC BLOOD PRESSURE: 80 MMHG | HEIGHT: 71 IN | HEART RATE: 80 BPM | SYSTOLIC BLOOD PRESSURE: 125 MMHG | WEIGHT: 126 LBS | TEMPERATURE: 98 F | BODY MASS INDEX: 17.64 KG/M2

## 2023-10-06 DIAGNOSIS — R53.1 GENERAL WEAKNESS: ICD-10-CM

## 2023-10-06 DIAGNOSIS — N39.0 URINARY TRACT INFECTION WITHOUT HEMATURIA, SITE UNSPECIFIED: Primary | ICD-10-CM

## 2023-10-06 DIAGNOSIS — N21.0 BLADDER STONE: ICD-10-CM

## 2023-10-06 LAB
ALBUMIN SERPL BCP-MCNC: 2.6 G/DL (ref 3.5–5)
ALBUMIN/GLOB SERPL: 0.6 {RATIO}
ALP SERPL-CCNC: 73 U/L (ref 45–115)
ALT SERPL W P-5'-P-CCNC: 39 U/L (ref 16–61)
ANION GAP SERPL CALCULATED.3IONS-SCNC: 10 MMOL/L (ref 7–16)
AST SERPL W P-5'-P-CCNC: 23 U/L (ref 15–37)
BACTERIA #/AREA URNS HPF: ABNORMAL /HPF
BASOPHILS # BLD AUTO: 0.02 K/UL (ref 0–0.2)
BASOPHILS NFR BLD AUTO: 0.3 % (ref 0–1)
BILIRUB SERPL-MCNC: 0.6 MG/DL (ref ?–1.2)
BILIRUB UR QL STRIP: NEGATIVE
BUN SERPL-MCNC: 24 MG/DL (ref 7–18)
BUN/CREAT SERPL: 18 (ref 6–20)
CALCIUM SERPL-MCNC: 8.9 MG/DL (ref 8.5–10.1)
CHLORIDE SERPL-SCNC: 106 MMOL/L (ref 98–107)
CLARITY UR: ABNORMAL
CO2 SERPL-SCNC: 27 MMOL/L (ref 21–32)
COLOR UR: YELLOW
CREAT SERPL-MCNC: 1.32 MG/DL (ref 0.7–1.3)
DIFFERENTIAL METHOD BLD: ABNORMAL
EGFR (NO RACE VARIABLE) (RUSH/TITUS): 51 ML/MIN/1.73M2
EOSINOPHIL # BLD AUTO: 0.09 K/UL (ref 0–0.5)
EOSINOPHIL NFR BLD AUTO: 1.2 % (ref 1–4)
ERYTHROCYTE [DISTWIDTH] IN BLOOD BY AUTOMATED COUNT: 16.3 % (ref 11.5–14.5)
GLOBULIN SER-MCNC: 4.4 G/DL (ref 2–4)
GLUCOSE SERPL-MCNC: 120 MG/DL (ref 74–106)
GLUCOSE UR STRIP-MCNC: NORMAL MG/DL
HCT VFR BLD AUTO: 33.9 % (ref 40–54)
HGB BLD-MCNC: 10.5 G/DL (ref 13.5–18)
HYALINE CASTS #/AREA URNS LPF: ABNORMAL /LPF
IMM GRANULOCYTES # BLD AUTO: 0.03 K/UL (ref 0–0.04)
IMM GRANULOCYTES NFR BLD: 0.4 % (ref 0–0.4)
KETONES UR STRIP-SCNC: NEGATIVE MG/DL
LEUKOCYTE ESTERASE UR QL STRIP: ABNORMAL
LYMPHOCYTES # BLD AUTO: 1.07 K/UL (ref 1–4.8)
LYMPHOCYTES NFR BLD AUTO: 14.1 % (ref 27–41)
MCH RBC QN AUTO: 26.4 PG (ref 27–31)
MCHC RBC AUTO-ENTMCNC: 31 G/DL (ref 32–36)
MCV RBC AUTO: 85.4 FL (ref 80–96)
MONOCYTES # BLD AUTO: 0.65 K/UL (ref 0–0.8)
MONOCYTES NFR BLD AUTO: 8.6 % (ref 2–6)
MPC BLD CALC-MCNC: 10.2 FL (ref 9.4–12.4)
MUCOUS, UA: ABNORMAL /LPF
NEUTROPHILS # BLD AUTO: 5.73 K/UL (ref 1.8–7.7)
NEUTROPHILS NFR BLD AUTO: 75.4 % (ref 53–65)
NITRITE UR QL STRIP: POSITIVE
NRBC # BLD AUTO: 0 X10E3/UL
NRBC, AUTO (.00): 0 %
PH UR STRIP: 5.5 PH UNITS
PLATELET # BLD AUTO: 252 K/UL (ref 150–400)
POTASSIUM SERPL-SCNC: 3.3 MMOL/L (ref 3.5–5.1)
PROT SERPL-MCNC: 7 G/DL (ref 6.4–8.2)
PROT UR QL STRIP: 50
RBC # BLD AUTO: 3.97 M/UL (ref 4.6–6.2)
RBC # UR STRIP: ABNORMAL /UL
RBC #/AREA URNS HPF: >182 /HPF
SODIUM SERPL-SCNC: 140 MMOL/L (ref 136–145)
SP GR UR STRIP: 1.01
UROBILINOGEN UR STRIP-ACNC: NORMAL MG/DL
WBC # BLD AUTO: 7.59 K/UL (ref 4.5–11)
WBC #/AREA URNS HPF: >182 /HPF
WBC CLUMPS, UA: ABNORMAL /HPF

## 2023-10-06 PROCEDURE — 96361 HYDRATE IV INFUSION ADD-ON: CPT

## 2023-10-06 PROCEDURE — 99284 EMERGENCY DEPT VISIT MOD MDM: CPT | Mod: ,,, | Performed by: EMERGENCY MEDICINE

## 2023-10-06 PROCEDURE — 80053 COMPREHEN METABOLIC PANEL: CPT | Performed by: EMERGENCY MEDICINE

## 2023-10-06 PROCEDURE — 63600175 PHARM REV CODE 636 W HCPCS: Performed by: EMERGENCY MEDICINE

## 2023-10-06 PROCEDURE — 85025 COMPLETE CBC W/AUTO DIFF WBC: CPT | Performed by: EMERGENCY MEDICINE

## 2023-10-06 PROCEDURE — 25000003 PHARM REV CODE 250: Performed by: EMERGENCY MEDICINE

## 2023-10-06 PROCEDURE — 87186 SC STD MICRODIL/AGAR DIL: CPT | Performed by: EMERGENCY MEDICINE

## 2023-10-06 PROCEDURE — 81001 URINALYSIS AUTO W/SCOPE: CPT | Performed by: EMERGENCY MEDICINE

## 2023-10-06 PROCEDURE — 96365 THER/PROPH/DIAG IV INF INIT: CPT

## 2023-10-06 PROCEDURE — 99285 EMERGENCY DEPT VISIT HI MDM: CPT | Mod: 25

## 2023-10-06 PROCEDURE — 99284 PR EMERGENCY DEPT VISIT,LEVEL IV: ICD-10-PCS | Mod: ,,, | Performed by: EMERGENCY MEDICINE

## 2023-10-06 PROCEDURE — 87077 CULTURE AEROBIC IDENTIFY: CPT | Performed by: EMERGENCY MEDICINE

## 2023-10-06 RX ORDER — CEPHALEXIN 500 MG/1
500 CAPSULE ORAL 2 TIMES DAILY
Qty: 20 CAPSULE | Refills: 0 | Status: SHIPPED | OUTPATIENT
Start: 2023-10-06 | End: 2023-10-08 | Stop reason: ALTCHOICE

## 2023-10-06 RX ADMIN — SODIUM CHLORIDE 500 ML: 9 INJECTION, SOLUTION INTRAVENOUS at 09:10

## 2023-10-06 RX ADMIN — SODIUM CHLORIDE 500 ML: 9 INJECTION, SOLUTION INTRAVENOUS at 08:10

## 2023-10-06 RX ADMIN — DEXTROSE MONOHYDRATE 1 G: 5 INJECTION INTRAVENOUS at 10:10

## 2023-10-07 NOTE — ED PROVIDER NOTES
Encounter Date: 10/6/2023    SCRIBE #1 NOTE: I, Marion Case, am scribing for, and in the presence of,  Neeraj Centeno MD. I have scribed the entire note.       History     Chief Complaint   Patient presents with    Abnormal Lab     89 y.o. male presents to the ED with c/o dehydration, loss of appetite, and constipation. Son stated he went to the doctor a few days ago and was told he might have a urine infection and is a little dehydrated. He had an xray done and was told he may have nephrolithiasis. Patient takes norco for chronic back pain. No other symptoms were reported.     The history is provided by the patient and a relative. No  was used.     Review of patient's allergies indicates:  No Known Allergies  Past Medical History:   Diagnosis Date    Aortic regurgitation     BPH (benign prostatic hyperplasia) 11/03/2022    CAD (coronary artery disease)     CHF (congestive heart failure) 02/10/2022    EF 25%    Constipation     COVID-19 02/10/2022    Generalized OA     Hyperlipidemia     Hypertension     Lumbosacral spondylosis without myelopathy 09/07/2021    followed by [facility]    Moderate chronic obstructive pulmonary disease 02/10/2022    Osteopenia determined by x-ray     Therapeutic opioid-induced constipation (OIC) 07/07/2022    Vitamin D insufficiency 09/10/2021     Past Surgical History:   Procedure Laterality Date    CORONARY ANGIOPLASTY WITH STENT PLACEMENT      FOOT SURGERY  2021    Dr Jackson    HIP SURGERY      LITHOTRIPSY       Family History   Problem Relation Age of Onset    Cancer Mother     Cancer Sister     Cancer Brother      Social History     Tobacco Use    Smoking status: Former    Smokeless tobacco: Never   Substance Use Topics    Alcohol use: Never    Drug use: Never     Review of Systems   Constitutional:  Positive for appetite change.        Dehydration    HENT: Negative.     Eyes: Negative.    Respiratory: Negative.      Cardiovascular: Negative.    Gastrointestinal:  Positive for constipation. Negative for nausea and vomiting.   Endocrine: Negative.    Genitourinary: Negative.    Musculoskeletal: Negative.    Skin: Negative.    Allergic/Immunologic: Negative.    Neurological: Negative.    Hematological: Negative.    Psychiatric/Behavioral: Negative.     All other systems reviewed and are negative.      Physical Exam     Initial Vitals [10/06/23 1859]   BP Pulse Resp Temp SpO2   125/80 80 14 97.7 °F (36.5 °C) 96 %      MAP       --         Physical Exam    Nursing note and vitals reviewed.  Constitutional:   Poor skin turgor.    Neck:   Normal range of motion.  Cardiovascular:  Normal rate, regular rhythm and normal heart sounds.           Pulmonary/Chest: Breath sounds normal.   Abdominal: Abdomen is soft.   Musculoskeletal:         General: Normal range of motion.      Cervical back: Normal range of motion.     Neurological: He is alert and oriented to person, place, and time.   Skin: Skin is warm.         ED Course   Procedures  Labs Reviewed   COMPREHENSIVE METABOLIC PANEL - Abnormal; Notable for the following components:       Result Value    Potassium 3.3 (*)     Glucose 120 (*)     BUN 24 (*)     Creatinine 1.32 (*)     Albumin 2.6 (*)     Globulin 4.4 (*)     eGFR 51 (*)     All other components within normal limits   URINALYSIS, REFLEX TO URINE CULTURE - Abnormal; Notable for the following components:    Leukocytes, UA Large (*)     Nitrites, UA Positive (*)     Protein, UA 50 (*)     Blood, UA Large (*)     All other components within normal limits   CBC WITH DIFFERENTIAL - Abnormal; Notable for the following components:    RBC 3.97 (*)     Hemoglobin 10.5 (*)     Hematocrit 33.9 (*)     MCH 26.4 (*)     MCHC 31.0 (*)     RDW 16.3 (*)     Neutrophils % 75.4 (*)     Lymphocytes % 14.1 (*)     Monocytes % 8.6 (*)     All other components within normal limits   URINALYSIS, MICROSCOPIC - Abnormal; Notable for the following  components:    WBC, UA >182 (*)     RBC, UA >182 (*)     Bacteria, UA Many (*)     WBC Clumps Moderate (*)     Hyaline Casts, UA 2-5 (*)     Mucous Occasional (*)     All other components within normal limits   CULTURE, URINE   CBC W/ AUTO DIFFERENTIAL    Narrative:     The following orders were created for panel order CBC auto differential.  Procedure                               Abnormality         Status                     ---------                               -----------         ------                     CBC with Differential[[id number]]       Abnormal            Final result                 Please view results for these tests on the individual orders.          Imaging Results              X-Ray Chest 1 View (In process)                      CT Renal Stone Study ABD Pelvis WO (Final result)  Result time 10/06/23 19:38:00      Final result by Jesus Mcmahon MD (10/06/23 19:38:00)                   Impression:      Bilateral nephrolithiasis.  No hydronephrosis    Bladder calculus    Air in the bladder lumen may be related to recent instrumentation, though clinical correlation is requested.  There is no obvious bladder wall thickening to specifically suggest cystitis    Chronic interstitial infiltrate right lung base      Electronically signed by: Jesus Mcmahon  Date:    10/06/2023  Time:    19:38               Narrative:    EXAMINATION:  CT ABDOMEN AND PELVIS without contrast    CLINICAL HISTORY:  Nephrolithiasis, symptomatic/complicated    TECHNIQUE:  Axial CT images were obtained through the abdomen and pelvis without IV contrast.  Oral contrast was not given.  Coronal and sagittal reconstructions submitted and interpreted.  Total .8 mGycm.  Automated exposure control utilized.    COMPARISON:  February 10, 2022    FINDINGS:  CT abdomen:    There is some chronic appearing reticulonodular infiltrate in the right lower lung as before.  There is no definite acute infiltrate.    There is no  evidence of pneumoperitoneum    Liver, bile ducts, spleen, and adrenal glands are unremarkable in noncontrast CT appearance.  There is some diffuse fatty infiltration of the pancreas.    There is bilateral nephrolithiasis.  There is no hydronephrosis.  There is a rounded water density cyst lower pole right kidney measuring 2.5 cm.  No follow-up is currently recommended.    There is no focal aneurysm of the moderately calcified abdominal aorta.    Stomach is not well distended but is grossly unremarkable.  There is no nat bowel obstruction.  There is diverticulosis of the colon without nat diverticulitis.    CT pelvis:    There is an 8.5 mm calculus in the bladder lumen posteriorly and left laterally.  Bladder is otherwise grossly unremarkable.  There is metallic artifact from the right hip prosthesis which limits evaluation.  There is a small amount of air within the lumen of the bladder.    There is no soft tissue mass in the pelvis.    No acute osseous findings.  There is prominent degenerative disc narrowing scattered throughout the lumbar spine                                       Medications   sodium chloride 0.9% bolus 500 mL 500 mL (0 mLs Intravenous Stopped 10/6/23 2101)   sodium chloride 0.9% bolus 500 mL 500 mL (0 mLs Intravenous Stopped 10/6/23 2250)   cefTRIAXone (ROCEPHIN) 1 g in dextrose 5 % in water (D5W) 100 mL IVPB (MB+) (0 g Intravenous Stopped 10/6/23 2303)     Medical Decision Making  [facility]    Patient presents for emergent evaluation of acute decreasing appetite generalized fatigue that poses a threat to life and/or bodily function.    In the ED patient found to have acute bladder stone urinary tract infection.    I ordered labs and personally reviewed them.  Labs significant for pyuria bacteriuria hematuria  I ordered CT scan and personally reviewed it and reviewed the radiologist interpretation.  CT significant for 8 mm bladder stone.      Discharge [facility]  Patient was managed in the ED with IV  Rocephin normal saline.    The response to treatment was improved.    Patient was discharged in stable condition.  Detailed return precautions discussed.     Amount and/or Complexity of Data Reviewed  Labs: ordered.  Radiology: ordered.    Risk  Prescription drug management.              Attending Attestation:           Physician Attestation for Scribe:  Physician Attestation Statement for Scribe #1: INeeraj MD, reviewed documentation, as scribed by Marion Case in my presence, and it is both accurate and complete.                             Clinical Impression:   Final diagnoses:  [R53.1] General weakness  [N39.0] Urinary tract infection without hematuria, site unspecified (Primary)  [N21.0] Bladder stone        ED Disposition Condition    Discharge Stable          ED Prescriptions       Medication Sig Dispense Start Date End Date Auth. Provider    cephALEXin (KEFLEX) 500 MG capsule Take 1 capsule (500 mg total) by mouth 2 (two) times a day. for 10 days 20 capsule 10/6/2023 10/16/2023 Neeraj Centeno MD          Follow-up Information    None          Neeraj Centeno MD  10/07/23 0004

## 2023-10-07 NOTE — DISCHARGE INSTRUCTIONS
Drink plenty of fluids.  Gradually increase your diet and nutrition    Start the antibiotic prescription tomorrow (Saturday)    Follow-up with urology.  Also follow up with primary care    Use stool softeners twice a day.  Also use 1 scoop of MiraLax daily.  Use enemas as needed

## 2023-10-07 NOTE — ED TRIAGE NOTES
Presents to the emergency department with decreased PO intake and constipation. Reports that he went to clinic today and had lab work and an XR completed and was told to come to the emergency department for further work up.

## 2023-10-08 ENCOUNTER — TELEPHONE (OUTPATIENT)
Dept: EMERGENCY MEDICINE | Facility: HOSPITAL | Age: 88
End: 2023-10-08
Payer: MEDICARE

## 2023-10-08 DIAGNOSIS — N39.0 URINARY TRACT INFECTION WITHOUT HEMATURIA, SITE UNSPECIFIED: Primary | ICD-10-CM

## 2023-10-08 LAB — UA COMPLETE W REFLEX CULTURE PNL UR: ABNORMAL

## 2023-10-08 RX ORDER — NITROFURANTOIN 25; 75 MG/1; MG/1
100 CAPSULE ORAL 2 TIMES DAILY
Qty: 20 CAPSULE | Refills: 0 | Status: SHIPPED | OUTPATIENT
Start: 2023-10-08 | End: 2023-10-18

## 2023-10-08 NOTE — TELEPHONE ENCOUNTER
----- Message from LEXIE Bañuelos sent at 10/8/2023  9:21 AM CDT -----  Please let him know to stop the keflex and start macrobid for the UTI.

## 2023-10-10 DIAGNOSIS — K59.00 CONSTIPATION, UNSPECIFIED CONSTIPATION TYPE: ICD-10-CM

## 2023-10-12 DIAGNOSIS — N40.0 BENIGN PROSTATIC HYPERPLASIA, UNSPECIFIED WHETHER LOWER URINARY TRACT SYMPTOMS PRESENT: ICD-10-CM

## 2023-10-12 DIAGNOSIS — E78.5 HYPERLIPIDEMIA, UNSPECIFIED HYPERLIPIDEMIA TYPE: ICD-10-CM

## 2023-10-12 DIAGNOSIS — I10 HYPERTENSION, UNSPECIFIED TYPE: ICD-10-CM

## 2023-10-12 DIAGNOSIS — K59.03 DRUG-INDUCED CONSTIPATION: ICD-10-CM

## 2023-10-17 RX ORDER — TAMSULOSIN HYDROCHLORIDE 0.4 MG/1
1 CAPSULE ORAL
Qty: 90 CAPSULE | Refills: 0 | Status: SHIPPED | OUTPATIENT
Start: 2023-10-17

## 2023-10-17 RX ORDER — SIMVASTATIN 80 MG/1
80 TABLET, FILM COATED ORAL NIGHTLY
Qty: 90 TABLET | Refills: 3 | Status: SHIPPED | OUTPATIENT
Start: 2023-10-17

## 2023-10-17 RX ORDER — DOCUSATE SODIUM 250 MG
250 CAPSULE ORAL DAILY
Qty: 30 CAPSULE | Refills: 2 | Status: SHIPPED | OUTPATIENT
Start: 2023-10-17

## 2023-10-17 RX ORDER — LINACLOTIDE 290 UG/1
290 CAPSULE, GELATIN COATED ORAL
Qty: 30 CAPSULE | Refills: 0 | Status: SHIPPED | OUTPATIENT
Start: 2023-10-17

## 2023-10-17 RX ORDER — ALBUTEROL SULFATE 90 UG/1
2 AEROSOL, METERED RESPIRATORY (INHALATION) EVERY 4 HOURS PRN
Qty: 6.7 G | Refills: 0 | Status: SHIPPED | OUTPATIENT
Start: 2023-10-17

## 2023-10-18 DIAGNOSIS — E78.5 HYPERLIPIDEMIA, UNSPECIFIED HYPERLIPIDEMIA TYPE: ICD-10-CM

## 2023-10-18 DIAGNOSIS — I10 HYPERTENSION, UNSPECIFIED TYPE: ICD-10-CM

## 2023-10-18 DIAGNOSIS — K59.03 DRUG-INDUCED CONSTIPATION: ICD-10-CM

## 2023-10-20 RX ORDER — LOSARTAN POTASSIUM 50 MG/1
TABLET ORAL
Qty: 90 TABLET | Refills: 0 | Status: SHIPPED | OUTPATIENT
Start: 2023-10-20

## 2024-01-09 DIAGNOSIS — Z71.89 COMPLEX CARE COORDINATION: ICD-10-CM

## 2024-10-11 ENCOUNTER — OFFICE VISIT (OUTPATIENT)
Dept: FAMILY MEDICINE | Facility: CLINIC | Age: 89
End: 2024-10-11
Payer: MEDICARE

## 2024-10-11 VITALS
OXYGEN SATURATION: 95 % | SYSTOLIC BLOOD PRESSURE: 129 MMHG | DIASTOLIC BLOOD PRESSURE: 76 MMHG | HEIGHT: 71 IN | BODY MASS INDEX: 20.72 KG/M2 | TEMPERATURE: 98 F | WEIGHT: 148 LBS | HEART RATE: 79 BPM

## 2024-10-11 DIAGNOSIS — N40.0 BENIGN PROSTATIC HYPERPLASIA, UNSPECIFIED WHETHER LOWER URINARY TRACT SYMPTOMS PRESENT: ICD-10-CM

## 2024-10-11 DIAGNOSIS — E78.5 HYPERLIPIDEMIA, UNSPECIFIED HYPERLIPIDEMIA TYPE: ICD-10-CM

## 2024-10-11 DIAGNOSIS — K21.00 GASTROESOPHAGEAL REFLUX DISEASE WITH ESOPHAGITIS WITHOUT HEMORRHAGE: ICD-10-CM

## 2024-10-11 DIAGNOSIS — I10 HYPERTENSION, UNSPECIFIED TYPE: Primary | ICD-10-CM

## 2024-10-11 DIAGNOSIS — M54.9 CHRONIC MIDLINE BACK PAIN, UNSPECIFIED BACK LOCATION: ICD-10-CM

## 2024-10-11 DIAGNOSIS — G89.29 CHRONIC MIDLINE BACK PAIN, UNSPECIFIED BACK LOCATION: ICD-10-CM

## 2024-10-11 DIAGNOSIS — K59.00 CONSTIPATION, UNSPECIFIED CONSTIPATION TYPE: ICD-10-CM

## 2024-10-11 DIAGNOSIS — K59.03 DRUG-INDUCED CONSTIPATION: ICD-10-CM

## 2024-10-11 DIAGNOSIS — J44.9 CHRONIC OBSTRUCTIVE PULMONARY DISEASE, UNSPECIFIED COPD TYPE: ICD-10-CM

## 2024-10-11 DIAGNOSIS — E11.9 TYPE 2 DIABETES MELLITUS WITHOUT COMPLICATION, WITHOUT LONG-TERM CURRENT USE OF INSULIN: ICD-10-CM

## 2024-10-11 PROBLEM — J84.10 PULMONARY FIBROSIS: Status: ACTIVE | Noted: 2024-10-11

## 2024-10-11 LAB
ALBUMIN SERPL BCP-MCNC: 3 G/DL (ref 3.5–5)
ALBUMIN/GLOB SERPL: 0.9 {RATIO}
ALP SERPL-CCNC: 98 U/L (ref 45–115)
ALT SERPL W P-5'-P-CCNC: 35 U/L (ref 16–61)
ANION GAP SERPL CALCULATED.3IONS-SCNC: 8 MMOL/L (ref 7–16)
AST SERPL W P-5'-P-CCNC: 17 U/L (ref 15–37)
BASOPHILS # BLD AUTO: 0.02 K/UL (ref 0–0.2)
BASOPHILS NFR BLD AUTO: 0.3 % (ref 0–1)
BILIRUB SERPL-MCNC: 0.8 MG/DL (ref ?–1.2)
BUN SERPL-MCNC: 13 MG/DL (ref 7–18)
BUN/CREAT SERPL: 12 (ref 6–20)
CALCIUM SERPL-MCNC: 8.9 MG/DL (ref 8.5–10.1)
CHLORIDE SERPL-SCNC: 109 MMOL/L (ref 98–107)
CHOLEST SERPL-MCNC: 95 MG/DL (ref 0–200)
CHOLEST/HDLC SERPL: 1.8 {RATIO}
CO2 SERPL-SCNC: 29 MMOL/L (ref 21–32)
CREAT SERPL-MCNC: 1.09 MG/DL (ref 0.7–1.3)
DIFFERENTIAL METHOD BLD: ABNORMAL
EGFR (NO RACE VARIABLE) (RUSH/TITUS): 64 ML/MIN/1.73M2
EOSINOPHIL # BLD AUTO: 0.16 K/UL (ref 0–0.5)
EOSINOPHIL NFR BLD AUTO: 2.2 % (ref 1–4)
ERYTHROCYTE [DISTWIDTH] IN BLOOD BY AUTOMATED COUNT: 14.6 % (ref 11.5–14.5)
GLOBULIN SER-MCNC: 3.5 G/DL (ref 2–4)
GLUCOSE SERPL-MCNC: 112 MG/DL (ref 74–106)
HCT VFR BLD AUTO: 39.8 % (ref 40–54)
HDLC SERPL-MCNC: 54 MG/DL (ref 40–60)
HGB BLD-MCNC: 11.8 G/DL (ref 13.5–18)
IMM GRANULOCYTES # BLD AUTO: 0.03 K/UL (ref 0–0.04)
IMM GRANULOCYTES NFR BLD: 0.4 % (ref 0–0.4)
LDLC SERPL CALC-MCNC: 21 MG/DL
LYMPHOCYTES # BLD AUTO: 1.1 K/UL (ref 1–4.8)
LYMPHOCYTES NFR BLD AUTO: 15.3 % (ref 27–41)
MCH RBC QN AUTO: 28.6 PG (ref 27–31)
MCHC RBC AUTO-ENTMCNC: 29.6 G/DL (ref 32–36)
MCV RBC AUTO: 96.4 FL (ref 80–96)
MONOCYTES # BLD AUTO: 0.6 K/UL (ref 0–0.8)
MONOCYTES NFR BLD AUTO: 8.4 % (ref 2–6)
MPC BLD CALC-MCNC: 10.4 FL (ref 9.4–12.4)
NEUTROPHILS # BLD AUTO: 5.26 K/UL (ref 1.8–7.7)
NEUTROPHILS NFR BLD AUTO: 73.4 % (ref 53–65)
NONHDLC SERPL-MCNC: 41 MG/DL
NRBC # BLD AUTO: 0 X10E3/UL
NRBC, AUTO (.00): 0 %
PLATELET # BLD AUTO: 182 K/UL (ref 150–400)
POTASSIUM SERPL-SCNC: 4 MMOL/L (ref 3.5–5.1)
PROT SERPL-MCNC: 6.5 G/DL (ref 6.4–8.2)
RBC # BLD AUTO: 4.13 M/UL (ref 4.6–6.2)
SODIUM SERPL-SCNC: 142 MMOL/L (ref 136–145)
TRIGL SERPL-MCNC: 99 MG/DL (ref 35–150)
TSH SERPL DL<=0.005 MIU/L-ACNC: 1.87 UIU/ML (ref 0.36–3.74)
VLDLC SERPL-MCNC: 20 MG/DL
WBC # BLD AUTO: 7.17 K/UL (ref 4.5–11)

## 2024-10-11 PROCEDURE — 80053 COMPREHEN METABOLIC PANEL: CPT | Mod: ,,, | Performed by: CLINICAL MEDICAL LABORATORY

## 2024-10-11 PROCEDURE — 84443 ASSAY THYROID STIM HORMONE: CPT | Mod: ,,, | Performed by: CLINICAL MEDICAL LABORATORY

## 2024-10-11 PROCEDURE — 85025 COMPLETE CBC W/AUTO DIFF WBC: CPT | Mod: ,,, | Performed by: CLINICAL MEDICAL LABORATORY

## 2024-10-11 PROCEDURE — 80061 LIPID PANEL: CPT | Mod: ,,, | Performed by: CLINICAL MEDICAL LABORATORY

## 2024-10-11 RX ORDER — TRAZODONE HYDROCHLORIDE 50 MG/1
50 TABLET ORAL NIGHTLY
Qty: 90 TABLET | Refills: 3 | Status: SHIPPED | OUTPATIENT
Start: 2024-10-11

## 2024-10-11 RX ORDER — CARVEDILOL 6.25 MG/1
6.25 TABLET ORAL DAILY
Qty: 90 TABLET | Refills: 3 | Status: SHIPPED | OUTPATIENT
Start: 2024-10-11

## 2024-10-11 RX ORDER — PANTOPRAZOLE SODIUM 40 MG/1
TABLET, DELAYED RELEASE ORAL
COMMUNITY
Start: 2024-05-06 | End: 2024-10-11 | Stop reason: SDUPTHER

## 2024-10-11 RX ORDER — DOCUSATE SODIUM 250 MG
250 CAPSULE ORAL 2 TIMES DAILY
Qty: 180 CAPSULE | Refills: 3 | Status: SHIPPED | OUTPATIENT
Start: 2024-10-11

## 2024-10-11 RX ORDER — VIT A/VIT C/VIT E/ZINC/COPPER 2148-113
1 TABLET ORAL 2 TIMES DAILY
Qty: 180 TABLET | Refills: 3 | Status: SHIPPED | OUTPATIENT
Start: 2024-10-11

## 2024-10-11 RX ORDER — TAMSULOSIN HYDROCHLORIDE 0.4 MG/1
1 CAPSULE ORAL DAILY
Qty: 90 CAPSULE | Refills: 3 | Status: SHIPPED | OUTPATIENT
Start: 2024-10-11

## 2024-10-11 RX ORDER — PANTOPRAZOLE SODIUM 40 MG/1
40 TABLET, DELAYED RELEASE ORAL DAILY
Qty: 90 TABLET | Refills: 3 | Status: SHIPPED | OUTPATIENT
Start: 2024-10-11

## 2024-10-11 RX ORDER — CHOLECALCIFEROL (VITAMIN D3) 25 MCG
1 TABLET,CHEWABLE ORAL DAILY
Qty: 90 CAPSULE | Refills: 3 | Status: SHIPPED | OUTPATIENT
Start: 2024-10-11

## 2024-10-11 RX ORDER — ASPIRIN 81 MG/1
81 TABLET ORAL DAILY
Qty: 90 TABLET | Refills: 3 | Status: SHIPPED | OUTPATIENT
Start: 2024-10-11

## 2024-10-11 RX ORDER — OMEGA-3/DHA/EPA/FISH OIL 300-1000MG
1 CAPSULE,DELAYED RELEASE (ENTERIC COATED) ORAL DAILY
Qty: 90 CAPSULE | Refills: 3 | Status: SHIPPED | OUTPATIENT
Start: 2024-10-11

## 2024-10-11 RX ORDER — FERROUS SULFATE 325(65) MG
325 TABLET ORAL 2 TIMES DAILY
Qty: 180 TABLET | Refills: 3 | Status: SHIPPED | OUTPATIENT
Start: 2024-10-11

## 2024-10-11 RX ORDER — MULTIVITAMIN
1 TABLET ORAL DAILY
Qty: 90 TABLET | Refills: 3 | Status: SHIPPED | OUTPATIENT
Start: 2024-10-11 | End: 2024-10-11

## 2024-10-11 RX ORDER — CHOLECALCIFEROL (VITAMIN D3) 50 MCG
1 TABLET ORAL DAILY
Qty: 90 EACH | Refills: 3 | Status: SHIPPED | OUTPATIENT
Start: 2024-10-11

## 2024-10-11 RX ORDER — LOSARTAN POTASSIUM 50 MG/1
50 TABLET ORAL DAILY
Qty: 90 TABLET | Refills: 3 | Status: SHIPPED | OUTPATIENT
Start: 2024-10-11

## 2024-10-11 NOTE — PROGRESS NOTES
Subjective     Patient ID: Coleman Lares is a 89 y.o. male.    Chief Complaint: Establish Care    Pt presents to establish care.      Review of Systems   Constitutional:  Negative for activity change, appetite change, fatigue and fever.   HENT:  Negative for nasal congestion, nosebleeds, postnasal drip, rhinorrhea, sinus pressure/congestion, sneezing and sore throat.    Eyes:  Negative for pain and itching.   Respiratory:  Negative for cough, chest tightness, shortness of breath, wheezing and stridor.    Cardiovascular:  Negative for chest pain.   Gastrointestinal:  Negative for abdominal pain.   Genitourinary:  Negative for dysuria.   Musculoskeletal:  Negative for back pain.   Neurological:  Negative for dizziness and headaches.   Psychiatric/Behavioral:  Negative for behavioral problems and confusion.           Objective     Physical Exam  Vitals and nursing note reviewed.   Constitutional:       Appearance: Normal appearance.   Cardiovascular:      Rate and Rhythm: Normal rate and regular rhythm.      Heart sounds: Normal heart sounds.   Pulmonary:      Effort: Pulmonary effort is normal.      Breath sounds: Normal breath sounds.   Musculoskeletal:         General: Normal range of motion.   Neurological:      Mental Status: He is alert and oriented to person, place, and time.   Psychiatric:         Mood and Affect: Mood normal.         Behavior: Behavior normal.            Assessment and Plan     1. Hypertension, unspecified type  -     CBC Auto Differential; Future; Expected date: 10/11/2024  -     Comprehensive Metabolic Panel; Future; Expected date: 10/11/2024  -     Lipid Panel; Future; Expected date: 10/11/2024  -     TSH; Future; Expected date: 10/11/2024  -     traZODone (DESYREL) 50 MG tablet; Take 1 tablet (50 mg total) by mouth every evening.  Dispense: 90 tablet; Refill: 3  -     losartan (COZAAR) 50 MG tablet; Take 1 tablet (50 mg total) by mouth once daily.  Dispense: 90 tablet; Refill: 3  -      carvediloL (COREG) 6.25 MG tablet; Take 1 tablet (6.25 mg total) by mouth once daily.  Dispense: 90 tablet; Refill: 3    2. Hyperlipidemia, unspecified hyperlipidemia type  -     traZODone (DESYREL) 50 MG tablet; Take 1 tablet (50 mg total) by mouth every evening.  Dispense: 90 tablet; Refill: 3  -     losartan (COZAAR) 50 MG tablet; Take 1 tablet (50 mg total) by mouth once daily.  Dispense: 90 tablet; Refill: 3  -     carvediloL (COREG) 6.25 MG tablet; Take 1 tablet (6.25 mg total) by mouth once daily.  Dispense: 90 tablet; Refill: 3    3. Constipation, unspecified constipation type  -     Discontinue: linaCLOtide (LINZESS) 290 mcg Cap capsule; Take 1 capsule (290 mcg total) by mouth before breakfast.  Dispense: 90 capsule; Refill: 3  -     docusate sodium (COLACE) 250 MG capsule; Take 1 capsule (250 mg total) by mouth 2 (two) times a day.  Dispense: 180 capsule; Refill: 3    4. Chronic midline back pain, unspecified back location  -     Cancel: Ambulatory referral/consult to Pain Clinic; Future; Expected date: 10/18/2024  -     Ambulatory referral/consult to Pain Clinic; Future; Expected date: 10/18/2024    5. Gastroesophageal reflux disease with esophagitis without hemorrhage    6. Chronic obstructive pulmonary disease, unspecified COPD type    7. Drug-induced constipation  -     traZODone (DESYREL) 50 MG tablet; Take 1 tablet (50 mg total) by mouth every evening.  Dispense: 90 tablet; Refill: 3  -     losartan (COZAAR) 50 MG tablet; Take 1 tablet (50 mg total) by mouth once daily.  Dispense: 90 tablet; Refill: 3  -     carvediloL (COREG) 6.25 MG tablet; Take 1 tablet (6.25 mg total) by mouth once daily.  Dispense: 90 tablet; Refill: 3    8. Benign prostatic hyperplasia, unspecified whether lower urinary tract symptoms present  -     tamsulosin (FLOMAX) 0.4 mg Cap; Take 1 capsule (0.4 mg total) by mouth once daily.  Dispense: 90 capsule; Refill: 3    9. Type 2 diabetes mellitus without complication, without  long-term current use of insulin  -     aspirin (ECOTRIN) 81 MG EC tablet; Take 1 tablet (81 mg total) by mouth once daily.  Dispense: 90 tablet; Refill: 3    Other orders  -     pantoprazole (PROTONIX) 40 MG tablet; Take 1 tablet (40 mg total) by mouth once daily.  Dispense: 90 tablet; Refill: 3  -     omega 3-dha-epa-fish oil 300-1,000 mg CpDR capsule; Take 1 capsule by mouth once daily.  Dispense: 90 capsule; Refill: 3  -     Discontinue: multivitamin (THERAGRAN) per tablet; Take 1 tablet by mouth once daily.  Dispense: 90 tablet; Refill: 3  -     ferrous sulfate (FEOSOL) 325 mg (65 mg iron) Tab tablet; Take 1 tablet (325 mg total) by mouth 2 (two) times daily.  Dispense: 180 tablet; Refill: 3  -     cyanocobalamin, vitamin B-12, 1,000 mcg Cap; Take 1 tablet by mouth once daily.  Dispense: 90 capsule; Refill: 3  -     cholecalciferol, vitamin D3, (VITAMIN D3) 50 mcg (2,000 unit) Tab; Take 1 tablet (2,000 Units total) by mouth once daily.  Dispense: 90 each; Refill: 3  -     vitamins A,C,E-zinc-copper (PRESERVISION AREDS) 2,148 mcg-113 mg-45 mg-17.4mg Tab; Take 1 tablet by mouth 2 (two) times a day.  Dispense: 180 tablet; Refill: 3        Will call pt with lab results. Entered a referral to pain treatment for chronic back pain.         Follow up in about 3 months (around 1/11/2025).

## 2024-10-14 ENCOUNTER — PATIENT MESSAGE (OUTPATIENT)
Dept: FAMILY MEDICINE | Facility: CLINIC | Age: 89
End: 2024-10-14
Payer: MEDICARE

## 2024-10-14 DIAGNOSIS — K59.00 CONSTIPATION, UNSPECIFIED CONSTIPATION TYPE: ICD-10-CM

## 2024-10-14 DIAGNOSIS — J44.9 CHRONIC OBSTRUCTIVE PULMONARY DISEASE, UNSPECIFIED COPD TYPE: Primary | ICD-10-CM

## 2024-10-14 RX ORDER — BUDESONIDE AND FORMOTEROL FUMARATE DIHYDRATE 80; 4.5 UG/1; UG/1
2 AEROSOL RESPIRATORY (INHALATION) DAILY
Qty: 10.2 G | Refills: 11 | Status: SHIPPED | OUTPATIENT
Start: 2024-10-14

## 2024-10-14 RX ORDER — ALBUTEROL SULFATE 90 UG/1
2 INHALANT RESPIRATORY (INHALATION) EVERY 4 HOURS PRN
Qty: 6.7 G | Refills: 0 | Status: SHIPPED | OUTPATIENT
Start: 2024-10-14

## 2024-12-11 ENCOUNTER — PATIENT MESSAGE (OUTPATIENT)
Dept: FAMILY MEDICINE | Facility: CLINIC | Age: 89
End: 2024-12-11
Payer: MEDICARE

## 2024-12-11 ENCOUNTER — APPOINTMENT (OUTPATIENT)
Dept: RADIOLOGY | Facility: CLINIC | Age: 89
End: 2024-12-11
Attending: NURSE PRACTITIONER
Payer: MEDICARE

## 2024-12-11 ENCOUNTER — OFFICE VISIT (OUTPATIENT)
Dept: FAMILY MEDICINE | Facility: CLINIC | Age: 89
End: 2024-12-11
Payer: MEDICARE

## 2024-12-11 VITALS
DIASTOLIC BLOOD PRESSURE: 84 MMHG | WEIGHT: 152.38 LBS | BODY MASS INDEX: 21.33 KG/M2 | HEART RATE: 89 BPM | SYSTOLIC BLOOD PRESSURE: 126 MMHG | HEIGHT: 71 IN | OXYGEN SATURATION: 92 % | RESPIRATION RATE: 18 BRPM | TEMPERATURE: 99 F

## 2024-12-11 DIAGNOSIS — R06.02 SHORTNESS OF BREATH: ICD-10-CM

## 2024-12-11 DIAGNOSIS — J44.9 CHRONIC OBSTRUCTIVE PULMONARY DISEASE, UNSPECIFIED COPD TYPE: ICD-10-CM

## 2024-12-11 DIAGNOSIS — R06.02 SHORTNESS OF BREATH: Primary | ICD-10-CM

## 2024-12-11 DIAGNOSIS — J84.10 PULMONARY FIBROSIS: ICD-10-CM

## 2024-12-11 PROCEDURE — 1101F PT FALLS ASSESS-DOCD LE1/YR: CPT | Mod: ,,, | Performed by: NURSE PRACTITIONER

## 2024-12-11 PROCEDURE — 1159F MED LIST DOCD IN RCRD: CPT | Mod: ,,, | Performed by: NURSE PRACTITIONER

## 2024-12-11 PROCEDURE — 3288F FALL RISK ASSESSMENT DOCD: CPT | Mod: ,,, | Performed by: NURSE PRACTITIONER

## 2024-12-11 PROCEDURE — 71046 X-RAY EXAM CHEST 2 VIEWS: CPT | Mod: TC,RHCUB | Performed by: NURSE PRACTITIONER

## 2024-12-11 PROCEDURE — 71046 X-RAY EXAM CHEST 2 VIEWS: CPT | Mod: 26,,, | Performed by: RADIOLOGY

## 2024-12-11 PROCEDURE — 99213 OFFICE O/P EST LOW 20 MIN: CPT | Mod: ,,, | Performed by: NURSE PRACTITIONER

## 2024-12-11 RX ORDER — BUDESONIDE AND FORMOTEROL FUMARATE DIHYDRATE 160; 4.5 UG/1; UG/1
2 AEROSOL RESPIRATORY (INHALATION) EVERY 12 HOURS
Qty: 10.2 G | Refills: 11 | Status: SHIPPED | OUTPATIENT
Start: 2024-12-11

## 2024-12-11 NOTE — PROGRESS NOTES
Subjective     Patient ID: Coleman Lares is a 89 y.o. male.    Chief Complaint: trouble breathing (More trouble when laying down ,trying to recline in recliner.wants to start using nebulizer along with inhaler.)    Pt presents with chronic sob. History of pulmonary fibrosis. Pt has not seen a pulmonologist since moving to this area.       Review of Systems   Constitutional:  Negative for activity change, appetite change, fatigue and fever.   HENT:  Negative for nasal congestion, nosebleeds, postnasal drip, rhinorrhea, sinus pressure/congestion, sneezing and sore throat.    Eyes:  Negative for pain and itching.   Respiratory:  Positive for shortness of breath. Negative for cough, chest tightness, wheezing and stridor.    Cardiovascular:  Negative for chest pain.   Gastrointestinal:  Negative for abdominal pain.   Genitourinary:  Negative for dysuria.   Musculoskeletal:  Negative for back pain.   Neurological:  Negative for dizziness and headaches.   Psychiatric/Behavioral:  Negative for behavioral problems and confusion.           Objective     Physical Exam  Vitals and nursing note reviewed.   Constitutional:       Appearance: Normal appearance.   Cardiovascular:      Rate and Rhythm: Normal rate and regular rhythm.      Heart sounds: Normal heart sounds.   Pulmonary:      Effort: Pulmonary effort is normal.      Breath sounds: Normal breath sounds.   Musculoskeletal:         General: Normal range of motion.   Neurological:      Mental Status: He is alert and oriented to person, place, and time.   Psychiatric:         Mood and Affect: Mood normal.         Behavior: Behavior normal.            Assessment and Plan     1. Shortness of breath  -     X-Ray Chest PA And Lateral; Future; Expected date: 12/11/2024    2. Chronic obstructive pulmonary disease, unspecified COPD type  -     budesonide-formoterol 160-4.5 mcg (SYMBICORT) 160-4.5 mcg/actuation HFAA; Inhale 2 puffs into the lungs every 12 (twelve) hours.  Controller  Dispense: 10.2 g; Refill: 11    3. Pulmonary fibrosis  -     Ambulatory referral/consult to Pulmonology; Future; Expected date: 12/18/2024        Increase Symbicort dose. Will call pt with the xray results. Referral to pulmonology. Pt declined a steroid injection.          Follow up if symptoms worsen or fail to improve.

## 2024-12-12 DIAGNOSIS — R06.02 SHORTNESS OF BREATH: Primary | ICD-10-CM

## 2025-01-10 ENCOUNTER — TELEPHONE (OUTPATIENT)
Dept: PULMONOLOGY | Facility: CLINIC | Age: OVER 89
End: 2025-01-10
Payer: MEDICARE

## 2025-01-10 NOTE — TELEPHONE ENCOUNTER
Called patient son, no answer left voicemail. Patient has appt with Dr. Patel 1/30/2025 @ 1 pm, need to move his appt to that morning if possible.

## 2025-01-13 ENCOUNTER — TELEPHONE (OUTPATIENT)
Dept: PULMONOLOGY | Facility: CLINIC | Age: OVER 89
End: 2025-01-13
Payer: MEDICARE

## 2025-01-13 NOTE — TELEPHONE ENCOUNTER
Called left voicemail and moved patient to 1/21/2024 @ 1250 with Dr. Sy in [place].      ----- Message from Page sent at 1/13/2025 10:45 AM CST -----  Who Called: Coleman Lares    Caller is requesting a sooner appointment.     When is the first available appointment? January 30      Preferred Method of Contact: Phone Call  Patient's Preferred Phone Number on File: [phone]   Best Call Back Number, if different:  Additional Information: pt called for sooner appt with sometime in the morning

## 2025-01-15 ENCOUNTER — TELEPHONE (OUTPATIENT)
Dept: FAMILY MEDICINE | Facility: CLINIC | Age: OVER 89
End: 2025-01-15
Payer: MEDICARE

## 2025-01-15 NOTE — TELEPHONE ENCOUNTER
----- Message from LEXIE David sent at 1/14/2025 11:55 AM CST -----  Regarding: FW: [facility]    ----- Message -----  From: Jaci Portillo  Sent: 1/14/2025  11:47 AM CST  To: Gaby BLAIR Staff  Subject: [facility]                                Pharmacy is calling to request assistance with Rx    Pharmacy name and phone number: [facility] [place]   Pharmacy contact: Vladimir  Patient Name: Colemanyaneli AntoineArnaud  Prescription Name: Linzess     Preferred Method of Contact: Phone Call  Patient's Preferred Phone Number on File: [phone]

## 2025-01-21 ENCOUNTER — OFFICE VISIT (OUTPATIENT)
Dept: PULMONOLOGY | Facility: CLINIC | Age: OVER 89
End: 2025-01-21
Payer: MEDICARE

## 2025-01-21 VITALS
HEART RATE: 78 BPM | HEIGHT: 71 IN | RESPIRATION RATE: 18 BRPM | DIASTOLIC BLOOD PRESSURE: 82 MMHG | WEIGHT: 151.63 LBS | SYSTOLIC BLOOD PRESSURE: 140 MMHG | BODY MASS INDEX: 21.23 KG/M2 | OXYGEN SATURATION: 96 %

## 2025-01-21 DIAGNOSIS — R06.00 DYSPNEA, UNSPECIFIED TYPE: ICD-10-CM

## 2025-01-21 DIAGNOSIS — R05.3 CHRONIC COUGH: ICD-10-CM

## 2025-01-21 DIAGNOSIS — J84.10 PULMONARY FIBROSIS: Primary | ICD-10-CM

## 2025-01-21 PROCEDURE — 99999 PR PBB SHADOW E&M-EST. PATIENT-LVL V: CPT | Mod: PBBFAC,,, | Performed by: STUDENT IN AN ORGANIZED HEALTH CARE EDUCATION/TRAINING PROGRAM

## 2025-01-21 PROCEDURE — 99204 OFFICE O/P NEW MOD 45 MIN: CPT | Mod: S$PBB,,, | Performed by: STUDENT IN AN ORGANIZED HEALTH CARE EDUCATION/TRAINING PROGRAM

## 2025-01-21 PROCEDURE — 99215 OFFICE O/P EST HI 40 MIN: CPT | Mod: PBBFAC | Performed by: STUDENT IN AN ORGANIZED HEALTH CARE EDUCATION/TRAINING PROGRAM

## 2025-01-21 RX ORDER — KETOCONAZOLE 20 MG/G
1 CREAM TOPICAL
COMMUNITY
Start: 2025-01-03

## 2025-01-21 RX ORDER — IPRATROPIUM BROMIDE 0.5 MG/2.5ML
SOLUTION RESPIRATORY (INHALATION)
COMMUNITY
Start: 2024-05-06

## 2025-01-21 RX ORDER — SIMVASTATIN 80 MG/1
1 TABLET, FILM COATED ORAL NIGHTLY
COMMUNITY
Start: 2024-05-06

## 2025-01-21 NOTE — PROGRESS NOTES
Patient Name: Coleman Lares   Primary Care Provider: Sugey Griffin FNP   Date of Service: 01/21/2025   Reason for Referral:  Pulmonary fibrosis    Chief Complaint: Pulmonary Fibrosis (Moved here from [facility] few months ago and needs to establish with a pulmonologist now. No issues no problems)      Subjective:      Coleman Lares is 89 y.o. male with past medical history significant for pulmonary fibrosis (unspecified type) that has been monitored at a different facility, patient presents today to establish care.    Initial clinic visit 1/21/25   The patient does have dyspnea with exertion, chronic cough and occasionally shortness of breath with rest.  I reviewed his chest x-ray from 12/11/24 personally which shows evidence of bilateral interstitial process without areas of consolidation pleural effusion.    Assessment and Plan      Pulmonary fibrosis/interstitial lung disease   Dyspnea with exertion   Chronic cough      Assessment:  Patient's symptoms appear to be stable at this time, does have chronic dyspnea and interstitial changes seen on recent chest x-ray.  Received care for pulmonary fibrosis at outside hospital.  We will obtain baseline imaging to establish with a baseline as well as pulmonary function testing.    Plan  CT chest high-resolution   Ordered:Complete PFTs with pre and post bronchodilator testing and 6 minute walk test (pulmonary stress test)    Counseled to call the clinic or go to the emergency department/call 911 in the event of worsening symptoms or any other red flag signs/symptoms as explained to the patient in detail    Booker Patel MD  Interventional Pulmonary and Critical Care  [facility]      Problem List Items Addressed This Visit          Pulmonary    Pulmonary fibrosis           Past Medical History:   Diagnosis Date    Aortic regurgitation     BPH (benign prostatic hyperplasia) 11/03/2022    CAD (coronary artery disease)     CHF (congestive heart  failure) 02/10/2022    EF 25%    Constipation     COVID-19 02/10/2022    Generalized OA     Hyperlipidemia     Hypertension     Lumbosacral spondylosis without myelopathy 09/07/2021    followed by [facility]    Moderate chronic obstructive pulmonary disease 02/10/2022    Osteopenia determined by x-ray     Therapeutic opioid-induced constipation (OIC) 07/07/2022    Vitamin D insufficiency 09/10/2021      Past Surgical History:   Procedure Laterality Date    CORONARY ANGIOPLASTY WITH STENT PLACEMENT      FOOT SURGERY  2021    Dr Jackson    HIP SURGERY      LITHOTRIPSY       Family History   Problem Relation Name Age of Onset    Cancer Mother      Cancer Sister      Cancer Brother       Review of patient's allergies indicates:  No Known Allergies   Social History     Tobacco Use    Smoking status: Former    Smokeless tobacco: Never   Substance Use Topics    Alcohol use: Never    Drug use: Never      Review of Systems       Objective:      Physical Exam  Constitutional:       General: He is not in acute distress.     Appearance: Normal appearance. He is normal weight. He is not ill-appearing or diaphoretic.   HENT:      Head: Normocephalic and atraumatic.      Nose: No congestion or rhinorrhea.      Mouth/Throat:      Mouth: Mucous membranes are moist.   Cardiovascular:      Rate and Rhythm: Normal rate and regular rhythm.      Pulses: Normal pulses.      Heart sounds: Normal heart sounds.   Pulmonary:      Effort: Pulmonary effort is normal. No respiratory distress.      Breath sounds: No stridor. Rhonchi present. No wheezing or rales.   Chest:      Chest wall: No tenderness.   Abdominal:      General: Abdomen is flat.   Musculoskeletal:      Cervical back: Normal range of motion. No rigidity.      Right lower leg: No edema.      Left lower leg: No edema.   Skin:     General: Skin is warm.      Findings: No erythema.   Neurological:      General: No focal deficit present.      Mental Status: He  "is alert and oriented to person, place, and time. Mental status is at baseline.   Psychiatric:         Mood and Affect: Mood normal.         Behavior: Behavior normal.         Thought Content: Thought content normal.                1/21/2025    10:31 AM 12/11/2024     1:17 PM 10/11/2024    10:51 AM 10/6/2023     6:59 PM 10/4/2023     2:53 PM 6/30/2023     4:40 PM 6/30/2023     3:04 PM   Pulmonary Function Tests   SpO2 96 % 92 % 95 % 96 % 95 % 95 % 95 %   Height 5' 11" (1.803 m) 5' 11" (1.803 m) 5' 11" (1.803 m) 5' 11" (1.803 m) 5' 11" (1.803 m)  5' 11" (1.803 m)   Weight 68.8 kg (151 lb 9.6 oz) 69.1 kg (152 lb 6.4 oz) 67.1 kg (148 lb) 57.2 kg (126 lb) 57.2 kg (126 lb)  63.5 kg (140 lb)   BMI (Calculated) 21.2 21.3 20.7 17.6 17.6  19.5         Outpatient Encounter Medications as of 1/21/2025   Medication Sig Dispense Refill    aspirin (ECOTRIN) 81 MG EC tablet Take 1 tablet (81 mg total) by mouth once daily. 90 tablet 3    budesonide-formoterol 160-4.5 mcg (SYMBICORT) 160-4.5 mcg/actuation HFAA Inhale 2 puffs into the lungs every 12 (twelve) hours. Controller 10.2 g 11    carvediloL (COREG) 6.25 MG tablet Take 1 tablet (6.25 mg total) by mouth once daily. 90 tablet 3    ferrous sulfate (FEOSOL) 325 mg (65 mg iron) Tab tablet Take 1 tablet (325 mg total) by mouth 2 (two) times daily. 180 tablet 3    HYDROcodone-acetaminophen (NORCO)  mg per tablet Take 1 tablet by mouth 2 (two) times daily as needed.      ipratropium (ATROVENT) 0.02 % nebulizer solution Inhale into the lungs.      ipratropium-albuteroL (COMBIVENT)  mcg/actuation inhaler Take 1 puff by mouth 4 (four) times daily.      ketoconazole (NIZORAL) 2 % cream Apply 1 application  topically.      linaCLOtide (LINZESS) 290 mcg Cap capsule Take 1 capsule by mouth once daily.      losartan (COZAAR) 50 MG tablet Take 1 tablet (50 mg total) by mouth once daily. 90 tablet 3    naloxegoL (MOVANTIK) 12.5 mg Tab Take 1 tablet by mouth once daily.      omega " 3-dha-epa-fish oil 300-1,000 mg CpDR capsule Take 1 capsule by mouth once daily. 90 capsule 3    pantoprazole (PROTONIX) 40 MG tablet Take 1 tablet (40 mg total) by mouth once daily. 90 tablet 3    simvastatin (ZOCOR) 80 MG tablet Take 1 tablet by mouth every evening.      tamsulosin (FLOMAX) 0.4 mg Cap Take 1 capsule (0.4 mg total) by mouth once daily. 90 capsule 3    traZODone (DESYREL) 50 MG tablet Take 1 tablet (50 mg total) by mouth every evening. 90 tablet 3    vitamins A,C,E-zinc-copper (PRESERVISION AREDS) 2,148 mcg-113 mg-45 mg-17.4mg Tab Take 1 tablet by mouth 2 (two) times a day. 180 tablet 3    albuterol (PROVENTIL/VENTOLIN HFA) 90 mcg/actuation inhaler Inhale 2 puffs into the lungs every 4 (four) hours as needed for Wheezing. (Patient not taking: Reported on 1/21/2025) 6.7 g 0     No facility-administered encounter medications on file as of 1/21/2025.       Assessment & Plan    As above                                          No orders of the defined types were placed in this encounter.

## 2025-03-07 ENCOUNTER — TELEPHONE (OUTPATIENT)
Dept: PULMONOLOGY | Facility: CLINIC | Age: OVER 89
End: 2025-03-07
Payer: MEDICARE

## 2025-03-07 NOTE — TELEPHONE ENCOUNTER
----- Message from Eve sent at 3/7/2025  9:53 AM CST -----  Regarding: reschedule appt  Who Called: Coleman Colvin is requesting assistance/information from provider's office.Patient needs to reschedule CT scan that is scheduled for March 14, 2025 at 1:00Preferred Method of Contact: Phone CallPatient's Preferred Phone Number on File: [phone] Best Call Back Number, if different:Additional Information:

## 2025-03-07 NOTE — TELEPHONE ENCOUNTER
Spoke with patient and voiced to me that he would like all appts pushed out about a month, stated to me he is doing well and has no concerning symptoms. Voiced to him I will get him r/s and he is aware of this

## 2025-03-11 ENCOUNTER — OFFICE VISIT (OUTPATIENT)
Dept: FAMILY MEDICINE | Facility: CLINIC | Age: OVER 89
End: 2025-03-11
Payer: MEDICARE

## 2025-03-11 VITALS
DIASTOLIC BLOOD PRESSURE: 82 MMHG | TEMPERATURE: 98 F | BODY MASS INDEX: 21.84 KG/M2 | HEIGHT: 71 IN | OXYGEN SATURATION: 98 % | HEART RATE: 70 BPM | WEIGHT: 156 LBS | SYSTOLIC BLOOD PRESSURE: 137 MMHG

## 2025-03-11 DIAGNOSIS — J84.10 PULMONARY FIBROSIS: ICD-10-CM

## 2025-03-11 DIAGNOSIS — L03.90 CELLULITIS, UNSPECIFIED CELLULITIS SITE: ICD-10-CM

## 2025-03-11 DIAGNOSIS — I10 HYPERTENSION, UNSPECIFIED TYPE: Primary | ICD-10-CM

## 2025-03-11 DIAGNOSIS — E11.9 TYPE 2 DIABETES MELLITUS WITHOUT COMPLICATION, WITHOUT LONG-TERM CURRENT USE OF INSULIN: ICD-10-CM

## 2025-03-11 DIAGNOSIS — E78.5 HYPERLIPIDEMIA, UNSPECIFIED HYPERLIPIDEMIA TYPE: ICD-10-CM

## 2025-03-11 DIAGNOSIS — L60.2 OVERGROWN TOENAILS: ICD-10-CM

## 2025-03-11 LAB
ALBUMIN SERPL BCP-MCNC: 3.2 G/DL (ref 3.4–4.8)
ALBUMIN/GLOB SERPL: 1.1 {RATIO}
ALP SERPL-CCNC: 98 U/L (ref 40–150)
ALT SERPL W P-5'-P-CCNC: 12 U/L
ANION GAP SERPL CALCULATED.3IONS-SCNC: 11 MMOL/L (ref 7–16)
AST SERPL W P-5'-P-CCNC: 24 U/L (ref 11–45)
BASOPHILS # BLD AUTO: 0.07 K/UL (ref 0–0.2)
BASOPHILS NFR BLD AUTO: 1.3 % (ref 0–1)
BILIRUB SERPL-MCNC: 0.5 MG/DL
BUN SERPL-MCNC: 15 MG/DL (ref 8–26)
BUN/CREAT SERPL: 14 (ref 6–20)
CALCIUM SERPL-MCNC: 8.7 MG/DL (ref 8.8–10)
CHLORIDE SERPL-SCNC: 110 MMOL/L (ref 98–111)
CHOLEST SERPL-MCNC: 146 MG/DL
CHOLEST/HDLC SERPL: 2.5 {RATIO}
CO2 SERPL-SCNC: 25 MMOL/L (ref 23–31)
CREAT SERPL-MCNC: 1.11 MG/DL (ref 0.72–1.25)
DIFFERENTIAL METHOD BLD: ABNORMAL
EGFR (NO RACE VARIABLE) (RUSH/TITUS): 62 ML/MIN/1.73M2
EOSINOPHIL # BLD AUTO: 0.23 K/UL (ref 0–0.5)
EOSINOPHIL NFR BLD AUTO: 4.2 % (ref 1–4)
ERYTHROCYTE [DISTWIDTH] IN BLOOD BY AUTOMATED COUNT: 15.2 % (ref 11.5–14.5)
EST. AVERAGE GLUCOSE BLD GHB EST-MCNC: 111 MG/DL
GLOBULIN SER-MCNC: 2.8 G/DL (ref 2–4)
GLUCOSE SERPL-MCNC: 91 MG/DL (ref 75–121)
HBA1C MFR BLD HPLC: 5.5 %
HCT VFR BLD AUTO: 41.1 % (ref 40–54)
HDLC SERPL-MCNC: 59 MG/DL (ref 35–60)
HGB BLD-MCNC: 12.3 G/DL (ref 13.5–18)
IMM GRANULOCYTES # BLD AUTO: 0.02 K/UL (ref 0–0.04)
IMM GRANULOCYTES NFR BLD: 0.4 % (ref 0–0.4)
LDLC SERPL CALC-MCNC: 72 MG/DL
LDLC/HDLC SERPL: 1.2 {RATIO}
LYMPHOCYTES # BLD AUTO: 1.52 K/UL (ref 1–4.8)
LYMPHOCYTES NFR BLD AUTO: 27.8 % (ref 27–41)
MCH RBC QN AUTO: 29.1 PG (ref 27–31)
MCHC RBC AUTO-ENTMCNC: 29.9 G/DL (ref 32–36)
MCV RBC AUTO: 97.2 FL (ref 80–96)
MONOCYTES # BLD AUTO: 0.44 K/UL (ref 0–0.8)
MONOCYTES NFR BLD AUTO: 8 % (ref 2–6)
MPC BLD CALC-MCNC: 10.5 FL (ref 9.4–12.4)
NEUTROPHILS # BLD AUTO: 3.19 K/UL (ref 1.8–7.7)
NEUTROPHILS NFR BLD AUTO: 58.3 % (ref 53–65)
NONHDLC SERPL-MCNC: 87 MG/DL
NRBC # BLD AUTO: 0 X10E3/UL
NRBC, AUTO (.00): 0 %
PLATELET # BLD AUTO: 200 K/UL (ref 150–400)
POTASSIUM SERPL-SCNC: 4.2 MMOL/L (ref 3.5–5.1)
PROT SERPL-MCNC: 6 G/DL (ref 5.8–7.6)
RBC # BLD AUTO: 4.23 M/UL (ref 4.6–6.2)
SODIUM SERPL-SCNC: 142 MMOL/L (ref 136–145)
T3FREE SERPL-MCNC: 2.95 PG/ML (ref 1.58–3.91)
T4 FREE SERPL-MCNC: 0.85 NG/DL (ref 0.7–1.48)
TRIGL SERPL-MCNC: 77 MG/DL (ref 34–140)
TSH SERPL DL<=0.005 MIU/L-ACNC: 2.34 UIU/ML (ref 0.35–4.94)
VLDLC SERPL-MCNC: 15 MG/DL
WBC # BLD AUTO: 5.47 K/UL (ref 4.5–11)

## 2025-03-11 PROCEDURE — 84481 FREE ASSAY (FT-3): CPT | Mod: ,,, | Performed by: CLINICAL MEDICAL LABORATORY

## 2025-03-11 PROCEDURE — 1159F MED LIST DOCD IN RCRD: CPT | Mod: ,,, | Performed by: NURSE PRACTITIONER

## 2025-03-11 PROCEDURE — 80053 COMPREHEN METABOLIC PANEL: CPT | Mod: ,,, | Performed by: CLINICAL MEDICAL LABORATORY

## 2025-03-11 PROCEDURE — 83036 HEMOGLOBIN GLYCOSYLATED A1C: CPT | Mod: ,,, | Performed by: CLINICAL MEDICAL LABORATORY

## 2025-03-11 PROCEDURE — 3288F FALL RISK ASSESSMENT DOCD: CPT | Mod: ,,, | Performed by: NURSE PRACTITIONER

## 2025-03-11 PROCEDURE — 1126F AMNT PAIN NOTED NONE PRSNT: CPT | Mod: ,,, | Performed by: NURSE PRACTITIONER

## 2025-03-11 PROCEDURE — 80061 LIPID PANEL: CPT | Mod: ,,, | Performed by: CLINICAL MEDICAL LABORATORY

## 2025-03-11 PROCEDURE — 84443 ASSAY THYROID STIM HORMONE: CPT | Mod: ,,, | Performed by: CLINICAL MEDICAL LABORATORY

## 2025-03-11 PROCEDURE — 85025 COMPLETE CBC W/AUTO DIFF WBC: CPT | Mod: ,,, | Performed by: CLINICAL MEDICAL LABORATORY

## 2025-03-11 PROCEDURE — 84439 ASSAY OF FREE THYROXINE: CPT | Mod: ,,, | Performed by: CLINICAL MEDICAL LABORATORY

## 2025-03-11 PROCEDURE — 99214 OFFICE O/P EST MOD 30 MIN: CPT | Mod: ,,, | Performed by: NURSE PRACTITIONER

## 2025-03-11 PROCEDURE — 1101F PT FALLS ASSESS-DOCD LE1/YR: CPT | Mod: ,,, | Performed by: NURSE PRACTITIONER

## 2025-03-11 PROCEDURE — 1160F RVW MEDS BY RX/DR IN RCRD: CPT | Mod: ,,, | Performed by: NURSE PRACTITIONER

## 2025-03-11 RX ORDER — CEPHALEXIN 500 MG/1
500 CAPSULE ORAL 4 TIMES DAILY
Qty: 40 CAPSULE | Refills: 0 | Status: SHIPPED | OUTPATIENT
Start: 2025-03-11 | End: 2025-03-21

## 2025-03-11 NOTE — PROGRESS NOTES
Subjective     Patient ID: Coleman Lares is a 89 y.o. male.    Chief Complaint: 3 Month Hypertension fu (Edema in R foot/Big toe nail injury/)    Pt presents for a hypertension follow-up and over grown toenails.       Review of Systems   Constitutional:  Negative for activity change, appetite change, fatigue and fever.   HENT:  Negative for nasal congestion, nosebleeds, postnasal drip, rhinorrhea, sinus pressure/congestion, sneezing and sore throat.    Eyes:  Negative for pain and itching.   Respiratory:  Negative for cough, chest tightness, shortness of breath, wheezing and stridor.    Cardiovascular:  Negative for chest pain.   Gastrointestinal:  Negative for abdominal pain.   Genitourinary:  Negative for dysuria.   Musculoskeletal:  Negative for back pain.   Integumentary:  Positive for wound.   Neurological:  Negative for dizziness and headaches.   Psychiatric/Behavioral:  Negative for behavioral problems and confusion.           Objective     Physical Exam  Vitals and nursing note reviewed.   Constitutional:       Appearance: Normal appearance.   Cardiovascular:      Rate and Rhythm: Normal rate and regular rhythm.      Heart sounds: Normal heart sounds.   Pulmonary:      Effort: Pulmonary effort is normal.      Breath sounds: Normal breath sounds.   Musculoskeletal:         General: Normal range of motion.        Feet:    Feet:      Comments: Over grown toenails, minimal bleeding from bumping toenail   Neurological:      Mental Status: He is alert and oriented to person, place, and time.   Psychiatric:         Mood and Affect: Mood normal.         Behavior: Behavior normal.            Assessment and Plan     1. Hypertension, unspecified type  -     CBC Auto Differential; Future; Expected date: 03/11/2025  -     Comprehensive Metabolic Panel; Future; Expected date: 03/11/2025  -     Lipid Panel; Future; Expected date: 03/11/2025  -     TSH; Future; Expected date: 03/11/2025  -     T3, Free; Future; Expected  date: 03/11/2025  -     T4, Free; Future; Expected date: 03/11/2025  Controlled at 137/82  Low sodium diet    2. Pulmonary fibrosis    3. Hyperlipidemia, unspecified hyperlipidemia type  Low cholesterol diet  Avoid fried foods    4. Type 2 diabetes mellitus without complication, without long-term current use of insulin  -     Hemoglobin A1C; Future; Expected date: 03/11/2025  Low sugar diet    5. Cellulitis, unspecified cellulitis site  Clean feet daily with dial soap and water    6. Overgrown toenails  -     Ambulatory referral/consult to Podiatry; Future; Expected date: 03/18/2025  -     cephALEXin (KEFLEX) 500 MG capsule; Take 1 capsule (500 mg total) by mouth 4 (four) times daily. for 10 days  Dispense: 40 capsule; Refill: 0        Will call pt with lab results. Referral to podiatry for over grown nails.          Follow up in about 3 months (around 6/11/2025), or if symptoms worsen or fail to improve.

## 2025-03-12 ENCOUNTER — PATIENT MESSAGE (OUTPATIENT)
Dept: FAMILY MEDICINE | Facility: CLINIC | Age: OVER 89
End: 2025-03-12
Payer: MEDICARE

## 2025-03-16 DIAGNOSIS — J44.9 CHRONIC OBSTRUCTIVE PULMONARY DISEASE, UNSPECIFIED COPD TYPE: ICD-10-CM

## 2025-03-17 RX ORDER — BUDESONIDE AND FORMOTEROL FUMARATE 160; 4.5 UG/1; UG/1
AEROSOL, METERED RESPIRATORY (INHALATION)
Qty: 10.2 G | Refills: 11 | Status: SHIPPED | OUTPATIENT
Start: 2025-03-17

## 2025-03-26 ENCOUNTER — TELEPHONE (OUTPATIENT)
Dept: FAMILY MEDICINE | Facility: CLINIC | Age: OVER 89
End: 2025-03-26
Payer: MEDICARE

## 2025-03-26 NOTE — TELEPHONE ENCOUNTER
[facility] medication adherence report reviewed.  Called and spoke with patient and he states that he has blood pressure medications and they are given to him by [facility] staff.

## 2025-04-02 ENCOUNTER — PATIENT MESSAGE (OUTPATIENT)
Dept: FAMILY MEDICINE | Facility: CLINIC | Age: OVER 89
End: 2025-04-02
Payer: MEDICARE

## 2025-04-02 DIAGNOSIS — I10 HYPERTENSION, UNSPECIFIED TYPE: ICD-10-CM

## 2025-04-02 DIAGNOSIS — K59.03 DRUG-INDUCED CONSTIPATION: ICD-10-CM

## 2025-04-02 DIAGNOSIS — N40.0 BENIGN PROSTATIC HYPERPLASIA, UNSPECIFIED WHETHER LOWER URINARY TRACT SYMPTOMS PRESENT: ICD-10-CM

## 2025-04-02 DIAGNOSIS — E78.5 HYPERLIPIDEMIA, UNSPECIFIED HYPERLIPIDEMIA TYPE: ICD-10-CM

## 2025-04-02 RX ORDER — CARVEDILOL 6.25 MG/1
6.25 TABLET ORAL DAILY
Qty: 90 TABLET | Refills: 3 | Status: SHIPPED | OUTPATIENT
Start: 2025-04-02

## 2025-04-02 RX ORDER — LOSARTAN POTASSIUM 50 MG/1
50 TABLET ORAL DAILY
Qty: 90 TABLET | Refills: 3 | Status: SHIPPED | OUTPATIENT
Start: 2025-04-02

## 2025-04-02 RX ORDER — TAMSULOSIN HYDROCHLORIDE 0.4 MG/1
1 CAPSULE ORAL DAILY
Qty: 90 CAPSULE | Refills: 3 | Status: SHIPPED | OUTPATIENT
Start: 2025-04-02

## 2025-04-07 ENCOUNTER — OFFICE VISIT (OUTPATIENT)
Dept: SURGERY | Facility: CLINIC | Age: OVER 89
End: 2025-04-07
Attending: SURGERY
Payer: MEDICARE

## 2025-04-07 VITALS — BODY MASS INDEX: 21.84 KG/M2 | HEIGHT: 71 IN | WEIGHT: 156 LBS

## 2025-04-07 DIAGNOSIS — L60.0 INGROWN TOENAIL OF RIGHT FOOT: Primary | ICD-10-CM

## 2025-04-07 PROCEDURE — 87220 TISSUE EXAM FOR FUNGI: CPT | Mod: TC,59,SUR | Performed by: SURGERY

## 2025-04-07 PROCEDURE — 11750 EXCISION NAIL&NAIL MATRIX: CPT | Mod: PBBFAC | Performed by: SURGERY

## 2025-04-07 PROCEDURE — 99214 OFFICE O/P EST MOD 30 MIN: CPT | Mod: PBBFAC | Performed by: SURGERY

## 2025-04-07 PROCEDURE — 88304 TISSUE EXAM BY PATHOLOGIST: CPT | Mod: 26,,, | Performed by: PATHOLOGY

## 2025-04-07 PROCEDURE — 99999 PR PBB SHADOW E&M-EST. PATIENT-LVL IV: CPT | Mod: PBBFAC,,, | Performed by: SURGERY

## 2025-04-08 NOTE — PROCEDURES
Procedures    Preoperative diagnosis: Ingrown right 1st toenail  Postoperative diagnosis: as above  Procedure Performed: Removal of right 1st toenail    Surgeon: Dr. Spencer Johnson  Anesthesia: Local 1% lidocaine without epinephrine  Blood loss: Minimal  Complications: None  Specimen: None     Procedure in detail:  After informed consent patient's right 1st toenail was prepped and draped in the usual sterile fashion.  Injected local in and around the base of the toe to perform a toe block as well as around the nail bed.  We then elevated the toenail off the nail bed and removed completely.  We then placed a curette at the matrix at the base of the nail and placed some feet all and held pressure there for 5 minutes.  We then placed some antibiotic ointment and wrapped the toenail.  Patient tolerated the procedure well.

## 2025-04-08 NOTE — PATIENT INSTRUCTIONS
Change dressing daily on toe.   Apply non stick dressing Adaptic and wrap with kerlix bandage. Ok to use Neosporin if needed.   After nail bed is healed it can be left open to air.

## 2025-04-09 LAB
ESTROGEN SERPL-MCNC: NORMAL PG/ML
INSULIN SERPL-ACNC: NORMAL U[IU]/ML
LAB AP CLINICAL INFORMATION: NORMAL
LAB AP GROSS DESCRIPTION: NORMAL
LAB AP LABORATORY NOTES: NORMAL
T3RU NFR SERPL: NORMAL %

## 2025-06-04 ENCOUNTER — OFFICE VISIT (OUTPATIENT)
Dept: FAMILY MEDICINE | Facility: CLINIC | Age: OVER 89
End: 2025-06-04
Payer: MEDICARE

## 2025-06-04 VITALS
BODY MASS INDEX: 21.84 KG/M2 | TEMPERATURE: 98 F | DIASTOLIC BLOOD PRESSURE: 82 MMHG | HEART RATE: 68 BPM | OXYGEN SATURATION: 97 % | SYSTOLIC BLOOD PRESSURE: 137 MMHG | WEIGHT: 156 LBS | HEIGHT: 71 IN

## 2025-06-04 DIAGNOSIS — E78.5 HYPERLIPIDEMIA, UNSPECIFIED HYPERLIPIDEMIA TYPE: ICD-10-CM

## 2025-06-04 DIAGNOSIS — J84.10 PULMONARY FIBROSIS: ICD-10-CM

## 2025-06-04 DIAGNOSIS — I10 HYPERTENSION, UNSPECIFIED TYPE: Primary | ICD-10-CM

## 2025-06-04 DIAGNOSIS — E11.9 TYPE 2 DIABETES MELLITUS WITHOUT COMPLICATION, WITHOUT LONG-TERM CURRENT USE OF INSULIN: ICD-10-CM

## 2025-06-04 PROCEDURE — 1101F PT FALLS ASSESS-DOCD LE1/YR: CPT | Mod: ,,, | Performed by: NURSE PRACTITIONER

## 2025-06-04 PROCEDURE — 99213 OFFICE O/P EST LOW 20 MIN: CPT | Mod: ,,, | Performed by: NURSE PRACTITIONER

## 2025-06-04 PROCEDURE — 1159F MED LIST DOCD IN RCRD: CPT | Mod: ,,, | Performed by: NURSE PRACTITIONER

## 2025-06-04 PROCEDURE — 1126F AMNT PAIN NOTED NONE PRSNT: CPT | Mod: ,,, | Performed by: NURSE PRACTITIONER

## 2025-06-04 PROCEDURE — 3288F FALL RISK ASSESSMENT DOCD: CPT | Mod: ,,, | Performed by: NURSE PRACTITIONER

## 2025-06-04 PROCEDURE — 1160F RVW MEDS BY RX/DR IN RCRD: CPT | Mod: ,,, | Performed by: NURSE PRACTITIONER

## 2025-09-04 ENCOUNTER — OFFICE VISIT (OUTPATIENT)
Dept: FAMILY MEDICINE | Facility: CLINIC | Age: OVER 89
End: 2025-09-04
Payer: MEDICARE

## 2025-09-04 VITALS
HEIGHT: 71 IN | HEART RATE: 60 BPM | OXYGEN SATURATION: 95 % | SYSTOLIC BLOOD PRESSURE: 136 MMHG | WEIGHT: 157 LBS | DIASTOLIC BLOOD PRESSURE: 82 MMHG | TEMPERATURE: 98 F | BODY MASS INDEX: 21.98 KG/M2

## 2025-09-04 DIAGNOSIS — J84.10 PULMONARY FIBROSIS: ICD-10-CM

## 2025-09-04 DIAGNOSIS — I10 HYPERTENSION, UNSPECIFIED TYPE: Primary | ICD-10-CM

## 2025-09-04 DIAGNOSIS — E78.5 HYPERLIPIDEMIA, UNSPECIFIED HYPERLIPIDEMIA TYPE: ICD-10-CM

## 2025-09-04 DIAGNOSIS — E11.9 TYPE 2 DIABETES MELLITUS WITHOUT COMPLICATION, WITHOUT LONG-TERM CURRENT USE OF INSULIN: ICD-10-CM

## 2025-09-04 DIAGNOSIS — J44.9 CHRONIC OBSTRUCTIVE PULMONARY DISEASE, UNSPECIFIED COPD TYPE: ICD-10-CM

## 2025-09-04 LAB
ALBUMIN SERPL BCP-MCNC: 3.4 G/DL (ref 3.4–4.8)
ALBUMIN/GLOB SERPL: 1 {RATIO}
ALP SERPL-CCNC: 88 U/L (ref 40–150)
ALT SERPL W P-5'-P-CCNC: 19 U/L
ANION GAP SERPL CALCULATED.3IONS-SCNC: 11 MMOL/L (ref 7–16)
AST SERPL W P-5'-P-CCNC: 33 U/L (ref 11–45)
BASOPHILS # BLD AUTO: 0.04 K/UL (ref 0–0.2)
BASOPHILS NFR BLD AUTO: 0.7 % (ref 0–1)
BILIRUB SERPL-MCNC: 0.4 MG/DL
BUN SERPL-MCNC: 16 MG/DL (ref 8–26)
BUN/CREAT SERPL: 11 (ref 6–20)
CALCIUM SERPL-MCNC: 9.3 MG/DL (ref 8.8–10)
CHLORIDE SERPL-SCNC: 110 MMOL/L (ref 98–111)
CHOLEST SERPL-MCNC: 106 MG/DL
CHOLEST/HDLC SERPL: 2.2 {RATIO}
CO2 SERPL-SCNC: 26 MMOL/L (ref 23–31)
CREAT SERPL-MCNC: 1.44 MG/DL (ref 0.72–1.25)
DIFFERENTIAL METHOD BLD: ABNORMAL
EGFR (NO RACE VARIABLE) (RUSH/TITUS): 46 ML/MIN/1.73M2
EOSINOPHIL # BLD AUTO: 0.22 K/UL (ref 0–0.5)
EOSINOPHIL NFR BLD AUTO: 3.8 % (ref 1–4)
ERYTHROCYTE [DISTWIDTH] IN BLOOD BY AUTOMATED COUNT: 14.6 % (ref 11.5–14.5)
EST. AVERAGE GLUCOSE BLD GHB EST-MCNC: 111 MG/DL
GLOBULIN SER-MCNC: 3.3 G/DL (ref 2–4)
GLUCOSE SERPL-MCNC: 97 MG/DL (ref 75–121)
HBA1C MFR BLD HPLC: 5.5 %
HCT VFR BLD AUTO: 40.8 % (ref 40–54)
HDLC SERPL-MCNC: 49 MG/DL (ref 35–60)
HGB BLD-MCNC: 12.3 G/DL (ref 13.5–18)
IMM GRANULOCYTES # BLD AUTO: 0.01 K/UL (ref 0–0.04)
IMM GRANULOCYTES NFR BLD: 0.2 % (ref 0–0.4)
LDLC SERPL CALC-MCNC: 46 MG/DL
LYMPHOCYTES # BLD AUTO: 1.21 K/UL (ref 1–4.8)
LYMPHOCYTES NFR BLD AUTO: 20.8 % (ref 27–41)
MCH RBC QN AUTO: 30.2 PG (ref 27–31)
MCHC RBC AUTO-ENTMCNC: 30.1 G/DL (ref 32–36)
MCV RBC AUTO: 100.2 FL (ref 80–96)
MONOCYTES # BLD AUTO: 0.54 K/UL (ref 0–0.8)
MONOCYTES NFR BLD AUTO: 9.3 % (ref 2–6)
MPC BLD CALC-MCNC: 10.6 FL (ref 9.4–12.4)
NEUTROPHILS # BLD AUTO: 3.79 K/UL (ref 1.8–7.7)
NEUTROPHILS NFR BLD AUTO: 65.2 % (ref 53–65)
NONHDLC SERPL-MCNC: 57 MG/DL
NRBC # BLD AUTO: 0 X10E3/UL
NRBC, AUTO (.00): 0 %
PLATELET # BLD AUTO: 168 K/UL (ref 150–400)
POTASSIUM SERPL-SCNC: 4.9 MMOL/L (ref 3.5–5.1)
PROT SERPL-MCNC: 6.7 G/DL (ref 5.8–7.6)
RBC # BLD AUTO: 4.07 M/UL (ref 4.6–6.2)
SODIUM SERPL-SCNC: 142 MMOL/L (ref 136–145)
TRIGL SERPL-MCNC: 54 MG/DL (ref 34–140)
TSH SERPL DL<=0.005 MIU/L-ACNC: 2.1 UIU/ML (ref 0.35–4.94)
VLDLC SERPL-MCNC: 11 MG/DL
WBC # BLD AUTO: 5.81 K/UL (ref 4.5–11)

## 2025-09-04 PROCEDURE — 1126F AMNT PAIN NOTED NONE PRSNT: CPT | Mod: ,,, | Performed by: NURSE PRACTITIONER

## 2025-09-04 PROCEDURE — 80053 COMPREHEN METABOLIC PANEL: CPT | Mod: ,,, | Performed by: CLINICAL MEDICAL LABORATORY

## 2025-09-04 PROCEDURE — 1160F RVW MEDS BY RX/DR IN RCRD: CPT | Mod: ,,, | Performed by: NURSE PRACTITIONER

## 2025-09-04 PROCEDURE — 85025 COMPLETE CBC W/AUTO DIFF WBC: CPT | Mod: ,,, | Performed by: CLINICAL MEDICAL LABORATORY

## 2025-09-04 PROCEDURE — 1101F PT FALLS ASSESS-DOCD LE1/YR: CPT | Mod: ,,, | Performed by: NURSE PRACTITIONER

## 2025-09-04 PROCEDURE — 3288F FALL RISK ASSESSMENT DOCD: CPT | Mod: ,,, | Performed by: NURSE PRACTITIONER

## 2025-09-04 PROCEDURE — 80061 LIPID PANEL: CPT | Mod: ,,, | Performed by: CLINICAL MEDICAL LABORATORY

## 2025-09-04 PROCEDURE — 99213 OFFICE O/P EST LOW 20 MIN: CPT | Mod: ,,, | Performed by: NURSE PRACTITIONER

## 2025-09-04 PROCEDURE — 84443 ASSAY THYROID STIM HORMONE: CPT | Mod: ,,, | Performed by: CLINICAL MEDICAL LABORATORY

## 2025-09-04 PROCEDURE — 83036 HEMOGLOBIN GLYCOSYLATED A1C: CPT | Mod: ,,, | Performed by: CLINICAL MEDICAL LABORATORY

## 2025-09-04 PROCEDURE — 1159F MED LIST DOCD IN RCRD: CPT | Mod: ,,, | Performed by: NURSE PRACTITIONER

## 2025-09-05 ENCOUNTER — PATIENT MESSAGE (OUTPATIENT)
Dept: FAMILY MEDICINE | Facility: CLINIC | Age: OVER 89
End: 2025-09-05
Payer: MEDICARE